# Patient Record
Sex: FEMALE | Race: BLACK OR AFRICAN AMERICAN | NOT HISPANIC OR LATINO | Employment: FULL TIME | URBAN - METROPOLITAN AREA
[De-identification: names, ages, dates, MRNs, and addresses within clinical notes are randomized per-mention and may not be internally consistent; named-entity substitution may affect disease eponyms.]

---

## 2020-04-06 ENCOUNTER — CLINICAL SUPPORT (OUTPATIENT)
Dept: NUTRITION | Facility: CLINIC | Age: 55
End: 2020-04-06
Payer: COMMERCIAL

## 2020-04-06 DIAGNOSIS — Z71.3 DIETARY COUNSELING AND SURVEILLANCE: Primary | ICD-10-CM

## 2020-04-06 PROCEDURE — 97802 MEDICAL NUTRITION INDIV IN: CPT | Mod: 95,,, | Performed by: NUTRITIONIST

## 2020-04-06 PROCEDURE — 97802 PR MED NUTR THER, 1ST, INDIV, EA 15 MIN: ICD-10-PCS | Mod: 95,,, | Performed by: NUTRITIONIST

## 2020-04-06 NOTE — PROGRESS NOTES
"Nutrition Assessment for Initial Medical Nutrition Therapy    Referring professional: self referral for employee wellness    Reason for MNT visit: Pt in for education and nutrition counseling regarding dietary counseling for goal of body fat loss and healthy living  The patient location is: [place], [place]  The chief complaint leading to consultation is: goal of losing weight  Visit type: Virtual visit with synchronous audio and video  Total time spent with patient: 1 hour  Each patient to whom he or she provides medical services by telemedicine is:  (1) informed of the relationship between the physician and patient and the respective role of any other health care provider with respect to management of the patient; and (2) notified that he or she may decline to receive medical services by telemedicine and may withdraw from such care at any time.    Notes: see below under clinical signs and symptoms    ASSESSMENT    Age: 54 y.o.  Wt: 225 lb  Wt Readings from Last 1 Encounters:   No data found for Wt     Ht: 5'7"  Ht Readings from Last 1 Encounters:   No data found for Ht     BMI: 35.2  BMI Readings from Last 1 Encounters:   No data found for BMI       Clinical signs/symptoms: Patient reported slightly low Energy    Medical History: High blood pressure      Medications: Benicat/HTCZ    Supplements: none    Food/medication interactions:   ANTIHYPERTENSIVE: Insure adequate fluid intake/hydration. Decrease sodium, decrease calories may be recommended. Avoid salt subs. Caution with K suppl. Caution with Mg suppl. Caution with IV iron-severe systemic reactions. Avoid natural licorice.    Food allergies  intolerances: NKFA    Labs:  No labs provided to review and none on file   No results found for: HGBA1C  No results found for: CHOL, TRIG, HDL, LDLCALC    Typical day recall: reviewed and noted during telemedicine consult    Beverages: water: 1 gallon per day; coffee: 1 cup most days of the week with a couple " Tablespoons creamer    Dining out: Regular, 2-3 times per week    Alcohol: nonsmoker; patient stated she drinks a glass of wine every now and then     Lifestyle Influences  Support System: Self    Meal preparation/shopping: self    Current Activity Level: currently none. She was going to the gym a few days a week before COVID 19. We have a goal of walking at least 15-20 minutes per day and to gradually increase time of walking    Patient motivation, anticipated barriers, expected compliance: Patient is motivated and has verbalized understanding and intent to comply    DIAGNOSIS   Problem: Obese  Etiology: related to eating too many calories  Signs/Symptoms: AEB BMI of 35.2 and 24 hour recall    INTERVENTION  Nutrition prescription: estimated energy requirements:   1900 calories   grams protein  120 grams carbohydrates  Focus on heart healthy fats  Fluid: 110 oz + sweat loss      Recommendations & Goals:  Patient goals and recommendations are tailored to the specific patient's needs, readiness to change, lifestyle, culture, skills, resources, & abilities. Strategies to help achieve these nutrition-related goals were discussed which can include but are not limited to SMART goal setting & mindful eating.     Aim for a minimum of 7 hours sleep   Exercise 60 minutes most days  Eat breakfast within 1-2 hours of waking up  Try not to skip any meals or snacks, not going more than 3-4 hours without eating.   At each meal and snack, try to include a source of fiber + lean protein + healthy fat.       Written Materials Provided  These resources are intended to assist the patient in making it easier to choose recommended options when eating out & to identify better-for-you brands at the grocery store:     Meal Planning Guide with recommendations discussed along with portion sizes and a customized meal plan    Eat Fit cleve card as a reminder to download the cleve to ones smart phone which provides: current Eat Fit  partners, approved menu options, food labels for carb counting, & recipes    Fast Food Lite Guide   Eat Fit Grocery Product list    RD contact information- for patient to contact regarding any questions, needs, and/or concerns that may arise     MONITORING & EVALUATION    Communicated with healthcare provider    Documented plan for referral to appropriate agency/healthcare provider as needed    Comprehension: fair     Motivation to change: moderate    Follow-up: 1 year or prn    Counseling time: 1 Hour

## 2020-04-16 ENCOUNTER — OFFICE VISIT (OUTPATIENT)
Dept: FAMILY MEDICINE | Facility: CLINIC | Age: 55
End: 2020-04-16
Payer: COMMERCIAL

## 2020-04-16 VITALS — DIASTOLIC BLOOD PRESSURE: 78 MMHG | SYSTOLIC BLOOD PRESSURE: 126 MMHG

## 2020-04-16 DIAGNOSIS — Z11.59 SCREENING FOR VIRAL DISEASE: ICD-10-CM

## 2020-04-16 DIAGNOSIS — E55.9 VITAMIN D DEFICIENCY: ICD-10-CM

## 2020-04-16 DIAGNOSIS — Z11.4 SCREENING FOR HIV (HUMAN IMMUNODEFICIENCY VIRUS): ICD-10-CM

## 2020-04-16 DIAGNOSIS — I10 ESSENTIAL HYPERTENSION: Primary | ICD-10-CM

## 2020-04-16 DIAGNOSIS — N93.9 VAGINAL BLEEDING, ABNORMAL: ICD-10-CM

## 2020-04-16 DIAGNOSIS — Z12.31 SCREENING MAMMOGRAM, ENCOUNTER FOR: ICD-10-CM

## 2020-04-16 PROCEDURE — 3074F PR MOST RECENT SYSTOLIC BLOOD PRESSURE < 130 MM HG: ICD-10-PCS | Mod: CPTII,,, | Performed by: INTERNAL MEDICINE

## 2020-04-16 PROCEDURE — 3074F SYST BP LT 130 MM HG: CPT | Mod: CPTII,,, | Performed by: INTERNAL MEDICINE

## 2020-04-16 PROCEDURE — 99204 PR OFFICE/OUTPT VISIT, NEW, LEVL IV, 45-59 MIN: ICD-10-PCS | Mod: 95,,, | Performed by: INTERNAL MEDICINE

## 2020-04-16 PROCEDURE — 99204 OFFICE O/P NEW MOD 45 MIN: CPT | Mod: 95,,, | Performed by: INTERNAL MEDICINE

## 2020-04-16 PROCEDURE — 3078F DIAST BP <80 MM HG: CPT | Mod: CPTII,,, | Performed by: INTERNAL MEDICINE

## 2020-04-16 PROCEDURE — 3078F PR MOST RECENT DIASTOLIC BLOOD PRESSURE < 80 MM HG: ICD-10-PCS | Mod: CPTII,,, | Performed by: INTERNAL MEDICINE

## 2020-04-16 RX ORDER — OLMESARTAN MEDOXOMIL AND HYDROCHLOROTHIAZIDE 40/25 40; 25 MG/1; MG/1
1 TABLET ORAL DAILY
Qty: 90 TABLET | Refills: 3 | Status: SHIPPED | OUTPATIENT
Start: 2020-04-16 | End: 2021-07-13 | Stop reason: SDUPTHER

## 2020-04-16 RX ORDER — OLMESARTAN MEDOXOMIL AND HYDROCHLOROTHIAZIDE 40/25 40; 25 MG/1; MG/1
1 TABLET ORAL DAILY
COMMUNITY
Start: 2020-02-10 | End: 2020-04-16 | Stop reason: SDUPTHER

## 2020-04-16 NOTE — ASSESSMENT & PLAN NOTE
Has had 2 episodes of VB after sex.  Last GYN visit was 2 years ago.  Had right salpingoophorectomy

## 2020-04-16 NOTE — PROGRESS NOTES
The patient location is:home  The chief complaint leading to consultation is: establish care  Visit type: audiovisual  Total time spent with patient: 15 minutes  Each patient to whom he or she provides medical services by telemedicine is:  (1) informed of the relationship between the physician and patient and the respective role of any other health care provider with respect to management of the patient; and (2) notified that he or she may decline to receive medical services by telemedicine and may withdraw from such care at any time.    [facility] Note    Chief Complaint      Chief Complaint   Patient presents with    Hypertension     History of Present Illness      Alejandrina Garrison is a 54 y.o. female who presents today to establish care for HTN.  Patient comes to appointment via virtual visit.     Problem List Items Addressed This Visit     Essential hypertension - Primary    Current Assessment & Plan     BP controlled on Olmesartan/HCTZ 40/25 mg daily, no SOB/CP.         Relevant Orders    CBC auto differential    Lipid panel    Comprehensive metabolic panel    Vaginal bleeding, abnormal    Current Assessment & Plan     Has had 2 episodes of VB after sex.  Last GYN visit was 2 years ago.  Had right salpingoophorectomy         Relevant Orders    Ambulatory referral/consult to Obstetrics / Gynecology      Other Visit Diagnoses     Vitamin D deficiency        Relevant Orders    Vitamin D    Screening for HIV (human immunodeficiency virus)        Relevant Orders    HIV 1/2 Ag/Ab (4th Gen)    Screening mammogram, encounter for        Relevant Orders    Mammo Digital Screening Bilat w/ Omar    Screening for viral disease        Relevant Orders    Hepatitis C Antibody          Health Maintenance   Topic Date Due    Hepatitis C Screening  1965    Lipid Panel  1965    TETANUS VACCINE  12/19/1983    Pap Smear with HPV Cotest  12/19/1986    Mammogram  12/19/2005     Colonoscopy  2015       Past Medical History:   Diagnosis Date    Hypertension        Past Surgical History:   Procedure Laterality Date     SECTION  1988    x3, 1998, 1991, 2008    DILATION AND CURETTAGE OF UTERUS      2018    SALPINGOOPHORECTOMY  2008    right       family history includes Cancer in her father; Hypertension in her mother and sister; Leukemia in her father; Osteoporosis in her mother.    Social History     Tobacco Use    Smoking status: Never Smoker   Substance Use Topics    Alcohol use: Yes     Alcohol/week: 1.0 standard drinks     Types: 1 Glasses of wine per week    Drug use: Never       Review of Systems   Constitutional: Negative for chills and fever.   HENT: Negative for congestion and sore throat.    Eyes: Negative for blurred vision and discharge.   Respiratory: Negative for cough and shortness of breath.    Cardiovascular: Negative for chest pain and palpitations.   Gastrointestinal: Negative for constipation, diarrhea, nausea and vomiting.   Genitourinary: Negative for dysuria and hematuria.   Musculoskeletal: Negative for falls and myalgias.   Skin: Negative for itching and rash.   Neurological: Negative for dizziness and headaches.        Outpatient Encounter Medications as of 2020   Medication Sig Dispense Refill    olmesartan-hydrochlorothiazide (BENICAR HCT) 40-25 mg per tablet Take 1 tablet by mouth once daily. 90 tablet 3    [DISCONTINUED] olmesartan-hydrochlorothiazide (BENICAR HCT) 40-25 mg per tablet Take 1 tablet by mouth once daily.       No facility-administered encounter medications on file as of 2020.         Review of patient's allergies indicates:  No Known Allergies    Physical Exam      Vital Signs  BP: 126/78] No vital signs taken by me as this is a virtual visit.  Any reported are patient's home measurements.      Physical Exam   Constitutional: She is oriented to person, place, and time. She appears well-developed and well-nourished.  No distress.   HENT:   Head: Normocephalic and atraumatic.   Neck: Normal range of motion.   Pulmonary/Chest: Effort normal. No respiratory distress.   Neurological: She is alert and oriented to person, place, and time. Coordination normal.   Skin: No rash noted.   Psychiatric: She has a normal mood and affect. Her behavior is normal. Judgment and thought content normal.        Laboratory:  CBC:  No results for input(s): WBC, RBC, HGB, HCT, PLT, MCV, MCH, MCHC in the last 2160 hours.  CMP:  No results for input(s): GLU, CALCIUM, ALBUMIN, PROT, NA, K, CO2, CL, BUN, ALKPHOS, ALT, AST, BILITOT in the last 2160 hours.    Invalid input(s): CREATININ  URINALYSIS:  No results for input(s): COLORU, CLARITYU, SPECGRAV, PHUR, PROTEINUA, GLUCOSEU, BILIRUBINCON, BLOODU, WBCU, RBCU, BACTERIA, MUCUS, NITRITE, LEUKOCYTESUR, UROBILINOGEN, HYALINECASTS in the last 2160 hours.   LIPIDS:  No results for input(s): TSH, HDL, CHOL, TRIG, LDLCALC, CHOLHDL, NONHDLCHOL, TOTALCHOLEST in the last 2160 hours.  TSH:  No results for input(s): TSH in the last 2160 hours.  A1C:  No results for input(s): HGBA1C in the last 2160 hours.    Radiology:  No imaging on file    Assessment/Plan     Alejandrina Garrison is a 54 y.o.female with:    1. Essential hypertension  - CBC auto differential; Future  - Lipid panel; Future  - Comprehensive metabolic panel; Future    2. Vaginal bleeding, abnormal  - Ambulatory referral/consult to Obstetrics / Gynecology; Future    3. Vitamin D deficiency  - Vitamin D; Future    4. Screening for HIV (human immunodeficiency virus)  - HIV 1/2 Ag/Ab (4th Gen); Future    5. Screening mammogram, encounter for  - Mammo Digital Screening Bilat w/ Omar; Future    6. Screening for viral disease  - Hepatitis C Antibody; Future    -Labs sent downstairs, will order MMG  -Patient will bring me Dr. Garcia's records ([address] [place] [address], [address], [facility] [address], [phone])  -Refer for screening colonoscopy once COVID has  resolved (Marv)  -Continue current medications and maintain follow up with specialists.  Return to clinic in 6 months.      [facility] [place]

## 2020-04-20 RX ORDER — ERGOCALCIFEROL 1.25 MG/1
50000 CAPSULE ORAL
Qty: 12 CAPSULE | Refills: 0 | Status: SHIPPED | OUTPATIENT
Start: 2020-04-20 | End: 2021-07-22

## 2020-04-21 DIAGNOSIS — Z01.84 ANTIBODY RESPONSE EXAMINATION: ICD-10-CM

## 2020-04-30 ENCOUNTER — PATIENT MESSAGE (OUTPATIENT)
Dept: NUTRITION | Facility: CLINIC | Age: 55
End: 2020-04-30

## 2020-05-12 ENCOUNTER — TELEPHONE (OUTPATIENT)
Dept: OBSTETRICS AND GYNECOLOGY | Facility: CLINIC | Age: 55
End: 2020-05-12

## 2020-05-12 NOTE — TELEPHONE ENCOUNTER
Received IM that pt went to the [facility] location for appt today at 1pm. Pt states she prefers [facility] and thought that's where the appt was. Pt rescheduled for 5/14 at 8:30am. Patient verbalized understanding.

## 2020-05-13 ENCOUNTER — TELEPHONE (OUTPATIENT)
Dept: OBSTETRICS AND GYNECOLOGY | Facility: CLINIC | Age: 55
End: 2020-05-13

## 2020-05-13 DIAGNOSIS — N95.0 POSTMENOPAUSAL BLEEDING: Primary | ICD-10-CM

## 2020-05-13 NOTE — TELEPHONE ENCOUNTER
----- Message from Bebe Saldana sent at 5/13/2020  9:52 AM CDT -----  Contact: [phone]/ self   Patient requesting to speak with you regarding an ultrasound. She says she thought she was scheduled for one on today but isn't. Please advise.

## 2020-05-13 NOTE — TELEPHONE ENCOUNTER
----- Message from Nina Santiago sent at 5/13/2020  8:48 AM CDT -----  Contact: Alejandrina Amaya said she is supposed to have an ultrasound done today, but she doesn't have any orders in yet.

## 2020-05-14 ENCOUNTER — OFFICE VISIT (OUTPATIENT)
Dept: OBSTETRICS AND GYNECOLOGY | Facility: CLINIC | Age: 55
End: 2020-05-14
Payer: COMMERCIAL

## 2020-05-14 VITALS
SYSTOLIC BLOOD PRESSURE: 116 MMHG | DIASTOLIC BLOOD PRESSURE: 82 MMHG | BODY MASS INDEX: 37.89 KG/M2 | WEIGHT: 241.38 LBS | HEIGHT: 67 IN

## 2020-05-14 DIAGNOSIS — N92.6 IRREGULAR BLEEDING: Primary | ICD-10-CM

## 2020-05-14 PROCEDURE — 88305 TISSUE EXAM BY PATHOLOGIST: CPT | Performed by: PATHOLOGY

## 2020-05-14 PROCEDURE — 99499 UNLISTED E&M SERVICE: CPT | Mod: S$GLB,,, | Performed by: OBSTETRICS & GYNECOLOGY

## 2020-05-14 PROCEDURE — 88305 TISSUE EXAM BY PATHOLOGIST: CPT | Mod: 26,,, | Performed by: PATHOLOGY

## 2020-05-14 PROCEDURE — 58100 PR BIOPSY OF UTERUS LINING: ICD-10-PCS | Mod: 59,S$GLB,, | Performed by: OBSTETRICS & GYNECOLOGY

## 2020-05-14 PROCEDURE — 99499 NO LOS: ICD-10-PCS | Mod: S$GLB,,, | Performed by: OBSTETRICS & GYNECOLOGY

## 2020-05-14 PROCEDURE — 99999 PR PBB SHADOW E&M-EST. PATIENT-LVL III: CPT | Mod: PBBFAC,,, | Performed by: OBSTETRICS & GYNECOLOGY

## 2020-05-14 PROCEDURE — 58555 PR HYSTEROSCOPY,DX,SEP PROC: ICD-10-PCS | Mod: S$GLB,,, | Performed by: OBSTETRICS & GYNECOLOGY

## 2020-05-14 PROCEDURE — 88305 TISSUE EXAM BY PATHOLOGIST: ICD-10-PCS | Mod: 26,,, | Performed by: PATHOLOGY

## 2020-05-14 PROCEDURE — 58555 HYSTEROSCOPY DX SEP PROC: CPT | Mod: S$GLB,,, | Performed by: OBSTETRICS & GYNECOLOGY

## 2020-05-14 PROCEDURE — 99999 PR PBB SHADOW E&M-EST. PATIENT-LVL III: ICD-10-PCS | Mod: PBBFAC,,, | Performed by: OBSTETRICS & GYNECOLOGY

## 2020-05-14 PROCEDURE — 58100 BIOPSY OF UTERUS LINING: CPT | Mod: 59,S$GLB,, | Performed by: OBSTETRICS & GYNECOLOGY

## 2020-05-14 NOTE — PROGRESS NOTES
GYNECOLOGY OFFICE NOTE    Reason for visit: irregular bleeding    HPI: Pt is a 54 y.o.  female  who presents for evaluation of irregular cycles. Cycle: menarche- 13, Interval- Q month up until . States for last 5 years has had 1 cycle a year in Dec. Duration can vary from 3-5 days to 10-12 days, Flow- heavy (changing every 2-3 hrs) this time used pad and tampon, denies dysmenorrhea. She is sexually active. She does not desire STI screening. She denies vaginal discharge.  Last pap: 2018, denies hx of abnormal. Can get records from previous gyn. Had work up that included EMBX and D&C hysteroscopy in 2018. We discussed EMBX, in office hysteroscopy + embx, or D&C hysteroscopy. After discussing risk, benefits of each decision made to proceed with hysteroscopy with embx today. Consents signed.     U/S: 20:  The uterus measures 8.0 cm in length.   The endometrial stripe in this premenopausal patient measures 4.3 mm which is within normal limits.  The right ovary measures 1.9 x 1.2 x 1.6 cm.  9 mm follicle noted within the right ovary.  The left ovary is apparently surgically absent.    Arterial flow demonstrated to the right ovary.  No free fluid identified.    Past Medical History:   Diagnosis Date    Hypertension        Past Surgical History:   Procedure Laterality Date     SECTION  1988    x3, , ,     DILATION AND CURETTAGE OF UTERUS      2018    right tubal       SALPINGOOPHORECTOMY  2008    left       Family History   Problem Relation Age of Onset    Hypertension Mother     Osteoporosis Mother     Cancer Father     Leukemia Father     Hypertension Sister     Colon cancer Maternal Grandmother     Breast cancer Neg Hx     Ovarian cancer Neg Hx        Social History     Tobacco Use    Smoking status: Never Smoker    Smokeless tobacco: Never Used   Substance Use Topics    Alcohol use: Yes     Alcohol/week: 1.0 standard drinks     Types: 1 Glasses of wine per week     "Drug use: Never       OB History    Para Term  AB Living   4 4       4   SAB TAB Ectopic Multiple Live Births           4      # Outcome Date GA Lbr Kwasi/2nd Weight Sex Delivery Anes PTL Lv   4 Para      CS-Unspec   FLASH   3 Para         FLASH   2 Para      CS-Unspec   FLASH   1 Para      CS-Unspec   FLASH       Current Outpatient Medications   Medication Sig    ergocalciferol (ERGOCALCIFEROL) 50,000 unit Cap Take 1 capsule (50,000 Units total) by mouth every 7 days.    olmesartan-hydrochlorothiazide (BENICAR HCT) 40-25 mg per tablet Take 1 tablet by mouth once daily.     No current facility-administered medications for this visit.        Allergies: Morphine and Compazine [prochlorperazine edisylate]     /82   Ht 5' 7" (1.702 m)   Wt 109.5 kg (241 lb 6.5 oz)   LMP  (LMP Unknown)   BMI 37.81 kg/m²     ROS:  GENERAL: Denies fever or chills.   SKIN: Denies rash or lesions.   HEAD: Denies head injury or headache.   CHEST: Denies chest pain or shortness of breath.   CARDIOVASCULAR: Denies palpitations or chest pain.   ABDOMEN: No constipation, diarrhea, nausea, vomiting or rectal bleeding.   URINARY: No dysuria, hematuria, or burning on urination.  REPRODUCTIVE: See HPI.   BREASTS: see HPI  NEUROLOGIC: Denies syncope or weakness.     Physical Exam:  GENERAL: alert, appears stated age and cooperative  NEUROLOGIC: orientated to person, place and time, normal mood and affect   CHEST: Normal respiratory effort  NECK: normal appearance  SKIN: no acne, hirsutism  BREAST EXAM: not examined  ABDOMEN: abdomen is soft without significant tenderness, masses  EXTERNAL GENITALIA:  normal general appearance  URETHRA: normal urethra, normal urethral meatus  VAGINA:  normal without tenderness, induration or masses  CERVIX:  Normal    PROCEDURE:  OPERATIVE REPORT FOR OFFICE HYSTEROSCOPY WITH ENDOMETRIAL BIOPSY                                                                                                             "                  Date of Procedure: 5/14/20                                                                               SURGEON:  DILIA Joseph M.D.                                                                                                                    ASSISTANT:  None                                                                                                                      PREOPERATIVE DIAGNOSIS:  1. Abnormal uterine bleeding                                                                                        POSTOPERATIVE DIAGNOSIS:    1. Abnormal uterine bleeding                                                                                 PROCEDURE:  Diagnostic Hysteroscopy with Kristina Operascope, Endometrial Biopsy                                                                               ANESTHESIA:  None     BLOOD LOSS:  None                                                                                                                                     URINE OUTPUT:  prior to procedure.                                                                                       IV FLUIDS:  None     HYSTEROSCOPIC FLUID IN: 100mL     HYSTEROSCOPIC FLUID OUT: 100mL     HYSTEROSCOPIC FLUID DEFICIT:  0mL     COMPLICATIONS:  None     SPECIMENS:  1) Endometrial biopsy      FINDINGS:  1) Normal tubal ostia bilaterally  2. normal uterine cavity  3. normal cervical canal     STATEMENT OF MEDICAL NECESSITY:  Pt is a 54 year old female, with recurrent postmenopausal bleeding.  A transvaginal ultrasound was performed prior which showed normal uterine cavity.   Risks/benefits/alternatives/indications were discussed with the patient and she wished to proceed with the above stated procedure. Written consents obtained.                                                                                                                                 PROCEDURE IN DETAIL:  After appropriate consents had been  obtained and time out performed, the patient was taken to the procedure room. Patient was then placed in the lithotomy position, prepped in the appropriate sterile fashion for a vaginal procedure.  A speculum was put in place.  Betadine used to cleanse the cervix. A single tooth tenaculum was placed on the anterior lip of the cervix.  The uterine cavity sounded to approximately 9 cm.  No need for cervical dilation. The Kristina operoscope was inserted into the endometrial cavity under direct visualization. without difficulty. Both tubal ostia were clearly identified and the findings were noted above. The kristina device was removed and a endometrial biopsy was performed and tissue sent to Pathology. The tenaculum was removed and hemostasis noted.The speculum was then removed. All counts were correct x 2 at the end of the procedure.  The patient was out of lithotomy position. Antibiotics were not indicated for this case.       Diagnosis:  1. Irregular bleeding        Plan:   1. Normal ultrasound and now s/p in office hysteroscopy with embx in office. F/u pending results. SRAVANTHI to review records from previous work-up       Orders Placed This Encounter    Specimen to Pathology, Ob/Gyn       Patient was counseled today on the new ACS guidelines for cervical cytology screening as well as the current recommendations for breast cancer screening. She was counseled to follow up with her PCP for other routine health maintenance.     Follow up for pending results.      Cinthia Joseph MD  OB/GYN

## 2020-05-14 NOTE — PATIENT INSTRUCTIONS
Endometrial Biopsy    Endometrial biopsy is a procedure used to study the endometrium (lining of the uterus). It is usually done in your health care providers office. During the biopsy, small tissue samples are taken from inside the uterus. These are then sent to a lab for study. If any problems are found, you and your health care provider will discuss treatment options. The biopsy usually takes only a few minutes, and you can often go back to your normal routine as soon as the procedure is over.  Reasons for the procedure  Endometrial biopsy may help pinpoint the cause of certain problems. These include:  · Bleeding after menopause  · Heavy or irregular menstrual periods  · Bleeding associated with hormone therapy  · Prolonged bleeding  · Abnormal Pap test results  · Having certain types of cancer  · Trouble getting pregnant (fertility problems)  What are the risks?  Problems with endometrial biopsy are rare, but can include:  · Bleeding  · Infection  · Damage to the uterine wall (very rare)  Getting ready for the procedure  Your health care provider will ask about your health and any medicines you take, like blood thinners. Before your biopsy, you may have tests to make sure youre not pregnant or have an infection. You may also be asked to sign a consent form. A day or 2 before the procedure:   · Avoid using creams or other vaginal medicines.  · Avoid douching.  · Ask your health care provider if you should take pain medicines shortly before the test.  During the biopsy  During the biopsy, you will likely experience the following:  · You will be asked to lie on an exam table with your knees bent, just as you do for a Pap test.  · You may have a brief pelvic exam. An instrument called a speculum is then inserted into the vagina to hold it open.  · An antiseptic solution may be applied to the cervix. The cervix may also be numbed with an anesthetic or dilated to widen the opening.  · A small tube is passed  through the cervix into the uterus.  · It is normal to feel some cramping when the tube is inserted. But tell your health care provider if you have severe cramping or are very uncomfortable.  · Using mild suction, samples are taken from the uterine lining. You may feel pinching or additional cramping when this is done.  · The tube and speculum are then removed and the samples are sent to a lab for study.  After the procedure  After the procedure, you may experience the following:  · If you feel lightheaded or dizzy, you can rest on the table until youre ready to get dressed.  · For a few hours, you may feel some mild cramping. This can usually be relieved with over-the-counter pain medicines.  · You may have some bleeding for a few days. Use pads instead of tampons.  · Dont douche or use any vaginal medicines unless your health care provider says its OK.  · Ask your health care provider when its OK to have sex again.  Follow-up care  It will take about a week for the biopsy results to come back from the lab. Then you and your health care provider can discuss the results. These may show that no treatment is required. Or, you may be scheduled for a follow-up appointment and more tests. If your biopsy was done for fertility problems, be sure to record the day when your next period begins.     Call your health care provider   Contact your health care provider if you have any of the following:  · Heavy bleeding (more than a pad an hour for 2 hours).  · Severe cramping or increasing pain.  · Fever over 100.4°F (38.0°C)  · Foul-smelling or unusual vaginal discharge.   Date Last Reviewed: 5/10/2015  © 8307-0636 [employer]. [address] [place] [address], [place] [address]. All rights reserved. This information is not intended as a substitute for professional medical care. Always follow your healthcare professional's instructions.

## 2020-05-14 NOTE — LETTER
May 14, 2020      Chapis Martinez MD  [id number] [place] [address]  [address]  [place] [address]           [place] [facility]  [address] [place] [address], [facility] [address]  [facility] [address]  Phone: [phone]          Patient: Alejandrina Garrison   MR Number: [id number]   YOB: 1965   Date of Visit: 5/14/2020       Dear Dr. Chapis Martinze:    Thank you for referring Alejandrina Garrison to me for evaluation. Attached you will find relevant portions of my assessment and plan of care.    If you have questions, please do not hesitate to call me. I look forward to following Alejandrina Garrison along with you.    Sincerely,    Cinthia Joseph MD    Enclosure  CC:  No Recipients    If you would like to receive this communication electronically, please contact [email or web address] or [phone] to request more information on [employer] access.    For providers and/or their staff who would like to refer a patient to [facility], please contact us through our one-stop-shop provider referral line, [facility], at [phone].    If you feel you have received this communication in error or would no longer like to receive these types of communications, please e-mail [email or web address]

## 2020-05-15 ENCOUNTER — PATIENT MESSAGE (OUTPATIENT)
Dept: FAMILY MEDICINE | Facility: CLINIC | Age: 55
End: 2020-05-15

## 2020-05-15 ENCOUNTER — PATIENT MESSAGE (OUTPATIENT)
Dept: ADMINISTRATIVE | Facility: OTHER | Age: 55
End: 2020-05-15

## 2020-05-18 LAB
FINAL PATHOLOGIC DIAGNOSIS: NORMAL
GROSS: NORMAL

## 2020-05-21 DIAGNOSIS — Z01.84 ANTIBODY RESPONSE EXAMINATION: ICD-10-CM

## 2020-06-17 ENCOUNTER — PATIENT MESSAGE (OUTPATIENT)
Dept: FAMILY MEDICINE | Facility: CLINIC | Age: 55
End: 2020-06-17

## 2020-06-20 DIAGNOSIS — Z01.84 ANTIBODY RESPONSE EXAMINATION: ICD-10-CM

## 2020-07-20 DIAGNOSIS — Z01.84 ANTIBODY RESPONSE EXAMINATION: ICD-10-CM

## 2020-08-19 DIAGNOSIS — Z01.84 ANTIBODY RESPONSE EXAMINATION: ICD-10-CM

## 2020-09-18 DIAGNOSIS — Z01.84 ANTIBODY RESPONSE EXAMINATION: ICD-10-CM

## 2020-10-05 ENCOUNTER — PATIENT MESSAGE (OUTPATIENT)
Dept: INTERNAL MEDICINE | Facility: CLINIC | Age: 55
End: 2020-10-05

## 2020-10-18 DIAGNOSIS — Z01.84 ANTIBODY RESPONSE EXAMINATION: ICD-10-CM

## 2020-11-03 ENCOUNTER — PATIENT OUTREACH (OUTPATIENT)
Dept: OTHER | Facility: OTHER | Age: 55
End: 2020-11-03

## 2020-11-03 NOTE — LETTER
November 6, 2020     Alejandrina Garrison  [address] [facility] [address]       Dear Alejandrina,    Welcome to [facility]! Our goal is to make care effective, proactive and convenient by using data you send us from home to better treat your chronic conditions.                My name is Rita Mike Rosales, and I am your dedicated Digital Medicine clinician. As an expert in medication management, I will help ensure that the medications you are taking continue to provide the intended benefits and help you reach your goals. You can reach me directly at [phone] or by sending me a message directly through your [facility] account.      I am Stefany Olson and I will be your health . My job is to help you identify lifestyle changes to improve your disease control. We will talk about nutrition, exercise, and other ways you may be able to adjust your current habits to better your health. Additionally, we will help ensure you are completing the tests and screenings that are necessary to help manage your conditions. You can reach me directly at [phone] or by sending me a message directly through your [facility] account.    Most importantly, YOU are at the center of this team. Together, we will work to improve your overall health and encourage you to meet your goals for a healthier lifestyle.     What we expect from YOU:  · Please take frequent home blood pressure measurements. We ask that you take at least 1 blood pressure reading per week, but more information will better help us get you know you. Be sure you rest for a few minutes before taking the reading in a quiet, comfortable place.     Be available to receive phone calls or [employer] messages, when appropriate, from your care team. Please let us know if there are any specific days or times that work best for us to reach you via phone.     Complete routine tests and screenings. Dont worry, we will help keep you on track!           What you  should expect from your Digital Medicine Care Team:   We will work with you to create a personalized plan of care and provide you with encouragement and education, including regarding lifestyle changes, that could help you manage your disease states.     We will adjust your current medications, if needed, and continue to monitor your long-term progress.     We will provide you and your physician with monthly progress reports after you have been in the program for more than 30 days.     We will send you reminders through [employer] and text messages to help ensure you do not miss any testing deadlines to help manage your disease states.    You will be able to reach us by phone or through your [employer] account by clicking our names under Care Team on the right side of the home screen.    We look forward to working with you to help manage your health,    Sincerely,    Your Digital Medicine Team    Please visit our websites to learn more:   · Hypertension: [email or web address]      Remember, we are not available for emergencies. If you have an emergency, please contact your doctors office directly or call [facility] ([phone] or [phone]) or 911.

## 2020-11-06 NOTE — PROGRESS NOTES
Digital Medicine: Health  Introduction    Introduced Alejandrina Garrison to Digital Medicine. Discussed health  role and recommended lifestyle modifications.    The history is provided by the patient.               HYPERTENSION  Explained hypertension digital medicine goals including BP goal less than or equal to 130/80mmHg, improved convenience of BP management and reduced risk of heart attack, kidney failure, stroke, eye disease, dementia, and death.      Explained non-pharmacologic therapies like low salt diet and physical activity can reduce blood pressure. .      Explained that we expect patient to submit several blood pressure readings per week at random times of the day, but at least 30 minutes after taking blood pressure medications. Instructed patient not to allow anyone else to use their blood pressure monitor and phone as data submitted is directly entered into medical record. Reviewed and confirmed appropriate blood pressure monitoring technique.         Patient's BP goal is less than or equal to 130/80.Patient's BP average is 142/84 mmHg, which is above goal, per 2017 ACC/AHA Hypertension Guidelines.    Explained to the patient that the Digital Medicine team is not available for emergencies. Advised patient call [facility] On [facility] ([phone] or [phone]) or 911 if needed.                 Diet-Assessed      Additional diet details: Encouraged to decrease sodium intake to <1500 mg per day. Recommend increasing more whole foods and less processed foods.       Physical Activity-Assessed      Additional physical activity details: Encouraged to increase exercise to at least 150 minutes per week.        Medication Adherence-Medication Adherence not addressed.      Substance, Sleep, Stress-Not assessed      Provided patient education.  Reviewed Device Techniques.     Addressed patient questions and patient has my contact information if needed prior to next outreach. Patient verbalizes  understanding.      Explained the importance of self-monitoring and medication adherence. Encouraged the patient to communicate with their health  for lifestyle modifications to help improve or maintain a healthy lifestyle.        Explained to the patient that the Digital Medicine team is not available for emergencies. Advised patient call [facility] ([phone] or [phone]) or 911 if needed.            There are no preventive care reminders to display for this patient.      Last 5 Patient Entered Readings                                      Current 30 Day Average: 142/84     Recent Readings 10/26/2020 10/25/2020 10/24/2020 10/23/2020 10/23/2020    SBP (mmHg) 138 138 146 155 157    DBP (mmHg) 80 80 87 96 87    Pulse 72 78 83 68 66

## 2020-11-17 DIAGNOSIS — Z01.84 ANTIBODY RESPONSE EXAMINATION: ICD-10-CM

## 2020-11-20 ENCOUNTER — PATIENT OUTREACH (OUTPATIENT)
Dept: OTHER | Facility: OTHER | Age: 55
End: 2020-11-20

## 2020-11-23 ENCOUNTER — PATIENT MESSAGE (OUTPATIENT)
Dept: ADMINISTRATIVE | Facility: OTHER | Age: 55
End: 2020-11-23

## 2020-12-04 ENCOUNTER — PATIENT OUTREACH (OUTPATIENT)
Dept: OTHER | Facility: OTHER | Age: 55
End: 2020-12-04

## 2020-12-15 ENCOUNTER — PATIENT MESSAGE (OUTPATIENT)
Dept: FAMILY MEDICINE | Facility: CLINIC | Age: 55
End: 2020-12-15

## 2020-12-16 ENCOUNTER — IMMUNIZATION (OUTPATIENT)
Dept: FAMILY MEDICINE | Facility: CLINIC | Age: 55
End: 2020-12-16
Payer: COMMERCIAL

## 2020-12-16 DIAGNOSIS — Z23 NEED FOR VACCINATION: ICD-10-CM

## 2020-12-16 PROCEDURE — 0001A COVID-19, MRNA, LNP-S, PF, 30 MCG/0.3 ML DOSE VACCINE: CPT | Mod: CV19,,, | Performed by: FAMILY MEDICINE

## 2020-12-16 PROCEDURE — 91300 COVID-19, MRNA, LNP-S, PF, 30 MCG/0.3 ML DOSE VACCINE: ICD-10-PCS | Mod: ,,, | Performed by: FAMILY MEDICINE

## 2020-12-16 PROCEDURE — 0001A COVID-19, MRNA, LNP-S, PF, 30 MCG/0.3 ML DOSE VACCINE: ICD-10-PCS | Mod: CV19,,, | Performed by: FAMILY MEDICINE

## 2020-12-16 PROCEDURE — 91300 COVID-19, MRNA, LNP-S, PF, 30 MCG/0.3 ML DOSE VACCINE: CPT | Mod: ,,, | Performed by: FAMILY MEDICINE

## 2021-01-06 ENCOUNTER — IMMUNIZATION (OUTPATIENT)
Dept: FAMILY MEDICINE | Facility: CLINIC | Age: 56
End: 2021-01-06
Payer: COMMERCIAL

## 2021-01-06 ENCOUNTER — CLINICAL SUPPORT (OUTPATIENT)
Dept: FAMILY MEDICINE | Facility: CLINIC | Age: 56
End: 2021-01-06
Payer: COMMERCIAL

## 2021-01-06 DIAGNOSIS — R07.9 CHEST PAIN, UNSPECIFIED TYPE: Primary | ICD-10-CM

## 2021-01-06 DIAGNOSIS — Z23 NEED FOR VACCINATION: ICD-10-CM

## 2021-01-06 PROCEDURE — 0002A COVID-19, MRNA, LNP-S, PF, 30 MCG/0.3 ML DOSE VACCINE: CPT | Mod: PBBFAC | Performed by: FAMILY MEDICINE

## 2021-01-06 PROCEDURE — 93010 EKG 12-LEAD: ICD-10-PCS | Mod: S$GLB,,, | Performed by: INTERNAL MEDICINE

## 2021-01-06 PROCEDURE — 93005 ELECTROCARDIOGRAM TRACING: CPT | Mod: S$GLB,,, | Performed by: INTERNAL MEDICINE

## 2021-01-06 PROCEDURE — 91300 COVID-19, MRNA, LNP-S, PF, 30 MCG/0.3 ML DOSE VACCINE: CPT | Mod: PBBFAC | Performed by: FAMILY MEDICINE

## 2021-01-06 PROCEDURE — 93005 EKG 12-LEAD: ICD-10-PCS | Mod: S$GLB,,, | Performed by: INTERNAL MEDICINE

## 2021-01-06 PROCEDURE — 93010 ELECTROCARDIOGRAM REPORT: CPT | Mod: S$GLB,,, | Performed by: INTERNAL MEDICINE

## 2021-01-06 RX ORDER — HYDROXYZINE HYDROCHLORIDE 25 MG/1
25 TABLET, FILM COATED ORAL 3 TIMES DAILY PRN
Qty: 60 TABLET | Refills: 0 | OUTPATIENT
Start: 2021-01-06 | End: 2022-01-11

## 2021-01-21 ENCOUNTER — PATIENT MESSAGE (OUTPATIENT)
Dept: FAMILY MEDICINE | Facility: CLINIC | Age: 56
End: 2021-01-21

## 2021-01-21 DIAGNOSIS — Z12.11 SCREENING FOR MALIGNANT NEOPLASM OF COLON: Primary | ICD-10-CM

## 2021-01-29 ENCOUNTER — PATIENT MESSAGE (OUTPATIENT)
Dept: ADMINISTRATIVE | Facility: HOSPITAL | Age: 56
End: 2021-01-29

## 2021-04-05 ENCOUNTER — PATIENT MESSAGE (OUTPATIENT)
Dept: ADMINISTRATIVE | Facility: HOSPITAL | Age: 56
End: 2021-04-05

## 2021-05-19 ENCOUNTER — OFFICE VISIT (OUTPATIENT)
Dept: OPTOMETRY | Facility: CLINIC | Age: 56
End: 2021-05-19
Payer: COMMERCIAL

## 2021-05-19 DIAGNOSIS — H52.03 HYPEROPIA WITH ASTIGMATISM AND PRESBYOPIA, BILATERAL: Primary | ICD-10-CM

## 2021-05-19 DIAGNOSIS — H52.4 HYPEROPIA WITH ASTIGMATISM AND PRESBYOPIA, BILATERAL: Primary | ICD-10-CM

## 2021-05-19 DIAGNOSIS — H25.13 NUCLEAR SCLEROSIS OF BOTH EYES: ICD-10-CM

## 2021-05-19 DIAGNOSIS — H52.203 HYPEROPIA WITH ASTIGMATISM AND PRESBYOPIA, BILATERAL: Primary | ICD-10-CM

## 2021-05-19 PROCEDURE — 99999 PR PBB SHADOW E&M-EST. PATIENT-LVL II: CPT | Mod: PBBFAC,,, | Performed by: OPTOMETRIST

## 2021-05-19 PROCEDURE — 92004 PR EYE EXAM, NEW PATIENT,COMPREHESV: ICD-10-PCS | Mod: S$GLB,,, | Performed by: OPTOMETRIST

## 2021-05-19 PROCEDURE — 99999 PR PBB SHADOW E&M-EST. PATIENT-LVL II: ICD-10-PCS | Mod: PBBFAC,,, | Performed by: OPTOMETRIST

## 2021-05-19 PROCEDURE — 92015 PR REFRACTION: ICD-10-PCS | Mod: S$GLB,,, | Performed by: OPTOMETRIST

## 2021-05-19 PROCEDURE — 92004 COMPRE OPH EXAM NEW PT 1/>: CPT | Mod: S$GLB,,, | Performed by: OPTOMETRIST

## 2021-05-19 PROCEDURE — 92015 DETERMINE REFRACTIVE STATE: CPT | Mod: S$GLB,,, | Performed by: OPTOMETRIST

## 2021-05-26 ENCOUNTER — OFFICE VISIT (OUTPATIENT)
Dept: OPTOMETRY | Facility: CLINIC | Age: 56
End: 2021-05-26
Payer: COMMERCIAL

## 2021-05-26 DIAGNOSIS — H52.203 HYPEROPIA WITH ASTIGMATISM AND PRESBYOPIA, BILATERAL: Primary | ICD-10-CM

## 2021-05-26 DIAGNOSIS — H52.03 HYPEROPIA WITH ASTIGMATISM AND PRESBYOPIA, BILATERAL: Primary | ICD-10-CM

## 2021-05-26 DIAGNOSIS — H52.4 HYPEROPIA WITH ASTIGMATISM AND PRESBYOPIA, BILATERAL: Primary | ICD-10-CM

## 2021-05-26 PROCEDURE — 92310 CONTACT LENS FITTING OU: CPT | Mod: S$GLB,,, | Performed by: OPTOMETRIST

## 2021-05-26 PROCEDURE — 99499 UNLISTED E&M SERVICE: CPT | Mod: S$GLB,,, | Performed by: OPTOMETRIST

## 2021-05-26 PROCEDURE — 92310 PR CONTACT LENS FITTING (NO CHANGE): ICD-10-PCS | Mod: S$GLB,,, | Performed by: OPTOMETRIST

## 2021-05-26 PROCEDURE — 99499 NO LOS: ICD-10-PCS | Mod: S$GLB,,, | Performed by: OPTOMETRIST

## 2021-06-23 DIAGNOSIS — Z12.31 OTHER SCREENING MAMMOGRAM: ICD-10-CM

## 2021-06-25 ENCOUNTER — TELEPHONE (OUTPATIENT)
Dept: GASTROENTEROLOGY | Facility: CLINIC | Age: 56
End: 2021-06-25

## 2021-07-07 ENCOUNTER — PATIENT MESSAGE (OUTPATIENT)
Dept: ADMINISTRATIVE | Facility: HOSPITAL | Age: 56
End: 2021-07-07

## 2021-07-14 RX ORDER — OLMESARTAN MEDOXOMIL AND HYDROCHLOROTHIAZIDE 40/25 40; 25 MG/1; MG/1
1 TABLET ORAL DAILY
Qty: 90 TABLET | Refills: 3 | Status: SHIPPED | OUTPATIENT
Start: 2021-07-14 | End: 2022-09-12 | Stop reason: SDUPTHER

## 2021-07-15 ENCOUNTER — PATIENT OUTREACH (OUTPATIENT)
Dept: ADMINISTRATIVE | Facility: HOSPITAL | Age: 56
End: 2021-07-15

## 2021-07-20 ENCOUNTER — PATIENT OUTREACH (OUTPATIENT)
Dept: ADMINISTRATIVE | Facility: OTHER | Age: 56
End: 2021-07-20

## 2021-07-22 ENCOUNTER — OFFICE VISIT (OUTPATIENT)
Dept: OBSTETRICS AND GYNECOLOGY | Facility: CLINIC | Age: 56
End: 2021-07-22
Payer: COMMERCIAL

## 2021-07-22 VITALS
DIASTOLIC BLOOD PRESSURE: 72 MMHG | WEIGHT: 242.31 LBS | BODY MASS INDEX: 37.95 KG/M2 | SYSTOLIC BLOOD PRESSURE: 138 MMHG

## 2021-07-22 DIAGNOSIS — Z01.419 WELL WOMAN EXAM WITH ROUTINE GYNECOLOGICAL EXAM: Primary | ICD-10-CM

## 2021-07-22 DIAGNOSIS — Z12.4 SCREENING FOR CERVICAL CANCER: ICD-10-CM

## 2021-07-22 DIAGNOSIS — Z12.31 ENCOUNTER FOR SCREENING MAMMOGRAM FOR BREAST CANCER: ICD-10-CM

## 2021-07-22 PROCEDURE — 1126F PR PAIN SEVERITY QUANTIFIED, NO PAIN PRESENT: ICD-10-PCS | Mod: CPTII,S$GLB,, | Performed by: OBSTETRICS & GYNECOLOGY

## 2021-07-22 PROCEDURE — 99999 PR PBB SHADOW E&M-EST. PATIENT-LVL III: CPT | Mod: PBBFAC,,, | Performed by: OBSTETRICS & GYNECOLOGY

## 2021-07-22 PROCEDURE — 3075F PR MOST RECENT SYSTOLIC BLOOD PRESS GE 130-139MM HG: ICD-10-PCS | Mod: CPTII,S$GLB,, | Performed by: OBSTETRICS & GYNECOLOGY

## 2021-07-22 PROCEDURE — 3078F PR MOST RECENT DIASTOLIC BLOOD PRESSURE < 80 MM HG: ICD-10-PCS | Mod: CPTII,S$GLB,, | Performed by: OBSTETRICS & GYNECOLOGY

## 2021-07-22 PROCEDURE — 3075F SYST BP GE 130 - 139MM HG: CPT | Mod: CPTII,S$GLB,, | Performed by: OBSTETRICS & GYNECOLOGY

## 2021-07-22 PROCEDURE — 99396 PR PREVENTIVE VISIT,EST,40-64: ICD-10-PCS | Mod: S$GLB,,, | Performed by: OBSTETRICS & GYNECOLOGY

## 2021-07-22 PROCEDURE — 1126F AMNT PAIN NOTED NONE PRSNT: CPT | Mod: CPTII,S$GLB,, | Performed by: OBSTETRICS & GYNECOLOGY

## 2021-07-22 PROCEDURE — 99396 PREV VISIT EST AGE 40-64: CPT | Mod: S$GLB,,, | Performed by: OBSTETRICS & GYNECOLOGY

## 2021-07-22 PROCEDURE — 3078F DIAST BP <80 MM HG: CPT | Mod: CPTII,S$GLB,, | Performed by: OBSTETRICS & GYNECOLOGY

## 2021-07-22 PROCEDURE — 3008F PR BODY MASS INDEX (BMI) DOCUMENTED: ICD-10-PCS | Mod: CPTII,S$GLB,, | Performed by: OBSTETRICS & GYNECOLOGY

## 2021-07-22 PROCEDURE — 99999 PR PBB SHADOW E&M-EST. PATIENT-LVL III: ICD-10-PCS | Mod: PBBFAC,,, | Performed by: OBSTETRICS & GYNECOLOGY

## 2021-07-22 PROCEDURE — 3008F BODY MASS INDEX DOCD: CPT | Mod: CPTII,S$GLB,, | Performed by: OBSTETRICS & GYNECOLOGY

## 2021-07-22 PROCEDURE — 88142 CYTOPATH C/V THIN LAYER: CPT | Performed by: OBSTETRICS & GYNECOLOGY

## 2021-07-29 ENCOUNTER — OFFICE VISIT (OUTPATIENT)
Dept: FAMILY MEDICINE | Facility: CLINIC | Age: 56
End: 2021-07-29
Payer: COMMERCIAL

## 2021-07-29 VITALS
OXYGEN SATURATION: 98 % | HEART RATE: 76 BPM | TEMPERATURE: 97 F | SYSTOLIC BLOOD PRESSURE: 122 MMHG | DIASTOLIC BLOOD PRESSURE: 70 MMHG | BODY MASS INDEX: 37.39 KG/M2 | WEIGHT: 238.75 LBS

## 2021-07-29 DIAGNOSIS — Z00.00 ANNUAL PHYSICAL EXAM: Primary | ICD-10-CM

## 2021-07-29 DIAGNOSIS — Z12.11 SCREENING FOR MALIGNANT NEOPLASM OF COLON: ICD-10-CM

## 2021-07-29 DIAGNOSIS — E66.09 CLASS 2 OBESITY DUE TO EXCESS CALORIES WITHOUT SERIOUS COMORBIDITY WITH BODY MASS INDEX (BMI) OF 37.0 TO 37.9 IN ADULT: ICD-10-CM

## 2021-07-29 DIAGNOSIS — E55.9 VITAMIN D DEFICIENCY: ICD-10-CM

## 2021-07-29 DIAGNOSIS — I10 ESSENTIAL HYPERTENSION: ICD-10-CM

## 2021-07-29 PROBLEM — E66.812 CLASS 2 OBESITY DUE TO EXCESS CALORIES WITHOUT SERIOUS COMORBIDITY WITH BODY MASS INDEX (BMI) OF 37.0 TO 37.9 IN ADULT: Status: ACTIVE | Noted: 2021-07-29

## 2021-07-29 LAB
FINAL PATHOLOGIC DIAGNOSIS: NORMAL
Lab: NORMAL

## 2021-07-29 PROCEDURE — 1126F PR PAIN SEVERITY QUANTIFIED, NO PAIN PRESENT: ICD-10-PCS | Mod: CPTII,S$GLB,, | Performed by: INTERNAL MEDICINE

## 2021-07-29 PROCEDURE — 3074F PR MOST RECENT SYSTOLIC BLOOD PRESSURE < 130 MM HG: ICD-10-PCS | Mod: CPTII,S$GLB,, | Performed by: INTERNAL MEDICINE

## 2021-07-29 PROCEDURE — 3078F PR MOST RECENT DIASTOLIC BLOOD PRESSURE < 80 MM HG: ICD-10-PCS | Mod: CPTII,S$GLB,, | Performed by: INTERNAL MEDICINE

## 2021-07-29 PROCEDURE — 3008F PR BODY MASS INDEX (BMI) DOCUMENTED: ICD-10-PCS | Mod: CPTII,S$GLB,, | Performed by: INTERNAL MEDICINE

## 2021-07-29 PROCEDURE — 99396 PR PREVENTIVE VISIT,EST,40-64: ICD-10-PCS | Mod: S$GLB,,, | Performed by: INTERNAL MEDICINE

## 2021-07-29 PROCEDURE — 3074F SYST BP LT 130 MM HG: CPT | Mod: CPTII,S$GLB,, | Performed by: INTERNAL MEDICINE

## 2021-07-29 PROCEDURE — 99396 PREV VISIT EST AGE 40-64: CPT | Mod: S$GLB,,, | Performed by: INTERNAL MEDICINE

## 2021-07-29 PROCEDURE — 3008F BODY MASS INDEX DOCD: CPT | Mod: CPTII,S$GLB,, | Performed by: INTERNAL MEDICINE

## 2021-07-29 PROCEDURE — 1159F MED LIST DOCD IN RCRD: CPT | Mod: CPTII,S$GLB,, | Performed by: INTERNAL MEDICINE

## 2021-07-29 PROCEDURE — 99999 PR PBB SHADOW E&M-EST. PATIENT-LVL III: ICD-10-PCS | Mod: PBBFAC,,, | Performed by: INTERNAL MEDICINE

## 2021-07-29 PROCEDURE — 99999 PR PBB SHADOW E&M-EST. PATIENT-LVL III: CPT | Mod: PBBFAC,,, | Performed by: INTERNAL MEDICINE

## 2021-07-29 PROCEDURE — 1126F AMNT PAIN NOTED NONE PRSNT: CPT | Mod: CPTII,S$GLB,, | Performed by: INTERNAL MEDICINE

## 2021-07-29 PROCEDURE — 1159F PR MEDICATION LIST DOCUMENTED IN MEDICAL RECORD: ICD-10-PCS | Mod: CPTII,S$GLB,, | Performed by: INTERNAL MEDICINE

## 2021-07-29 PROCEDURE — 3078F DIAST BP <80 MM HG: CPT | Mod: CPTII,S$GLB,, | Performed by: INTERNAL MEDICINE

## 2021-07-29 RX ORDER — PHENTERMINE HYDROCHLORIDE 37.5 MG/1
37.5 TABLET ORAL
Qty: 30 TABLET | Refills: 0 | Status: SHIPPED | OUTPATIENT
Start: 2021-07-29 | End: 2021-08-29

## 2021-08-03 DIAGNOSIS — R73.01 IMPAIRED FASTING GLUCOSE: Primary | ICD-10-CM

## 2021-08-03 DIAGNOSIS — E55.9 VITAMIN D DEFICIENCY: ICD-10-CM

## 2021-08-03 RX ORDER — ERGOCALCIFEROL 1.25 MG/1
50000 CAPSULE ORAL
Qty: 12 CAPSULE | Refills: 3 | OUTPATIENT
Start: 2021-08-03 | End: 2022-01-11

## 2021-09-29 ENCOUNTER — PATIENT MESSAGE (OUTPATIENT)
Dept: GASTROENTEROLOGY | Facility: CLINIC | Age: 56
End: 2021-09-29

## 2021-10-28 ENCOUNTER — OFFICE VISIT (OUTPATIENT)
Dept: PSYCHIATRY | Facility: CLINIC | Age: 56
End: 2021-10-28
Payer: COMMERCIAL

## 2021-10-28 DIAGNOSIS — R69 DIAGNOSIS DEFERRED: Primary | ICD-10-CM

## 2021-10-28 PROCEDURE — 90834 PSYTX W PT 45 MINUTES: CPT | Mod: S$GLB,,, | Performed by: SOCIAL WORKER

## 2021-10-28 PROCEDURE — 99999 PR PBB SHADOW E&M-EST. PATIENT-LVL II: CPT | Mod: PBBFAC,,, | Performed by: SOCIAL WORKER

## 2021-10-28 PROCEDURE — 90834 PR PSYCHOTHERAPY W/PATIENT, 45 MIN: ICD-10-PCS | Mod: S$GLB,,, | Performed by: SOCIAL WORKER

## 2021-10-28 PROCEDURE — 99999 PR PBB SHADOW E&M-EST. PATIENT-LVL II: ICD-10-PCS | Mod: PBBFAC,,, | Performed by: SOCIAL WORKER

## 2021-10-29 ENCOUNTER — OFFICE VISIT (OUTPATIENT)
Dept: FAMILY MEDICINE | Facility: CLINIC | Age: 56
End: 2021-10-29
Payer: COMMERCIAL

## 2021-10-29 VITALS
OXYGEN SATURATION: 98 % | WEIGHT: 240.06 LBS | TEMPERATURE: 98 F | SYSTOLIC BLOOD PRESSURE: 124 MMHG | DIASTOLIC BLOOD PRESSURE: 82 MMHG | HEART RATE: 87 BPM | BODY MASS INDEX: 37.6 KG/M2

## 2021-10-29 DIAGNOSIS — E66.09 CLASS 2 OBESITY DUE TO EXCESS CALORIES WITHOUT SERIOUS COMORBIDITY WITH BODY MASS INDEX (BMI) OF 37.0 TO 37.9 IN ADULT: ICD-10-CM

## 2021-10-29 DIAGNOSIS — I10 ESSENTIAL HYPERTENSION: ICD-10-CM

## 2021-10-29 PROCEDURE — 1159F PR MEDICATION LIST DOCUMENTED IN MEDICAL RECORD: ICD-10-PCS | Mod: CPTII,S$GLB,, | Performed by: INTERNAL MEDICINE

## 2021-10-29 PROCEDURE — 3044F PR MOST RECENT HEMOGLOBIN A1C LEVEL <7.0%: ICD-10-PCS | Mod: CPTII,S$GLB,, | Performed by: INTERNAL MEDICINE

## 2021-10-29 PROCEDURE — 3044F HG A1C LEVEL LT 7.0%: CPT | Mod: CPTII,S$GLB,, | Performed by: INTERNAL MEDICINE

## 2021-10-29 PROCEDURE — 3074F PR MOST RECENT SYSTOLIC BLOOD PRESSURE < 130 MM HG: ICD-10-PCS | Mod: CPTII,S$GLB,, | Performed by: INTERNAL MEDICINE

## 2021-10-29 PROCEDURE — 3074F SYST BP LT 130 MM HG: CPT | Mod: CPTII,S$GLB,, | Performed by: INTERNAL MEDICINE

## 2021-10-29 PROCEDURE — 99214 PR OFFICE/OUTPT VISIT, EST, LEVL IV, 30-39 MIN: ICD-10-PCS | Mod: S$GLB,,, | Performed by: INTERNAL MEDICINE

## 2021-10-29 PROCEDURE — 3079F PR MOST RECENT DIASTOLIC BLOOD PRESSURE 80-89 MM HG: ICD-10-PCS | Mod: CPTII,S$GLB,, | Performed by: INTERNAL MEDICINE

## 2021-10-29 PROCEDURE — 99999 PR PBB SHADOW E&M-EST. PATIENT-LVL III: CPT | Mod: PBBFAC,,, | Performed by: INTERNAL MEDICINE

## 2021-10-29 PROCEDURE — 3079F DIAST BP 80-89 MM HG: CPT | Mod: CPTII,S$GLB,, | Performed by: INTERNAL MEDICINE

## 2021-10-29 PROCEDURE — 99214 OFFICE O/P EST MOD 30 MIN: CPT | Mod: S$GLB,,, | Performed by: INTERNAL MEDICINE

## 2021-10-29 PROCEDURE — 3008F PR BODY MASS INDEX (BMI) DOCUMENTED: ICD-10-PCS | Mod: CPTII,S$GLB,, | Performed by: INTERNAL MEDICINE

## 2021-10-29 PROCEDURE — 3008F BODY MASS INDEX DOCD: CPT | Mod: CPTII,S$GLB,, | Performed by: INTERNAL MEDICINE

## 2021-10-29 PROCEDURE — 99999 PR PBB SHADOW E&M-EST. PATIENT-LVL III: ICD-10-PCS | Mod: PBBFAC,,, | Performed by: INTERNAL MEDICINE

## 2021-10-29 PROCEDURE — 1159F MED LIST DOCD IN RCRD: CPT | Mod: CPTII,S$GLB,, | Performed by: INTERNAL MEDICINE

## 2021-10-29 RX ORDER — PHENTERMINE HYDROCHLORIDE 37.5 MG/1
37.5 TABLET ORAL
Qty: 30 TABLET | Refills: 0 | Status: SHIPPED | OUTPATIENT
Start: 2021-10-29 | End: 2021-12-04

## 2021-12-08 RX ORDER — POLYETHYLENE GLYCOL 3350, SODIUM SULFATE ANHYDROUS, SODIUM BICARBONATE, SODIUM CHLORIDE, POTASSIUM CHLORIDE 236; 22.74; 6.74; 5.86; 2.97 G/4L; G/4L; G/4L; G/4L; G/4L
4 POWDER, FOR SOLUTION ORAL ONCE
Qty: 4000 ML | Refills: 0 | Status: SHIPPED | OUTPATIENT
Start: 2021-12-08 | End: 2021-12-08

## 2022-01-07 ENCOUNTER — PATIENT MESSAGE (OUTPATIENT)
Dept: FAMILY MEDICINE | Facility: CLINIC | Age: 57
End: 2022-01-07
Payer: COMMERCIAL

## 2022-01-10 ENCOUNTER — PATIENT MESSAGE (OUTPATIENT)
Dept: FAMILY MEDICINE | Facility: CLINIC | Age: 57
End: 2022-01-10
Payer: COMMERCIAL

## 2022-01-11 ENCOUNTER — TELEPHONE (OUTPATIENT)
Dept: FAMILY MEDICINE | Facility: CLINIC | Age: 57
End: 2022-01-11
Payer: COMMERCIAL

## 2022-01-11 ENCOUNTER — HOSPITAL ENCOUNTER (EMERGENCY)
Facility: HOSPITAL | Age: 57
Discharge: HOME | End: 2022-01-11
Attending: EMERGENCY MEDICINE
Payer: COMMERCIAL

## 2022-01-11 VITALS
WEIGHT: 235 LBS | RESPIRATION RATE: 16 BRPM | DIASTOLIC BLOOD PRESSURE: 67 MMHG | SYSTOLIC BLOOD PRESSURE: 149 MMHG | TEMPERATURE: 99 F | BODY MASS INDEX: 36.88 KG/M2 | HEIGHT: 67 IN | HEART RATE: 71 BPM | OXYGEN SATURATION: 100 %

## 2022-01-11 DIAGNOSIS — R06.02 SHORTNESS OF BREATH: ICD-10-CM

## 2022-01-11 DIAGNOSIS — R00.2 PALPITATIONS: Primary | ICD-10-CM

## 2022-01-11 LAB
ALBUMIN SERPL BCP-MCNC: 4.1 G/DL (ref 3.5–5.2)
ALP SERPL-CCNC: 77 U/L (ref 55–135)
ALT SERPL W/O P-5'-P-CCNC: 56 U/L (ref 10–44)
ANION GAP SERPL CALC-SCNC: 12 MMOL/L (ref 8–16)
AST SERPL-CCNC: 46 U/L (ref 10–40)
BASOPHILS # BLD AUTO: 0.07 K/UL (ref 0–0.2)
BASOPHILS NFR BLD: 0.9 % (ref 0–1.9)
BILIRUB SERPL-MCNC: 0.2 MG/DL (ref 0.1–1)
BUN SERPL-MCNC: 14 MG/DL (ref 6–20)
CALCIUM SERPL-MCNC: 9.7 MG/DL (ref 8.7–10.5)
CHLORIDE SERPL-SCNC: 106 MMOL/L (ref 95–110)
CO2 SERPL-SCNC: 25 MMOL/L (ref 23–29)
CREAT SERPL-MCNC: 0.8 MG/DL (ref 0.5–1.4)
DIFFERENTIAL METHOD: ABNORMAL
EOSINOPHIL # BLD AUTO: 0.3 K/UL (ref 0–0.5)
EOSINOPHIL NFR BLD: 3.1 % (ref 0–8)
ERYTHROCYTE [DISTWIDTH] IN BLOOD BY AUTOMATED COUNT: 14.6 % (ref 11.5–14.5)
EST. GFR  (AFRICAN AMERICAN): >60 ML/MIN/1.73 M^2
EST. GFR  (NON AFRICAN AMERICAN): >60 ML/MIN/1.73 M^2
GLUCOSE SERPL-MCNC: 112 MG/DL (ref 70–110)
HCT VFR BLD AUTO: 41.3 % (ref 37–48.5)
HGB BLD-MCNC: 12.7 G/DL (ref 12–16)
IMM GRANULOCYTES # BLD AUTO: 0.02 K/UL (ref 0–0.04)
IMM GRANULOCYTES NFR BLD AUTO: 0.2 % (ref 0–0.5)
LYMPHOCYTES # BLD AUTO: 2.3 K/UL (ref 1–4.8)
LYMPHOCYTES NFR BLD: 28.5 % (ref 18–48)
MCH RBC QN AUTO: 25.2 PG (ref 27–31)
MCHC RBC AUTO-ENTMCNC: 30.8 G/DL (ref 32–36)
MCV RBC AUTO: 82 FL (ref 82–98)
MONOCYTES # BLD AUTO: 0.3 K/UL (ref 0.3–1)
MONOCYTES NFR BLD: 4.1 % (ref 4–15)
NEUTROPHILS # BLD AUTO: 5.1 K/UL (ref 1.8–7.7)
NEUTROPHILS NFR BLD: 63.2 % (ref 38–73)
NRBC BLD-RTO: 0 /100 WBC
PLATELET # BLD AUTO: 339 K/UL (ref 150–450)
PMV BLD AUTO: 9.7 FL (ref 9.2–12.9)
POTASSIUM SERPL-SCNC: 3.9 MMOL/L (ref 3.5–5.1)
PROT SERPL-MCNC: 7.7 G/DL (ref 6–8.4)
RBC # BLD AUTO: 5.03 M/UL (ref 4–5.4)
SODIUM SERPL-SCNC: 143 MMOL/L (ref 136–145)
TROPONIN I SERPL DL<=0.01 NG/ML-MCNC: 0.01 NG/ML (ref 0–0.03)
TSH SERPL DL<=0.005 MIU/L-ACNC: 1.78 UIU/ML (ref 0.4–4)
WBC # BLD AUTO: 8.04 K/UL (ref 3.9–12.7)

## 2022-01-11 PROCEDURE — 85025 COMPLETE CBC W/AUTO DIFF WBC: CPT | Performed by: NURSE PRACTITIONER

## 2022-01-11 PROCEDURE — 36415 COLL VENOUS BLD VENIPUNCTURE: CPT | Performed by: NURSE PRACTITIONER

## 2022-01-11 PROCEDURE — U0003 INFECTIOUS AGENT DETECTION BY NUCLEIC ACID (DNA OR RNA); SEVERE ACUTE RESPIRATORY SYNDROME CORONAVIRUS 2 (SARS-COV-2) (CORONAVIRUS DISEASE [COVID-19]), AMPLIFIED PROBE TECHNIQUE, MAKING USE OF HIGH THROUGHPUT TECHNOLOGIES AS DESCRIBED BY CMS-2020-01-R: HCPCS | Performed by: NURSE PRACTITIONER

## 2022-01-11 PROCEDURE — 93005 ELECTROCARDIOGRAM TRACING: CPT

## 2022-01-11 PROCEDURE — 93010 EKG 12-LEAD: ICD-10-PCS | Mod: ,,, | Performed by: INTERNAL MEDICINE

## 2022-01-11 PROCEDURE — 84443 ASSAY THYROID STIM HORMONE: CPT | Performed by: NURSE PRACTITIONER

## 2022-01-11 PROCEDURE — 84484 ASSAY OF TROPONIN QUANT: CPT | Performed by: NURSE PRACTITIONER

## 2022-01-11 PROCEDURE — 80053 COMPREHEN METABOLIC PANEL: CPT | Performed by: NURSE PRACTITIONER

## 2022-01-11 PROCEDURE — 93010 ELECTROCARDIOGRAM REPORT: CPT | Mod: ,,, | Performed by: INTERNAL MEDICINE

## 2022-01-11 PROCEDURE — U0005 INFEC AGEN DETEC AMPLI PROBE: HCPCS | Performed by: NURSE PRACTITIONER

## 2022-01-11 PROCEDURE — 99285 EMERGENCY DEPT VISIT HI MDM: CPT | Mod: 25

## 2022-01-11 NOTE — TELEPHONE ENCOUNTER
Pt came in office today complaining of chest palpitations. Pt informed me that she is having chest pains as well. Pt states that she was messaging PCP. Consulted with NP in office regarding pt, NP recommended that pt go to ER since palpitations and chest pain increased. Informed pt that I would let Dr. Martinez know. Pt verbalized understanding.

## 2022-01-12 LAB
SARS-COV-2 RNA RESP QL NAA+PROBE: NOT DETECTED
SARS-COV-2- CYCLE NUMBER: NORMAL

## 2022-01-12 NOTE — ED NOTES
Upon discharge, patient is AAOx4, no cardiac or respiratory complications. Follow up care reviewed with patient and has been instructed to return to the ER if needed. Patient verbalized understanding and ambulated to the lobby without difficulty. EMY MEHTA

## 2022-01-12 NOTE — ED PROVIDER NOTES
Encounter Date: 2022    SCRIBE #1 NOTE: I, Isabel Thakkar, am scribing for, and in the presence of, Jona Gifford III, MD.       History     Chief Complaint   Patient presents with    Palpitations    Cough     Time seen by provider: 9:39 PM on 2022    Alejandrina Garrison is a 56 y.o. female who presents to the ED with an onset of palpitations. Patient has been experiencing palpitations over the last 4-5 days but believed chest discomfort to be GI-related and has been taking Tums for symptoms which have provided little to no relief. She contacted PCP yesterday regarding symptoms noting recent changes in diet and increase in exercise activity. She was advised to drink more water and discontinue caffeine consumption. Still believing palpitations to be GI-related, she reports taking laxatives today which have provided no improvement. She was eating lunch today when she began experiencing worsening palpitations with more of achiness in her chest, as well as SOB. Pt was seen by NP at PCP's office and was referred to ED for further evaluation. The patient currently c/o soreness in her chest which she attributes to exercise but denies CP, chest tightness or any other symptoms at this time. PMHx of HTN. No pertinent PSHx.    The history is provided by the patient.     Review of patient's allergies indicates:   Allergen Reactions    Morphine Anaphylaxis, Hives, Itching and Shortness Of Breath    Compazine [prochlorperazine]      Past Medical History:   Diagnosis Date    Hypertension      Past Surgical History:   Procedure Laterality Date     SECTION  1988    x3, 1998, ,     DILATION AND CURETTAGE OF UTERUS          right tubal       SALPINGOOPHORECTOMY      left    TUBAL LIGATION  2008     Family History   Problem Relation Age of Onset    Hypertension Mother     Osteoporosis Mother     Cancer Father     Leukemia Father     Hypertension Sister     Colon cancer Maternal  Grandmother     Arthritis Paternal Grandmother     Breast cancer Neg Hx     Ovarian cancer Neg Hx      Social History     Tobacco Use    Smoking status: Never Smoker    Smokeless tobacco: Never Used   Substance Use Topics    Alcohol use: Yes     Alcohol/week: 1.0 standard drink     Types: 1 Glasses of wine per week     Comment: Social drink, glass of wine if eating out    Drug use: Never     Review of Systems   Constitutional: Negative for activity change, appetite change, chills, fatigue and fever.   Eyes: Negative for visual disturbance.   Respiratory: Positive for shortness of breath. Negative for apnea and chest tightness.    Cardiovascular: Positive for palpitations. Negative for chest pain.        Positive for chest ache.   Gastrointestinal: Negative for abdominal distention and abdominal pain.   Genitourinary: Negative for difficulty urinating.   Musculoskeletal: Negative for neck pain.   Skin: Negative for pallor and rash.   Neurological: Negative for headaches.   Hematological: Does not bruise/bleed easily.   Psychiatric/Behavioral: Negative for agitation.       Physical Exam     Initial Vitals [01/11/22 2001]   BP Pulse Resp Temp SpO2   (!) 152/85 84 18 98.1 °F (36.7 °C) 97 %      MAP       --         Physical Exam    Nursing note and vitals reviewed.  Constitutional: She appears well-developed and well-nourished.   HENT:   Head: Normocephalic and atraumatic.   Eyes: Conjunctivae are normal.   Neck: Neck supple.   Normal range of motion.  Cardiovascular: Normal rate, regular rhythm and normal heart sounds. Exam reveals no gallop and no friction rub.    No murmur heard.  Pulmonary/Chest: Effort normal and breath sounds normal. No respiratory distress. She has no wheezes. She has no rhonchi. She has no rales.   Abdominal: Abdomen is soft. She exhibits no distension. There is no abdominal tenderness.   Genitourinary:    No vaginal discharge.     Musculoskeletal:         General: Normal range of motion.       Cervical back: Normal range of motion and neck supple.     Neurological: She is alert and oriented to person, place, and time.   Skin: Skin is warm and dry. No erythema.   Psychiatric: She has a normal mood and affect.         ED Course   Procedures  Labs Reviewed   CBC W/ AUTO DIFFERENTIAL - Abnormal; Notable for the following components:       Result Value    MCH 25.2 (*)     MCHC 30.8 (*)     RDW 14.6 (*)     All other components within normal limits   COMPREHENSIVE METABOLIC PANEL - Abnormal; Notable for the following components:    Glucose 112 (*)     AST 46 (*)     ALT 56 (*)     All other components within normal limits   TROPONIN I   TSH   SARS-COV-2 (COVID-19) QUALITATIVE PCR     EKG Readings: (Independently Interpreted)   Initial Reading: No STEMI. Rhythm: Normal Sinus Rhythm. Heart Rate: 83. Ectopy: No Ectopy. Conduction: Normal. ST Segments: Normal ST Segments. T Waves: Normal. Clinical Impression: Normal Sinus Rhythm       Imaging Results          X-Ray Chest 1 View (Final result)  Result time 01/11/22 20:22:14    Final result by Mikey Wall MD (01/11/22 20:22:14)                 Impression:      No convincing evidence of acute cardiopulmonary disease.      Electronically signed by: Mikey Wall  Date:    01/11/2022  Time:    20:22             Narrative:    EXAMINATION:  XR CHEST 1 VIEW    CLINICAL HISTORY:  shortness of breath;    TECHNIQUE:  Single frontal view of the chest was performed.    COMPARISON:  None    FINDINGS:  Cardiomediastinal silhouette appears to be within normal limits.  Lungs appear essentially clear.  No definite pneumothorax or pleural effusion.  No acute findings are suggested visualized abdomen.  Osseous and soft tissue structures appear without definite acute finding.                                 Medications - No data to display  Medical Decision Making:   History:   Old Medical Records: I decided to obtain old medical records.  Independently Interpreted  Test(s):   I have ordered and independently interpreted EKG Reading(s) - see prior notes  Clinical Tests:   Lab Tests: Reviewed and Ordered  Radiological Study: Reviewed and Ordered  Medical Tests: Reviewed and Ordered  ED Management:  56-year-old female presents with intermittent palpitations.  She has no ongoing chest discomfort.  EKG fails to demonstrate any evidence of ischemia/MI.  This may represent mitral valve prolapse.  She is encouraged to refrain from any caffeine use and is referred to cardiology.         APC / Resident Notes:   I, Dr. Jona Gifford III, personally performed the services described in this documentation. All medical record entries made by the scribe were at my direction and in my presence.  I have reviewed the chart and agree that the record reflects my personal performance and is accurate and complete     Scribe Attestation:   Scribe #1: I performed the above scribed service and the documentation accurately describes the services I performed. I attest to the accuracy of the note.                 Clinical Impression:   Final diagnoses:  [R06.02] Shortness of breath  [R00.2] Palpitations (Primary)          ED Disposition Condition    Discharge Stable        ED Prescriptions     None        Follow-up Information     Follow up With Specialties Details Why Contact Info    Angel Cole MD INTERVENTIONAL CARDIOLOGY, Cardiology In 3 days  [address] [place] [address]  [facility]  [facility] [address]  [phone]             Jona Gifford III, MD  01/11/22 7017

## 2022-01-12 NOTE — FIRST PROVIDER EVALUATION
Emergency Department TeleTriage Encounter Note      CHIEF COMPLAINT    Chief Complaint   Patient presents with    Palpitations    Cough       VITAL SIGNS   Initial Vitals [01/11/22 2001]   BP Pulse Resp Temp SpO2   (!) 152/85 84 18 98.1 °F (36.7 °C) 97 %      MAP       --            ALLERGIES    Review of patient's allergies indicates:   Allergen Reactions    Morphine Anaphylaxis, Hives, Itching and Shortness Of Breath    Compazine [prochlorperazine]        PROVIDER TRIAGE NOTE  This is a teletriage evaluation of a 56 y.o. female presenting to the ED with c/o palpitations and shortness of breath. Ongoing x 4 days. Initial orders will be placed and care will be transferred to an alternate provider when patient is roomed for a full evaluation. Any additional orders and the final disposition will be determined by that provider.         ORDERS  Labs Reviewed - No data to display    ED Orders (720h ago, onward)    Start Ordered     Status Ordering Provider    01/11/22 2011 01/11/22 2010  Pulse Oximetry Continuous  Continuous         Ordered MACEY MORE    01/11/22 2011 01/11/22 2010  Cardiac Monitoring - Adult  Continuous         Ordered MACEY MORE    Unscheduled 01/11/22 2010  CBC Auto Differential  STAT         Ordered MACEY MORE    Unscheduled 01/11/22 2010  Comprehensive Metabolic Panel  STAT         Ordered MACEY MORE    Unscheduled 01/11/22 2010  EKG 12-lead  Once         Ordered MACEY MORE    Unscheduled 01/11/22 2010  X-Ray Chest 1 View  1 time imaging         Ordered MACEY MORE    Unscheduled 01/11/22 2010  COVID-19 Routine Screening  STAT         Ordered MACEY MORE    Unscheduled 01/11/22 2010  Troponin I  STAT         Ordered MACEY MORE    Unscheduled 01/11/22 2010  TSH  STAT         Ordered MACEY MORE            Virtual Visit Note: The provider triage portion of this emergency department evaluation and documentation was performed via to bect, a  HIPAA-compliant telemedicine application, in concert with a tele-presenter in the room. A face to face patient evaluation with one of my colleagues will occur once the patient is placed in an emergency department room.      DISCLAIMER: This note was prepared with [employer] voice recognition transcription software. Garbled syntax, mangled pronouns, and other bizarre constructions may be attributed to that software system.

## 2022-01-12 NOTE — ED NOTES
Alejandrina Garrison presents to the ED with c/o palpitations that started on Saturday. Patient reports that she has been working out and eating healthy. Patient states a cough with her palpitations when she tries to stop them. AAOx4. Mucous membranes are pink and moist. Skin is warm, dry and intact.

## 2022-01-13 ENCOUNTER — PATIENT MESSAGE (OUTPATIENT)
Dept: FAMILY MEDICINE | Facility: CLINIC | Age: 57
End: 2022-01-13
Payer: COMMERCIAL

## 2022-01-13 ENCOUNTER — TELEPHONE (OUTPATIENT)
Dept: CARDIOLOGY | Facility: CLINIC | Age: 57
End: 2022-01-13
Payer: COMMERCIAL

## 2022-01-13 NOTE — TELEPHONE ENCOUNTER
----- Message from Segundo Mendez sent at 1/13/2022  2:52 PM CST -----  Contact: pt  Type: Needs Medical Advice    Who Called:pt  Best Call Back Number:[phone]    Additional Information: Requesting callback regarding needing to see VB as a follow up from ER visit pt has Palpitations needs an appt within a week, can be another cardiologist     Please Advise-Thank you

## 2022-01-18 ENCOUNTER — TELEPHONE (OUTPATIENT)
Dept: CARDIOLOGY | Facility: CLINIC | Age: 57
End: 2022-01-18
Payer: COMMERCIAL

## 2022-01-18 NOTE — TELEPHONE ENCOUNTER
----- Message from Homer Win sent at 1/18/2022  1:20 PM CST -----  Please call patient to schedule an appointment,  PH# [phone].    THANKS.

## 2022-01-20 ENCOUNTER — PATIENT MESSAGE (OUTPATIENT)
Dept: FAMILY MEDICINE | Facility: CLINIC | Age: 57
End: 2022-01-20
Payer: COMMERCIAL

## 2022-01-21 ENCOUNTER — PATIENT MESSAGE (OUTPATIENT)
Dept: FAMILY MEDICINE | Facility: CLINIC | Age: 57
End: 2022-01-21
Payer: COMMERCIAL

## 2022-01-24 ENCOUNTER — PATIENT MESSAGE (OUTPATIENT)
Dept: ADMINISTRATIVE | Facility: OTHER | Age: 57
End: 2022-01-24
Payer: COMMERCIAL

## 2022-01-30 ENCOUNTER — PATIENT OUTREACH (OUTPATIENT)
Dept: ADMINISTRATIVE | Facility: OTHER | Age: 57
End: 2022-01-30
Payer: COMMERCIAL

## 2022-01-31 ENCOUNTER — TELEPHONE (OUTPATIENT)
Dept: CARDIOLOGY | Facility: CLINIC | Age: 57
End: 2022-01-31

## 2022-01-31 ENCOUNTER — PATIENT MESSAGE (OUTPATIENT)
Dept: CARDIOLOGY | Facility: CLINIC | Age: 57
End: 2022-01-31

## 2022-01-31 ENCOUNTER — OFFICE VISIT (OUTPATIENT)
Dept: CARDIOLOGY | Facility: CLINIC | Age: 57
End: 2022-01-31
Payer: COMMERCIAL

## 2022-01-31 VITALS
OXYGEN SATURATION: 100 % | DIASTOLIC BLOOD PRESSURE: 72 MMHG | HEIGHT: 67 IN | HEART RATE: 79 BPM | SYSTOLIC BLOOD PRESSURE: 128 MMHG | BODY MASS INDEX: 37.51 KG/M2 | WEIGHT: 239 LBS

## 2022-01-31 DIAGNOSIS — R00.2 PALPITATIONS: ICD-10-CM

## 2022-01-31 DIAGNOSIS — R06.02 SOBOE (SHORTNESS OF BREATH ON EXERTION): ICD-10-CM

## 2022-01-31 DIAGNOSIS — I10 ESSENTIAL HYPERTENSION: ICD-10-CM

## 2022-01-31 DIAGNOSIS — E66.09 CLASS 2 OBESITY DUE TO EXCESS CALORIES WITHOUT SERIOUS COMORBIDITY WITH BODY MASS INDEX (BMI) OF 37.0 TO 37.9 IN ADULT: ICD-10-CM

## 2022-01-31 PROCEDURE — 1159F MED LIST DOCD IN RCRD: CPT | Mod: CPTII,S$GLB,, | Performed by: INTERNAL MEDICINE

## 2022-01-31 PROCEDURE — 3008F PR BODY MASS INDEX (BMI) DOCUMENTED: ICD-10-PCS | Mod: CPTII,S$GLB,, | Performed by: INTERNAL MEDICINE

## 2022-01-31 PROCEDURE — 3078F DIAST BP <80 MM HG: CPT | Mod: CPTII,S$GLB,, | Performed by: INTERNAL MEDICINE

## 2022-01-31 PROCEDURE — 3008F BODY MASS INDEX DOCD: CPT | Mod: CPTII,S$GLB,, | Performed by: INTERNAL MEDICINE

## 2022-01-31 PROCEDURE — 99999 PR PBB SHADOW E&M-EST. PATIENT-LVL III: CPT | Mod: PBBFAC,,, | Performed by: INTERNAL MEDICINE

## 2022-01-31 PROCEDURE — 99999 PR PBB SHADOW E&M-EST. PATIENT-LVL III: ICD-10-PCS | Mod: PBBFAC,,, | Performed by: INTERNAL MEDICINE

## 2022-01-31 PROCEDURE — 3078F PR MOST RECENT DIASTOLIC BLOOD PRESSURE < 80 MM HG: ICD-10-PCS | Mod: CPTII,S$GLB,, | Performed by: INTERNAL MEDICINE

## 2022-01-31 PROCEDURE — 3074F SYST BP LT 130 MM HG: CPT | Mod: CPTII,S$GLB,, | Performed by: INTERNAL MEDICINE

## 2022-01-31 PROCEDURE — 1159F PR MEDICATION LIST DOCUMENTED IN MEDICAL RECORD: ICD-10-PCS | Mod: CPTII,S$GLB,, | Performed by: INTERNAL MEDICINE

## 2022-01-31 PROCEDURE — 99204 OFFICE O/P NEW MOD 45 MIN: CPT | Mod: S$GLB,,, | Performed by: INTERNAL MEDICINE

## 2022-01-31 PROCEDURE — 1160F PR REVIEW ALL MEDS BY PRESCRIBER/CLIN PHARMACIST DOCUMENTED: ICD-10-PCS | Mod: CPTII,S$GLB,, | Performed by: INTERNAL MEDICINE

## 2022-01-31 PROCEDURE — 1160F RVW MEDS BY RX/DR IN RCRD: CPT | Mod: CPTII,S$GLB,, | Performed by: INTERNAL MEDICINE

## 2022-01-31 PROCEDURE — 3074F PR MOST RECENT SYSTOLIC BLOOD PRESSURE < 130 MM HG: ICD-10-PCS | Mod: CPTII,S$GLB,, | Performed by: INTERNAL MEDICINE

## 2022-01-31 PROCEDURE — 99204 PR OFFICE/OUTPT VISIT, NEW, LEVL IV, 45-59 MIN: ICD-10-PCS | Mod: S$GLB,,, | Performed by: INTERNAL MEDICINE

## 2022-01-31 NOTE — ASSESSMENT & PLAN NOTE
Pt notes roy which may be related to habitus given weight gain however pt w/ risk factors for CAD.    - check ETT to eval for ischemia   - check echo to eval cardiac function

## 2022-01-31 NOTE — TELEPHONE ENCOUNTER
Spoke with department for Holter monitor scheduling and was told it was okay to put patient on for 2/10 at 12:45 pm

## 2022-01-31 NOTE — PROGRESS NOTES
Health Maintenance Due   Topic Date Due    TETANUS VACCINE  Never done    Colorectal Cancer Screening  Never done    Shingles Vaccine (1 of 2) Never done    COVID-19 Vaccine (3 - Booster for Pfizer series) 07/06/2021     Updates were requested from care everywhere.  Chart was reviewed for overdue Proactive [facility] Encounters ([facility]) topics (CRS, Breast Cancer Screening, Eye exam)  Health Maintenance has been updated.  LINKS immunization registry triggered.  Immunizations were reconciled.  Pt scheduled for colonoscopy 02/24/2022.

## 2022-01-31 NOTE — PATIENT INSTRUCTIONS
"Patient Education       Diet and Health   The Basics   Written by the doctors and editors at [facility]   Why is it important to eat a healthy diet? -- It's important to eat a healthy diet because eating the right foods can keep you healthy now and later on in life.  Which foods are especially healthy? -- Foods that are especially healthy include:  · Fruits and vegetables - Eating a diet with lots of fruits and vegetables can help prevent heart disease and strokes. It might also help prevent certain types of cancers. Try to eat fruits and vegetables at each meal and also for snacks. If you don't have fresh fruits and vegetables available, you can eat frozen or canned ones instead. Doctors recommend eating at least 2 1/2 servings of vegetables and 2 servings of fruits each day.  · Foods with fiber - Eating foods with a lot of fiber can help prevent heart disease and strokes. If you have type 2 diabetes, it can also help control your blood sugar. Foods that have a lot of fiber include vegetables, fruits, beans, nuts, oatmeal, and whole grain breads and cereals. You can tell how much fiber is in a food by reading the nutrition label (figure 1). Doctors recommend eating 25 to 36 grams of fiber each day.  · Foods with folate (also called folic acid) - Folate is a vitamin that is important for pregnant people, since it helps prevent certain birth defects. Anyone who could get pregnant should get at least 400 micrograms of folic acid daily, whether or not they are actively trying to get pregnant. Folate is found in many breakfast cereals, oranges, orange juice, and green leafy vegetables.  · Foods with calcium and vitamin D - Babies, children, and adults need calcium and vitamin D to help keep their bones strong. Adults also need calcium and vitamin D to help prevent osteoporosis. Osteoporosis is a condition that causes bones to get "thin" and break more easily than usual. Different foods and drinks have calcium and vitamin " "D in them (figure 2). People who don't get enough calcium and vitamin D in their diet might need to take a supplement.  · Foods with protein - Protein helps your muscles stay strong. Healthy foods with a lot of protein include chicken, fish, eggs, beans, nuts, and soy products. Red meat also has a lot of protein, but it also contains fats, which can be unhealthy.  Some experts recommend a "Mediterranean diet." This involves eating a lot of fruits, vegetables, nuts, whole grains, and olive oil. It also includes some fish, poultry, and dairy products, but not a lot of red meat. Eating this way can help your overall health, and might even lower your risk of having a stroke.  What foods should I avoid or limit? -- To eat a healthy diet, there are some things you should avoid or limit. They include:   · Fats - There are different types of fats. Some types of fats are better for your body than others.  Trans fats are especially unhealthy. They are found in margarines, many fast foods, and some store-bought baked goods. Trans fats can raise your cholesterol level and your increase your chance of getting heart disease. You should avoid eating foods with these types of fats.  The type of "polyunsaturated" fats found in fish seems to be healthy and can reduce your chance of getting heart disease. Other polyunsaturated fats might also be good for your health. When you cook, it's best to use oils with healthier fats, such as olive oil and canola oil.  · Sugar - To have a healthy diet, it's important to limit or avoid sugar, sweets, and refined grains. Refined grains are found in white bread, white rice, most forms of pasta, and most packaged "snack" foods. Whole grains, such as whole-wheat bread and brown rice, have more fiber and are better for your health.  Avoiding sugar-sweetened beverages, like soda and sports drinks, can also help improve your health.  · Red meat - Studies have shown that eating a lot of red meat can " "increase your risk of certain health problems, including heart disease and cancer. You should limit the amount of red meat that you eat.  Can I drink alcohol as part of a healthy diet? -- People who drink a small amount of alcohol each day might have a lower chance of getting heart disease. But drinking alcohol can lead to problems. For example, it can raise a person's chances of getting liver disease and certain types of cancers. In women, even 1 drink a day can increase the risk of getting breast cancer.  Most doctors recommend that adult women not have more than 1 drink a day and that adult men not have more than 2 drinks a day. The limits are different because most women's bodies take longer to break down alcohol.  How many calories do I need each day? -- The number of calories you need each day depends on your weight, height, age, sex, and how active you are.  Your doctor or nurse can tell you how many calories you should eat each day. If you are trying to lose weight, you should eat fewer calories each day.  What if I have questions? -- If you have questions about which foods you should or should not eat, ask your doctor or nurse. The right diet for you will depend, in part, on your health and any medical conditions you have.  All topics are updated as new evidence becomes available and our peer review process is complete.  This topic retrieved from [employer] on: Sep 21, 2021.  Topic 71658 Version 20.0  Release: 29.4.2 - C29.263  © 2021 UpToDate, Inc. and/or its affiliates. All rights reserved.  figure 1: Nutrition label - fiber     This is an example of a nutrition label. To figure out how much fiber is in a food, look for the line that says "Dietary Fiber." It's also important to look at the serving size. This food has 7 grams of fiber in each serving, and each serving is 1 cup.  Graphic 05663 Version 7.0    figure 2: Foods and drinks with calcium and vitamin D     Foods rich in calcium include ice cream, soy " "milk, breads, kale, broccoli, milk, cheese, cottage cheese, almonds, yogurt, ready-to-eat cereals, beans, and tofu. Foods rich in vitamin D include milk, fortified plant-based "milks" (soy, almond), canned tuna fish, cod liver oil, yogurt, ready-to-eat-cereals, cooked salmon, canned sardines, mackerel, and eggs. Some of these foods are rich in both.  Graphic 24526 Version 4.0    Consumer Information Use and Disclaimer   This information is not specific medical advice and does not replace information you receive from your health care provider. This is only a brief summary of general information. It does NOT include all information about conditions, illnesses, injuries, tests, procedures, treatments, therapies, discharge instructions or life-style choices that may apply to you. You must talk with your health care provider for complete information about your health and treatment options. This information should not be used to decide whether or not to accept your health care provider's advice, instructions or recommendations. Only your health care provider has the knowledge and training to provide advice that is right for you. The use of this information is governed by the [employer] End User License Agreement, available at https://www.Bloglovin.[employer]/en/solutions/[employer]about/kentrell.The use of [employer] content is governed by the [employer] Terms of Use. ©2021 UpToDate, Inc. All rights reserved.  Copyright   © 2021 UpToDate, Inc. and/or its affiliates. All rights reserved.  Patient Education       Heart Healthy Diet   General   With a heart healthy food plan, you will learn to make better food choices. This diet may help you lower your blood cholesterol level, manage your blood pressure, and lower your risk for heart problems. Smaller portions may also be helpful.  Sodium is a type of mineral found in many foods. It helps keep the balance of fluids in your body. Too much sodium can raise your blood pressure. It can also " make you take on extra water. This is called edema. Pay careful attention to how much salt or sodium is in your food. You may need to avoid salt or eat foods with less sodium.  Cholesterol is a fat-like, waxy substance in your blood. It is normal to have some cholesterol in your blood because your body makes it. You also get extra cholesterol from all animal products. These are foods like meats, eggs, and dairy products. Too much cholesterol in your blood can block or damage your blood vessels. This can lead to a heart attack or stroke.  Fats in your food have calories which give energy. Not all fats are bad. Some fats are healthy, like the fat found in fish, nuts, and olive oil. These are called unsaturated fats. They help manage body functions and lower cholesterol levels. Learn about the best fats to use in your diet and where to use them. Eating too much fat may make you more likely to weigh more than is healthy. This raises your risk of many heart problems.  Fiber is found in plants. Meat and dairy products do not have fiber in them. Fiber can help you lower your unhealthy cholesterol level. You may need more water as you eat more fiber so you do not get hard stools.  What lifestyle changes are needed?   Eat a healthy diet and workout often. Try to use as many calories as you take in each day.  What changes to diet are needed?   · Eat oily fish at least 2 times a week. These are fish like tuna, salmon, and mackerel.  · Limit sodium to no more than 2,300 mg of sodium per day. This is about 1 teaspoon (5 grams) of table salt. Use little or no salt when making food. Try other spices or seasoning instead.  · Limit how much cholesterol you eat to less than 300 mg per day. You can do this by having lean meats. Also eat lots of fruits, vegetables, and fat-free and low-fat dairy products.  · Limit how much trans fats you eat. Trans fats are found in many processed foods like stick margarine, shortening, and some fried  foods. Also, lower how much hydrogenated fats you eat. They are used to make pastries, biscuits, cookies, crackers, chips, and many snack foods.  · Have no more than 1 drink per day of beer, wine, and mixed drinks (alcohol).  Who should use this diet?   A heart healthy diet is good for everyone.  What foods are good to eat?   · Grains: Try to eat 6 to 8 servings of whole grain, high fiber foods each day. These are whole grain bread, cereals, brown rice, or pasta.  · Fruits and vegetables: Eat 4 to 5 servings each day. Try to pick many kinds and colors. Try to eat more that are fresh or frozen. Look for low sodium or salt-free if you choose canned. Rinse canned items before cooking or eating. Dried peas, beans, and lentils are also good.  · Dairy: Choose low fat (1%) or fat-free milk. Eat nonfat or low-fat products.  · Protein: Try to eat more low fat or lean meats like chicken and turkey. Eat less red meat and eat more fish, eggs, egg whites, and beans instead.  · Fats: Use good fats found in fish, nuts, and avocados. Try using olive oil, canola oil, and low-sodium and low-fat salad dressing and mayonnaise. Use corn, safflower, sunflower, and soybean oils.  · Condiments: Use low-sodium or salt-free broths, soups, soy sauce, and condiments. Pepper, herbs, spices, vinegar, lemon or lime juices are great for seasoning. Sugar, cocoa powder, honey, syrup, and jams may be eaten in small amounts.  · Sweets: Low-fat, trans fat-free cookies, cakes, and pies; jose crackers; animal crackers; low-fat fig bars; and alcides snaps.     What foods should be limited or avoided?   · Grains: Salted breads, rolls, crackers, quick breads, self-rising flours, biscuit mixes, regular bread crumbs, instant hot cereals, commercially-prepared rice, pasta, stuffing mixes  · Fruits and vegetables: Commercially-prepared potatoes and vegetable mixes, regular canned vegetables and juices, vegetables frozen with sauce or pickled vegetables,  processed fruits with added sugar or salt  · Dairy: Whole milk, malted milk, chocolate milk, buttermilk  · Protein: Smoked, cured, salted, or canned meat, fish, or poultry such as jovel and sausages  · Fats: Cut back on solid fats like butter, lard, and margarine.  · Condiments and snacks: Salted and canned peas, beans, and olives; salted snack foods; fried foods; soda, juices, or other sweetened drinks; commercially-softened water. Miso, salsa, ketchup, barbecue sauce, Worcestershire sauce, soy sauce, and teriyaki sauce are also high in salt.  · Sweets: High-fat baked goods such as muffins, donuts, pastries, commercial baked goods  Helpful tips   · When you go to a grocery store, have a list or a meal plan. Do not shop when you are hungry to avoid cravings for foods.  · You need to know about the sodium and fat content of the food you eat. Read food labels with care. They will show you how much of each is in a serving. This amount is given as a percentage of the total amount you need each day. Reading the labels will help you make healthy food choices.     · Avoid fast foods.  · Watch your portions when eating out. Split an order or bring home half for another meal.     · Talk to a dietitian for help.  Where can I learn more?   American Academy of Family Physicians  https://familydoctor.org/diet-and-exercise-for-a-healthy-heart/   American Heart Association  https://www.heart.org/en/healthy-living/healthy-eating/eat-smart/nutrition-basics/aha-diet-and-lifestyle-recommendations   NHS  https://www.nhs.uk/live-well/eat-well/   Last Reviewed Date   2020-03-27  Consumer Information Use and Disclaimer   This information is not specific medical advice and does not replace information you receive from your health care provider. This is only a brief summary of general information. It does NOT include all information about conditions, illnesses, injuries, tests, procedures, treatments, therapies, discharge instructions or  "life-style choices that may apply to you. You must talk with your health care provider for complete information about your health and treatment options. This information should not be used to decide whether or not to accept your health care providers advice, instructions or recommendations. Only your health care provider has the knowledge and training to provide advice that is right for you.  Copyright   Copyright © 2021 UpToDate, Inc. and its affiliates and/or licensors. All rights reserved.  Patient Education       Low Salt Diet   About this topic   Sodium is a type of mineral found in many foods. It may also be called "salt." Sodium helps balance fluids in your body. Too much sodium may be bad for your health. You may have to limit the amount of sodium in your food.  Salt is known as sodium chloride. It is measured in grams (g) or milligrams (mg). Salt or sodium in our diet comes from 3 main sources:  · Some sodium is naturally found in food.  · We may add salt to our food when we eat or cook.  · Processed foods give us most of the sodium in our diet.         What will the results be?   This diet may help lower your blood pressure. It may also help reduce extra water in your body. This may help kidney, heart, or liver problems.  What changes to diet are needed?   You need to know how much sodium is in the food you eat. Read food labels with care. Choose foods that have 5% or less sodium in one serving. Remember, if you eat more than one serving, you will be getting more sodium. It may take a while for your sense of taste to get used to food with less sodium. Be patient with yourself. You may be surprised at how well you will do.  Try to aim for a diet that has 2,300 mg (2.3 g) or less sodium in it each day. The Food & Drug Administration (FDA) has set up guidelines for food labels. These will help you make healthy choices. Look for these terms on food packages:  · Sodium-free: Less than 5 mg in each serving. " These are safe to eat.  · Very low sodium: 35 mg of sodium or less in each serving. These are safe to eat.  · Low sodium: 140 mg of sodium or less in each serving. You need to eat these with care.  · Reduced sodium: At least 25% less sodium than is most often found in each serving. These foods can still be high in sodium.  · Light in sodium: 50% less sodium in each serving.  · Unsalted, no added salt, and without added salt: No salt is added during processing, but the food may still have sodium. Read the food label and check the sodium content before eating.  What foods are good to eat?   You can control the amount of sodium in foods you make at home. Fresh foods that you cook are most often lower in sodium.  · Regular bread, unsalted crackers, dry cereal, cooked rice, pasta, quinoa, and corn tortillas.  · Fresh, frozen, low sodium, or salt-free canned vegetables. Limit vegetable juice or tomato juice to 1/2 cup (120 mL) each day.  · Fresh, frozen, canned, or dried fruit without salt added  · Nonfat or low-fat milk and yogurt, low-sodium cottage cheese, and other cheeses low in sodium.  · Fresh or frozen beef, veal, lamb, pork, poultry, fish, shellfish  · Low sodium canned meats, frozen dinners with less than 600 mg sodium  · Corn, safflower, sunflower, and soybean oils and unsalted nuts and seeds  · Egg and egg substitutes without added sodium  · Dried peas, beans, and low-sodium peanut butter  · All plain oils and low-sodium salad dressing  · Low-sodium broths, soups, soy sauce, condiments, and snack foods  · Pepper, herbs, spices, vinegar, lemon or lime juice  · Low-sodium carbonated drinks  What foods should be limited or avoided?   Foods that are prepackaged or canned are most often high in sodium. Foods to limit or avoid include:  · Salted breads, rolls, crackers, biscuits, cornbread  · Quick breads, self-rising flours, biscuit mixes, regular bread crumbs, instant hot cereals  · Commercially prepared rice,  pasta, or stuffing mixes; potatoes and vegetable mixes  · Regular canned vegetables and juices, vegetables with sauce, and pickled vegetables  · Frozen vegetables with seasonings and sauces  · Processed fruits with salt or sodium  · Malted and chocolate milk and buttermilk  · Regular and processed cheese and spreads  · Smoked, cured, salted, or canned meat, fish, or poultry such as jovel, sausages, sardines, chipped beef, hot dogs, cold cuts, and frozen breaded meats  · Salted and canned peas, beans, and olives  · Salted snack foods and nuts  · Oils mixed with high-sodium parts such as salad dressing  · Meat tenderizers, seasoning salt, and most flavored vinegars  · Ketchup, bouillon cubes, salt, sea salt, kosher salt, onion salt, garlic salt, and pink Himalayan salt  What can be done to prevent this health problem?   Check with your doctor before using salt substitutes. Also, check the labels when taking over-the-counter (OTC) drugs such as laxatives and antacids. These can have a high sodium content.  When do I need to call the doctor?   Call your doctor if you have questions about this diet. Talk to a dietitian. They can help you find hidden sources of sodium in the food you eat.  Helpful tips   · Use the nutrition facts labels as a guide to look for foods lower in sodium.  · Do not use salt when eating or cooking.  · Select fresh fruits and vegetables for snacks.  · If you are eating out, ask the  to cook your food without salt, or choose foods without sauces.  · Season your food with herbs and spices.  · Drain and rinse canned beans and vegetables that contain sodium.  · Eat foods with potassium. Potassium helps counter the effects of sodium. This may help lower your blood pressure. Foods high in potassium include: Potatoes, tomatoes, bananas, oranges, cantaloupe, beans, and greens.  Where can I learn more?   American Heart  Association  https://www.heart.org/en/healthy-living/healthy-eating/eat-smart/sodium/how-to-reduce-sodium   American Heart Association  https://www.heart.org/en/healthy-living/healthy-eating/eat-smart/nutrition-basics/food-packaging-claims   [facility]  https://www.nhs.uk/live-well/eat-well/tips-for-a-lower-salt-diet/   UpToDate  http://www.[employer]/contents/low-sodium-diet-beyond-the-basics   Last Reviewed Date   2021-10-11  Consumer Information Use and Disclaimer   This information is not specific medical advice and does not replace information you receive from your health care provider. This is only a brief summary of general information. It does NOT include all information about conditions, illnesses, injuries, tests, procedures, treatments, therapies, discharge instructions or life-style choices that may apply to you. You must talk with your health care provider for complete information about your health and treatment options. This information should not be used to decide whether or not to accept your health care providers advice, instructions or recommendations. Only your health care provider has the knowledge and training to provide advice that is right for you.  Copyright   Copyright © 2021 UpToDate, Inc. and its affiliates and/or licensors. All rights reserved.  Patient Education       Diet and Health   The Basics   Written by the doctors and editors at [employer]   Why is it important to eat a healthy diet? -- It's important to eat a healthy diet because eating the right foods can keep you healthy now and later on in life.  Which foods are especially healthy? -- Foods that are especially healthy include:  · Fruits and vegetables - Eating a diet with lots of fruits and vegetables can help prevent heart disease and strokes. It might also help prevent certain types of cancers. Try to eat fruits and vegetables at each meal and also for snacks. If you don't have fresh fruits and vegetables available, you can eat  "frozen or canned ones instead. Doctors recommend eating at least 2 1/2 servings of vegetables and 2 servings of fruits each day.  · Foods with fiber - Eating foods with a lot of fiber can help prevent heart disease and strokes. If you have type 2 diabetes, it can also help control your blood sugar. Foods that have a lot of fiber include vegetables, fruits, beans, nuts, oatmeal, and whole grain breads and cereals. You can tell how much fiber is in a food by reading the nutrition label (figure 1). Doctors recommend eating 25 to 36 grams of fiber each day.  · Foods with folate (also called folic acid) - Folate is a vitamin that is important for pregnant people, since it helps prevent certain birth defects. Anyone who could get pregnant should get at least 400 micrograms of folic acid daily, whether or not they are actively trying to get pregnant. Folate is found in many breakfast cereals, oranges, orange juice, and green leafy vegetables.  · Foods with calcium and vitamin D - Babies, children, and adults need calcium and vitamin D to help keep their bones strong. Adults also need calcium and vitamin D to help prevent osteoporosis. Osteoporosis is a condition that causes bones to get "thin" and break more easily than usual. Different foods and drinks have calcium and vitamin D in them (figure 2). People who don't get enough calcium and vitamin D in their diet might need to take a supplement.  · Foods with protein - Protein helps your muscles stay strong. Healthy foods with a lot of protein include chicken, fish, eggs, beans, nuts, and soy products. Red meat also has a lot of protein, but it also contains fats, which can be unhealthy.  Some experts recommend a "Mediterranean diet." This involves eating a lot of fruits, vegetables, nuts, whole grains, and olive oil. It also includes some fish, poultry, and dairy products, but not a lot of red meat. Eating this way can help your overall health, and might even lower your " "risk of having a stroke.  What foods should I avoid or limit? -- To eat a healthy diet, there are some things you should avoid or limit. They include:   · Fats - There are different types of fats. Some types of fats are better for your body than others.  Trans fats are especially unhealthy. They are found in margarines, many fast foods, and some store-bought baked goods. Trans fats can raise your cholesterol level and your increase your chance of getting heart disease. You should avoid eating foods with these types of fats.  The type of "polyunsaturated" fats found in fish seems to be healthy and can reduce your chance of getting heart disease. Other polyunsaturated fats might also be good for your health. When you cook, it's best to use oils with healthier fats, such as olive oil and canola oil.  · Sugar - To have a healthy diet, it's important to limit or avoid sugar, sweets, and refined grains. Refined grains are found in white bread, white rice, most forms of pasta, and most packaged "snack" foods. Whole grains, such as whole-wheat bread and brown rice, have more fiber and are better for your health.  Avoiding sugar-sweetened beverages, like soda and sports drinks, can also help improve your health.  · Red meat - Studies have shown that eating a lot of red meat can increase your risk of certain health problems, including heart disease and cancer. You should limit the amount of red meat that you eat.  Can I drink alcohol as part of a healthy diet? -- People who drink a small amount of alcohol each day might have a lower chance of getting heart disease. But drinking alcohol can lead to problems. For example, it can raise a person's chances of getting liver disease and certain types of cancers. In women, even 1 drink a day can increase the risk of getting breast cancer.  Most doctors recommend that adult women not have more than 1 drink a day and that adult men not have more than 2 drinks a day. The limits are " "different because most women's bodies take longer to break down alcohol.  How many calories do I need each day? -- The number of calories you need each day depends on your weight, height, age, sex, and how active you are.  Your doctor or nurse can tell you how many calories you should eat each day. If you are trying to lose weight, you should eat fewer calories each day.  What if I have questions? -- If you have questions about which foods you should or should not eat, ask your doctor or nurse. The right diet for you will depend, in part, on your health and any medical conditions you have.  All topics are updated as new evidence becomes available and our peer review process is complete.  This topic retrieved from [employer] on: Sep 21, 2021.  Topic 47236 Version 20.0  Release: 29.4.2 - C29.263  © 2021 UpToDate, Inc. and/or its affiliates. All rights reserved.  figure 1: Nutrition label - fiber     This is an example of a nutrition label. To figure out how much fiber is in a food, look for the line that says "Dietary Fiber." It's also important to look at the serving size. This food has 7 grams of fiber in each serving, and each serving is 1 cup.  Graphic 54566 Version 7.0    figure 2: Foods and drinks with calcium and vitamin D     Foods rich in calcium include ice cream, soy milk, breads, kale, broccoli, milk, cheese, cottage cheese, almonds, yogurt, ready-to-eat cereals, beans, and tofu. Foods rich in vitamin D include milk, fortified plant-based "milks" (soy, almond), canned tuna fish, cod liver oil, yogurt, ready-to-eat-cereals, cooked salmon, canned sardines, mackerel, and eggs. Some of these foods are rich in both.  Graphic 61456 Version 4.0    Consumer Information Use and Disclaimer   This information is not specific medical advice and does not replace information you receive from your health care provider. This is only a brief summary of general information. It does NOT include all information about " conditions, illnesses, injuries, tests, procedures, treatments, therapies, discharge instructions or life-style choices that may apply to you. You must talk with your health care provider for complete information about your health and treatment options. This information should not be used to decide whether or not to accept your health care provider's advice, instructions or recommendations. Only your health care provider has the knowledge and training to provide advice that is right for you. The use of this information is governed by the [employer] End User License Agreement, available at https://www.[employer]/en/solutions/5minutes/about/kentrell.The use of "2,10E+07" content is governed by the "2,10E+07" Terms of Use. ©2021 UpToDate, Inc. All rights reserved.  Copyright   © 2021 UpToDate, Inc. and/or its affiliates. All rights reserved.  Patient Education       Heart Healthy Diet   General   With a heart healthy food plan, you will learn to make better food choices. This diet may help you lower your blood cholesterol level, manage your blood pressure, and lower your risk for heart problems. Smaller portions may also be helpful.  Sodium is a type of mineral found in many foods. It helps keep the balance of fluids in your body. Too much sodium can raise your blood pressure. It can also make you take on extra water. This is called edema. Pay careful attention to how much salt or sodium is in your food. You may need to avoid salt or eat foods with less sodium.  Cholesterol is a fat-like, waxy substance in your blood. It is normal to have some cholesterol in your blood because your body makes it. You also get extra cholesterol from all animal products. These are foods like meats, eggs, and dairy products. Too much cholesterol in your blood can block or damage your blood vessels. This can lead to a heart attack or stroke.  Fats in your food have calories which give energy. Not all fats are bad. Some fats are healthy, like  the fat found in fish, nuts, and olive oil. These are called unsaturated fats. They help manage body functions and lower cholesterol levels. Learn about the best fats to use in your diet and where to use them. Eating too much fat may make you more likely to weigh more than is healthy. This raises your risk of many heart problems.  Fiber is found in plants. Meat and dairy products do not have fiber in them. Fiber can help you lower your unhealthy cholesterol level. You may need more water as you eat more fiber so you do not get hard stools.  What lifestyle changes are needed?   Eat a healthy diet and workout often. Try to use as many calories as you take in each day.  What changes to diet are needed?   · Eat oily fish at least 2 times a week. These are fish like tuna, salmon, and mackerel.  · Limit sodium to no more than 2,300 mg of sodium per day. This is about 1 teaspoon (5 grams) of table salt. Use little or no salt when making food. Try other spices or seasoning instead.  · Limit how much cholesterol you eat to less than 300 mg per day. You can do this by having lean meats. Also eat lots of fruits, vegetables, and fat-free and low-fat dairy products.  · Limit how much trans fats you eat. Trans fats are found in many processed foods like stick margarine, shortening, and some fried foods. Also, lower how much hydrogenated fats you eat. They are used to make pastries, biscuits, cookies, crackers, chips, and many snack foods.  · Have no more than 1 drink per day of beer, wine, and mixed drinks (alcohol).  Who should use this diet?   A heart healthy diet is good for everyone.  What foods are good to eat?   · Grains: Try to eat 6 to 8 servings of whole grain, high fiber foods each day. These are whole grain bread, cereals, brown rice, or pasta.  · Fruits and vegetables: Eat 4 to 5 servings each day. Try to pick many kinds and colors. Try to eat more that are fresh or frozen. Look for low sodium or salt-free if you  choose canned. Rinse canned items before cooking or eating. Dried peas, beans, and lentils are also good.  · Dairy: Choose low fat (1%) or fat-free milk. Eat nonfat or low-fat products.  · Protein: Try to eat more low fat or lean meats like chicken and turkey. Eat less red meat and eat more fish, eggs, egg whites, and beans instead.  · Fats: Use good fats found in fish, nuts, and avocados. Try using olive oil, canola oil, and low-sodium and low-fat salad dressing and mayonnaise. Use corn, safflower, sunflower, and soybean oils.  · Condiments: Use low-sodium or salt-free broths, soups, soy sauce, and condiments. Pepper, herbs, spices, vinegar, lemon or lime juices are great for seasoning. Sugar, cocoa powder, honey, syrup, and jams may be eaten in small amounts.  · Sweets: Low-fat, trans fat-free cookies, cakes, and pies; jose crackers; animal crackers; low-fat fig bars; and alcides snaps.     What foods should be limited or avoided?   · Grains: Salted breads, rolls, crackers, quick breads, self-rising flours, biscuit mixes, regular bread crumbs, instant hot cereals, commercially-prepared rice, pasta, stuffing mixes  · Fruits and vegetables: Commercially-prepared potatoes and vegetable mixes, regular canned vegetables and juices, vegetables frozen with sauce or pickled vegetables, processed fruits with added sugar or salt  · Dairy: Whole milk, malted milk, chocolate milk, buttermilk  · Protein: Smoked, cured, salted, or canned meat, fish, or poultry such as jovel and sausages  · Fats: Cut back on solid fats like butter, lard, and margarine.  · Condiments and snacks: Salted and canned peas, beans, and olives; salted snack foods; fried foods; soda, juices, or other sweetened drinks; commercially-softened water. Miso, salsa, ketchup, barbecue sauce, Worcestershire sauce, soy sauce, and teriyaki sauce are also high in salt.  · Sweets: High-fat baked goods such as muffins, donuts, pastries, commercial baked  goods  Helpful tips   · When you go to a grocery store, have a list or a meal plan. Do not shop when you are hungry to avoid cravings for foods.  · You need to know about the sodium and fat content of the food you eat. Read food labels with care. They will show you how much of each is in a serving. This amount is given as a percentage of the total amount you need each day. Reading the labels will help you make healthy food choices.     · Avoid fast foods.  · Watch your portions when eating out. Split an order or bring home half for another meal.     · Talk to a dietitian for help.  Where can I learn more?   American Academy of Family Physicians  https://familydoctor.org/diet-and-exercise-for-a-healthy-heart/   American Heart Association  https://www.heart.org/en/healthy-living/healthy-eating/eat-smart/nutrition-basics/aha-diet-and-lifestyle-recommendations   [facility]  https://www.nhs.uk/live-well/eat-well/   Last Reviewed Date   2020-03-27  Consumer Information Use and Disclaimer   This information is not specific medical advice and does not replace information you receive from your health care provider. This is only a brief summary of general information. It does NOT include all information about conditions, illnesses, injuries, tests, procedures, treatments, therapies, discharge instructions or life-style choices that may apply to you. You must talk with your health care provider for complete information about your health and treatment options. This information should not be used to decide whether or not to accept your health care providers advice, instructions or recommendations. Only your health care provider has the knowledge and training to provide advice that is right for you.  Copyright   Copyright © 2021 UpToDate, Inc. and its affiliates and/or licensors. All rights reserved.  Patient Education       Low Salt Diet   About this topic   Sodium is a type of mineral found in many foods. It may also be called  ""salt." Sodium helps balance fluids in your body. Too much sodium may be bad for your health. You may have to limit the amount of sodium in your food.  Salt is known as sodium chloride. It is measured in grams (g) or milligrams (mg). Salt or sodium in our diet comes from 3 main sources:  · Some sodium is naturally found in food.  · We may add salt to our food when we eat or cook.  · Processed foods give us most of the sodium in our diet.         What will the results be?   This diet may help lower your blood pressure. It may also help reduce extra water in your body. This may help kidney, heart, or liver problems.  What changes to diet are needed?   You need to know how much sodium is in the food you eat. Read food labels with care. Choose foods that have 5% or less sodium in one serving. Remember, if you eat more than one serving, you will be getting more sodium. It may take a while for your sense of taste to get used to food with less sodium. Be patient with yourself. You may be surprised at how well you will do.  Try to aim for a diet that has 2,300 mg (2.3 g) or less sodium in it each day. The Food & Drug Administration (FDA) has set up guidelines for food labels. These will help you make healthy choices. Look for these terms on food packages:  · Sodium-free: Less than 5 mg in each serving. These are safe to eat.  · Very low sodium: 35 mg of sodium or less in each serving. These are safe to eat.  · Low sodium: 140 mg of sodium or less in each serving. You need to eat these with care.  · Reduced sodium: At least 25% less sodium than is most often found in each serving. These foods can still be high in sodium.  · Light in sodium: 50% less sodium in each serving.  · Unsalted, no added salt, and without added salt: No salt is added during processing, but the food may still have sodium. Read the food label and check the sodium content before eating.  What foods are good to eat?   You can control the amount of sodium " in foods you make at home. Fresh foods that you cook are most often lower in sodium.  · Regular bread, unsalted crackers, dry cereal, cooked rice, pasta, quinoa, and corn tortillas.  · Fresh, frozen, low sodium, or salt-free canned vegetables. Limit vegetable juice or tomato juice to 1/2 cup (120 mL) each day.  · Fresh, frozen, canned, or dried fruit without salt added  · Nonfat or low-fat milk and yogurt, low-sodium cottage cheese, and other cheeses low in sodium.  · Fresh or frozen beef, veal, lamb, pork, poultry, fish, shellfish  · Low sodium canned meats, frozen dinners with less than 600 mg sodium  · Corn, safflower, sunflower, and soybean oils and unsalted nuts and seeds  · Egg and egg substitutes without added sodium  · Dried peas, beans, and low-sodium peanut butter  · All plain oils and low-sodium salad dressing  · Low-sodium broths, soups, soy sauce, condiments, and snack foods  · Pepper, herbs, spices, vinegar, lemon or lime juice  · Low-sodium carbonated drinks  What foods should be limited or avoided?   Foods that are prepackaged or canned are most often high in sodium. Foods to limit or avoid include:  · Salted breads, rolls, crackers, biscuits, cornbread  · Quick breads, self-rising flours, biscuit mixes, regular bread crumbs, instant hot cereals  · Commercially prepared rice, pasta, or stuffing mixes; potatoes and vegetable mixes  · Regular canned vegetables and juices, vegetables with sauce, and pickled vegetables  · Frozen vegetables with seasonings and sauces  · Processed fruits with salt or sodium  · Malted and chocolate milk and buttermilk  · Regular and processed cheese and spreads  · Smoked, cured, salted, or canned meat, fish, or poultry such as jovel, sausages, sardines, chipped beef, hot dogs, cold cuts, and frozen breaded meats  · Salted and canned peas, beans, and olives  · Salted snack foods and nuts  · Oils mixed with high-sodium parts such as salad dressing  · Meat tenderizers,  seasoning salt, and most flavored vinegars  · Ketchup, bouillon cubes, salt, sea salt, kosher salt, onion salt, garlic salt, and pink Himalayan salt  What can be done to prevent this health problem?   Check with your doctor before using salt substitutes. Also, check the labels when taking over-the-counter (OTC) drugs such as laxatives and antacids. These can have a high sodium content.  When do I need to call the doctor?   Call your doctor if you have questions about this diet. Talk to a dietitian. They can help you find hidden sources of sodium in the food you eat.  Helpful tips   · Use the nutrition facts labels as a guide to look for foods lower in sodium.  · Do not use salt when eating or cooking.  · Select fresh fruits and vegetables for snacks.  · If you are eating out, ask the  to cook your food without salt, or choose foods without sauces.  · Season your food with herbs and spices.  · Drain and rinse canned beans and vegetables that contain sodium.  · Eat foods with potassium. Potassium helps counter the effects of sodium. This may help lower your blood pressure. Foods high in potassium include: Potatoes, tomatoes, bananas, oranges, cantaloupe, beans, and greens.  Where can I learn more?   American Heart Association  https://www.heart.org/en/healthy-living/healthy-eating/eat-smart/sodium/how-to-reduce-sodium   American Heart Association  https://www.heart.org/en/healthy-living/healthy-eating/eat-smart/nutrition-basics/food-packaging-claims   NHS  https://www.nhs.uk/live-well/eat-well/tips-for-a-lower-salt-diet/   [employer]  http://www.ICEXdaFeebbo.com/contents/low-sodium-diet-beyond-the-basics   Last Reviewed Date   2021-10-11  Consumer Information Use and Disclaimer   This information is not specific medical advice and does not replace information you receive from your health care provider. This is only a brief summary of general information. It does NOT include all information about conditions, illnesses,  injuries, tests, procedures, treatments, therapies, discharge instructions or life-style choices that may apply to you. You must talk with your health care provider for complete information about your health and treatment options. This information should not be used to decide whether or not to accept your health care providers advice, instructions or recommendations. Only your health care provider has the knowledge and training to provide advice that is right for you.  Copyright   Copyright © 2021 UpToDate, Inc. and its affiliates and/or licensors. All rights reserved.

## 2022-01-31 NOTE — ASSESSMENT & PLAN NOTE
Pt notes episodes of palpitations a/w presyncope and sob.  She has not have episodes for the past week and denies changes in stress.    - check echo to eval cardiac function   - check holter to eval for arrhythmia

## 2022-01-31 NOTE — PROGRESS NOTES
Subjective:    Patient ID:  Alejandrina Garrison is a 56 y.o. female who presents for evaluation of No chief complaint on file.      PCP/Referring: Chapis Martinez MD     HPI  Pt is a 55 yo F w/ PMH of HTN and Class 2 Obesity w/ BMI 37 who presents for evaluation of palpitations x1 wks.  She notes frequent episodes of palpitations a/w presyncope at rest x1 week and then resolved.  She also notes roy x6 months and is unsure of the cause and also notes weight gain since the pandemic.  She denies cp, edema, orthopnea, PND, LOC, and claudication.  She monitors her BP at home w/ the digital medicine prgm and running 130s/70s.  She has been exercising doing cardio at the gym however notes some roy.         Past Medical History:   Diagnosis Date    Hypertension      Past Surgical History:   Procedure Laterality Date     SECTION  1988    x3, 1998, ,     DILATION AND CURETTAGE OF UTERUS          right tubal       SALPINGOOPHORECTOMY      left    TUBAL LIGATION  2008     Social History     Socioeconomic History    Marital status:    Tobacco Use    Smoking status: Never Smoker    Smokeless tobacco: Never Used   Substance and Sexual Activity    Alcohol use: Yes     Alcohol/week: 1.0 standard drink     Types: 1 Glasses of wine per week     Comment: Social drink, glass of wine if eating out    Drug use: Never    Sexual activity: Yes     Partners: Male     Birth control/protection: None     Family History   Problem Relation Age of Onset    Hypertension Mother     Osteoporosis Mother     Cancer Father     Leukemia Father     Hypertension Sister     Colon cancer Maternal Grandmother     Arthritis Paternal Grandmother     Breast cancer Neg Hx     Ovarian cancer Neg Hx        Review of patient's allergies indicates:   Allergen Reactions    Morphine Anaphylaxis, Hives, Itching and Shortness Of Breath    Compazine [prochlorperazine]        Medication List with Changes/Refills  "  Current Medications    OLMESARTAN-HYDROCHLOROTHIAZIDE (BENICAR HCT) 40-25 MG PER TABLET    Take 1 tablet by mouth once daily.       Review of Systems   Constitutional: Negative for diaphoresis and fever.   HENT: Negative for congestion and hearing loss.    Eyes: Negative for blurred vision and pain.   Cardiovascular: Positive for dyspnea on exertion, near-syncope and palpitations. Negative for chest pain, claudication, leg swelling and syncope.   Respiratory: Negative for shortness of breath and sleep disturbances due to breathing.    Hematologic/Lymphatic: Negative for bleeding problem. Does not bruise/bleed easily.   Skin: Negative for color change and poor wound healing.   Gastrointestinal: Negative for abdominal pain and nausea.   Genitourinary: Negative for bladder incontinence and flank pain.   Neurological: Negative for focal weakness and light-headedness.        Objective:   /72   Pulse 79   Ht 5' 7" (1.702 m)   Wt 108.4 kg (239 lb)   LMP  (LMP Unknown)   SpO2 100%   BMI 37.43 kg/m²    Physical Exam  Constitutional:       Appearance: She is well-developed and well-nourished. She is obese. She is not diaphoretic.   HENT:      Head: Normocephalic and atraumatic.      Mouth/Throat:      Mouth: Oropharynx is clear and moist.   Eyes:      General: No scleral icterus.     Extraocular Movements: EOM normal.      Pupils: Pupils are equal, round, and reactive to light.   Neck:      Vascular: No JVD.   Cardiovascular:      Rate and Rhythm: Normal rate and regular rhythm.      Pulses: Intact distal pulses.      Heart sounds: S1 normal and S2 normal. No murmur heard.  No friction rub. No gallop.    Pulmonary:      Effort: Pulmonary effort is normal. No respiratory distress.      Breath sounds: Normal breath sounds. No wheezing or rales.   Chest:      Chest wall: No tenderness.   Abdominal:      General: Bowel sounds are normal. There is no distension.      Palpations: Abdomen is soft. There is no mass.     "  Tenderness: There is no abdominal tenderness. There is no rebound.   Musculoskeletal:         General: No tenderness or edema. Normal range of motion.      Cervical back: Normal range of motion and neck supple.   Skin:     General: Skin is warm and dry.      Coloration: Skin is not pale.   Neurological:      Mental Status: She is alert and oriented to person, place, and time.      Coordination: Coordination normal.      Deep Tendon Reflexes: Reflexes normal.   Psychiatric:         Mood and Affect: Mood and affect normal.         Behavior: Behavior normal.         Judgment: Judgment normal.           EC2022- reviewed.  NSR, NL ECG.      Assessment:       1. Essential hypertension    2. Class 2 obesity due to excess calories without serious comorbidity with body mass index (BMI) of 37.0 to 37.9 in adult    3. Palpitations    4. SOBOE (shortness of breath on exertion)         Plan:         Essential hypertension  Goal BP < 130/80.  Compliant w/ meds.   - continue current therapy for now  - risk factor and lifestyle modifications   - continue digital medicine prgm    Class 2 obesity due to excess calories without serious comorbidity with body mass index (BMI) of 37.0 to 37.9 in adult  Encouraged lifestyle modifications (diet, exercise, and weight loss).      Palpitations  Pt notes episodes of palpitations a/w presyncope and sob.  She has not have episodes for the past week and denies changes in stress.    - check echo to eval cardiac function   - check holter to eval for arrhythmia     SOBOE (shortness of breath on exertion)  Pt notes roy which may be related to habitus given weight gain however pt w/ risk factors for CAD.    - check ETT to eval for ischemia   - check echo to eval cardiac function       Total duration of face to face visit time 30 minutes.  Total time spent counseling greater than fifty percent of total visit time.  Counseling included discussion regarding imaging findings, diagnosis,  possibilities, treatment options, risks and benefits.  The patient had many questions regarding the options and long-term effects      Homer Reyes M.D.  Interventional Cardiology

## 2022-01-31 NOTE — ASSESSMENT & PLAN NOTE
Goal BP < 130/80.  Compliant w/ meds.   - continue current therapy for now  - risk factor and lifestyle modifications   - continue digital medicine prgm

## 2022-02-01 ENCOUNTER — TELEPHONE (OUTPATIENT)
Dept: GASTROENTEROLOGY | Facility: CLINIC | Age: 57
End: 2022-02-01
Payer: COMMERCIAL

## 2022-02-01 NOTE — TELEPHONE ENCOUNTER
Spoke to patient she wanted to know if we had available on 02/11/ or 02/18/ to change her procedure we didn't available so she stated she will stay with the day she is already schedule to have her procedure verbal understanding.

## 2022-02-01 NOTE — TELEPHONE ENCOUNTER
----- Message from Onur Stevens sent at 2/1/2022 11:54 AM CST -----  Type:  Patient Returning Call    Who Called:Pt  Would the patient rather a call back or a response via [facility]? Call   Best Call Back Number:[phone]  Additional Information:     Pt would like to reschedule procedure to 02/11 or 02/18  Pt would like a call back

## 2022-02-02 DIAGNOSIS — Z01.818 PRE-OP TESTING: ICD-10-CM

## 2022-02-21 ENCOUNTER — TELEPHONE (OUTPATIENT)
Dept: GASTROENTEROLOGY | Facility: CLINIC | Age: 57
End: 2022-02-21
Payer: COMMERCIAL

## 2022-02-21 ENCOUNTER — PATIENT MESSAGE (OUTPATIENT)
Dept: INTERNAL MEDICINE | Facility: CLINIC | Age: 57
End: 2022-02-21
Payer: COMMERCIAL

## 2022-02-21 DIAGNOSIS — Z12.11 SCREENING FOR MALIGNANT NEOPLASM OF COLON: Primary | ICD-10-CM

## 2022-02-21 NOTE — TELEPHONE ENCOUNTER
Patient will only have a  home in [place]. Patient will reschedule to GI in [place]. Order canceled at [place] location. Patient stated that he PCP has placed a referral to GI in [place] to schedule a colonoscopy.

## 2022-02-21 NOTE — TELEPHONE ENCOUNTER
----- Message from Mary Ann Lemus sent at 2/21/2022  9:08 AM CST -----  Contact: patient/  [phone]  Patient calling to speak with you regarding canceling procedure  Please advise

## 2022-03-11 PROBLEM — I10 PRIMARY HYPERTENSION: Status: ACTIVE | Noted: 2022-03-11

## 2022-03-18 ENCOUNTER — TELEPHONE (OUTPATIENT)
Dept: CARDIOLOGY | Facility: CLINIC | Age: 57
End: 2022-03-18
Payer: COMMERCIAL

## 2022-03-18 NOTE — TELEPHONE ENCOUNTER
Called pt to follow up in regards to virtual visit appt on 04-28-22 at 3:40 pm    Pt did not answer but left detailed voice message as well as call back number    ND

## 2022-03-31 ENCOUNTER — PATIENT MESSAGE (OUTPATIENT)
Dept: GASTROENTEROLOGY | Facility: CLINIC | Age: 57
End: 2022-03-31
Payer: COMMERCIAL

## 2022-07-11 ENCOUNTER — PATIENT MESSAGE (OUTPATIENT)
Dept: DERMATOLOGY | Facility: CLINIC | Age: 57
End: 2022-07-11

## 2022-07-11 ENCOUNTER — OFFICE VISIT (OUTPATIENT)
Dept: DERMATOLOGY | Facility: CLINIC | Age: 57
End: 2022-07-11
Payer: COMMERCIAL

## 2022-07-11 ENCOUNTER — LAB VISIT (OUTPATIENT)
Dept: LAB | Facility: HOSPITAL | Age: 57
End: 2022-07-11
Attending: DERMATOLOGY
Payer: COMMERCIAL

## 2022-07-11 DIAGNOSIS — Z76.89 ENCOUNTER FOR SKIN CARE: ICD-10-CM

## 2022-07-11 DIAGNOSIS — R79.89 LOW VITAMIN D LEVEL: ICD-10-CM

## 2022-07-11 DIAGNOSIS — L65.9 HAIR LOSS: ICD-10-CM

## 2022-07-11 DIAGNOSIS — L30.9 HAND ECZEMA: Primary | ICD-10-CM

## 2022-07-11 PROCEDURE — 1159F PR MEDICATION LIST DOCUMENTED IN MEDICAL RECORD: ICD-10-PCS | Mod: CPTII,S$GLB,, | Performed by: DERMATOLOGY

## 2022-07-11 PROCEDURE — 84466 ASSAY OF TRANSFERRIN: CPT | Performed by: DERMATOLOGY

## 2022-07-11 PROCEDURE — 99204 PR OFFICE/OUTPT VISIT, NEW, LEVL IV, 45-59 MIN: ICD-10-PCS | Mod: S$GLB,,, | Performed by: DERMATOLOGY

## 2022-07-11 PROCEDURE — 36415 COLL VENOUS BLD VENIPUNCTURE: CPT | Mod: PN | Performed by: DERMATOLOGY

## 2022-07-11 PROCEDURE — 82728 ASSAY OF FERRITIN: CPT | Performed by: DERMATOLOGY

## 2022-07-11 PROCEDURE — 1159F MED LIST DOCD IN RCRD: CPT | Mod: CPTII,S$GLB,, | Performed by: DERMATOLOGY

## 2022-07-11 PROCEDURE — 1160F PR REVIEW ALL MEDS BY PRESCRIBER/CLIN PHARMACIST DOCUMENTED: ICD-10-PCS | Mod: CPTII,S$GLB,, | Performed by: DERMATOLOGY

## 2022-07-11 PROCEDURE — 82607 VITAMIN B-12: CPT | Performed by: DERMATOLOGY

## 2022-07-11 PROCEDURE — 86038 ANTINUCLEAR ANTIBODIES: CPT | Performed by: DERMATOLOGY

## 2022-07-11 PROCEDURE — 1160F RVW MEDS BY RX/DR IN RCRD: CPT | Mod: CPTII,S$GLB,, | Performed by: DERMATOLOGY

## 2022-07-11 PROCEDURE — 99999 PR PBB SHADOW E&M-EST. PATIENT-LVL III: ICD-10-PCS | Mod: PBBFAC,,, | Performed by: DERMATOLOGY

## 2022-07-11 PROCEDURE — 84630 ASSAY OF ZINC: CPT | Performed by: DERMATOLOGY

## 2022-07-11 PROCEDURE — 99999 PR PBB SHADOW E&M-EST. PATIENT-LVL III: CPT | Mod: PBBFAC,,, | Performed by: DERMATOLOGY

## 2022-07-11 PROCEDURE — 99204 OFFICE O/P NEW MOD 45 MIN: CPT | Mod: S$GLB,,, | Performed by: DERMATOLOGY

## 2022-07-11 RX ORDER — FLUOCINONIDE 0.5 MG/G
CREAM TOPICAL
Qty: 60 G | Refills: 0 | Status: ON HOLD | OUTPATIENT
Start: 2022-07-11 | End: 2023-09-14 | Stop reason: HOSPADM

## 2022-07-11 NOTE — PROGRESS NOTES
Subjective:       Patient ID:  Alejandrina Garrison is a 56 y.o. female who presents for   Chief Complaint   Patient presents with    Hair Loss     Scalp      Hair Loss - Initial  Affected locations: scalp  Severity: mild to moderate  Timing: constant        Review of Systems   Constitutional: Negative.    HENT: Negative.    Respiratory: Negative.    Musculoskeletal: Negative.    Skin: Positive for dry skin.        Objective:    Physical Exam   Constitutional: She appears well-developed and well-nourished.   Eyes: No conjunctival no injection.   Neurological: She is alert and oriented to person, place, and time. She is not disoriented.   Psychiatric: She has a normal mood and affect.   Skin:                Diagram Legend     Erythematous scaling macule/papule c/w actinic keratosis       Vascular papule c/w angioma      Pigmented verrucoid papule/plaque c/w seborrheic keratosis      Yellow umbilicated papule c/w sebaceous hyperplasia      Irregularly shaped tan macule c/w lentigo     1-2 mm smooth white papules consistent with Milia      Movable subcutaneous cyst with punctum c/w epidermal inclusion cyst      Subcutaneous movable cyst c/w pilar cyst      Firm pink to brown papule c/w dermatofibroma      Pedunculated fleshy papule(s) c/w skin tag(s)      Evenly pigmented macule c/w junctional nevus     Mildly variegated pigmented, slightly irregular-bordered macule c/w mildly atypical nevus      Flesh colored to evenly pigmented papule c/w intradermal nevus       Pink pearly papule/plaque c/w basal cell carcinoma      Erythematous hyperkeratotic cursted plaque c/w SCC      Surgical scar with no sign of skin cancer recurrence      Open and closed comedones      Inflammatory papules and pustules      Verrucoid papule consistent consistent with wart     Erythematous eczematous patches and plaques     Dystrophic onycholytic nail with subungual debris c/w onychomycosis     Umbilicated papule    Erythematous-base  heme-crusted tan verrucoid plaque consistent with inflamed seborrheic keratosis     Erythematous Silvery Scaling Plaque c/w Psoriasis     See annotation                  Assessment / Plan:        Hand eczema  -     fluocinonide 0.05% (LIDEX) 0.05 % cream; AAA bid finger rash.Stop using steroid topical when skin is smooth and non itchy.  Do not treat dark or red coloring.  Dispense: 60 g; Refill: 0  Discussed with patient the etiology and pathogenesis of the disease or skin lesion(s) and possible treatments and aggravators.    Reviewed with patient different treatment options and associated risks.  Proper application of medications and or care for affected area(s) and condition(s) reviewed.  Discussed with patient the risks of topical and injectable steroids, including, but not limited, to atrophy, rosacea, acne, glaucoma, cataracts, adrenal suppression, striae.  Do not touch eyes with medication.  Stop using steroid topical when skin is smooth and non itchy.  Do not treat dark or red coloring.    Low vitamin D level  Previous [facility] labs and or records and notes reviewed and considered for their impact on our clinical decision making today.  Take vitamin D 5000 iu per day.  Take vitamin D 5000 iu per day.  Patient to consider better supplement brands like Jarrows, Nature's Way, Solaray, LNIDSEY, Pure Encapsulations, and Now.  Top of the line companies are [employer] and [employer].  Can also go to [employer] and look at different brands there.  For iron and zinc, consider bases of glycinate, acetate, citrate, picolinate.    Hair loss  -     Zinc; Future; Expected date: 07/11/2022  -     Vitamin B12; Future; Expected date: 07/11/2022  -     Ferritin; Future; Expected date: 07/11/2022  -     Iron and TIBC; Future; Expected date: 07/11/2022  -     LAILA; Future; Expected date: 07/11/2022  Reviewed with patient to eat protein daily, get labs done, wash with recommended shampoo or soap for the scalp, avoid hair coloring and hot  treatments, and potentially consider topical 5% minoxidil.  Discussed with patient the need for laboratory work up for further evaluation.  Discussed that such investigation may not be helpful.  Can try saw palmetto 500 mg to 1000 mg per day.  Prefer organic version.  Take vitamin D 5000 iu per day.    Encounter for skin care  Discussed with patient to use organic coconut oil or pure shea butter at least daily for moisturization for the body and organic jojoba oil at least daily for the face.  Sandals or slippers at home as not to slide around and risk fall on non carpeted floors if applied to the soles.  Recommended more hand  than water washing for routine germ prevention.  No hot water bathing reviewed.             Follow up in about 3 months (around 10/11/2022).

## 2022-07-11 NOTE — PATIENT INSTRUCTIONS
Avoid hot water   More hand    Moisturize with coconut oil     SCALP    Avoid hot water     Instructed patient to use plain Head and Shoulders shampoo regularly for soaks lasting at least 3 minutes or more to the scalp and or face as directed.  Regular shampoo and conditioner afterward is fine.  For suspected allergy cases, rinse away from the face and body discussed.    40 grams of protein daily   5000 units of vitamin D daily     Patient to consider better supplement brands like Jarrows, Nature's Way, Solaray, LINDSEY, Pure Encapsulations, and Now.  Top of the line companies are [employer] and [employer].  Can also go to [employer] and look at different brands there.  For iron and zinc, consider bases of glycinate, acetate, citrate, picolinate.    Also recommend saw palmetto, preferably organic, 1000 mg per day.

## 2022-07-12 LAB
ANA SER QL IF: NORMAL
FERRITIN SERPL-MCNC: 43 NG/ML (ref 20–300)
IRON SERPL-MCNC: 49 UG/DL (ref 30–160)
SATURATED IRON: 12 % (ref 20–50)
TOTAL IRON BINDING CAPACITY: 413 UG/DL (ref 250–450)
TRANSFERRIN SERPL-MCNC: 279 MG/DL (ref 200–375)
VIT B12 SERPL-MCNC: 338 PG/ML (ref 210–950)

## 2022-07-14 LAB — ZINC SERPL-MCNC: 71 UG/DL (ref 60–130)

## 2022-07-15 ENCOUNTER — PATIENT MESSAGE (OUTPATIENT)
Dept: DERMATOLOGY | Facility: CLINIC | Age: 57
End: 2022-07-15
Payer: COMMERCIAL

## 2022-08-09 ENCOUNTER — OFFICE VISIT (OUTPATIENT)
Dept: FAMILY MEDICINE | Facility: CLINIC | Age: 57
End: 2022-08-09
Payer: COMMERCIAL

## 2022-08-09 ENCOUNTER — PATIENT MESSAGE (OUTPATIENT)
Dept: INTERNAL MEDICINE | Facility: CLINIC | Age: 57
End: 2022-08-09
Payer: COMMERCIAL

## 2022-08-09 VITALS
BODY MASS INDEX: 37.72 KG/M2 | HEART RATE: 80 BPM | SYSTOLIC BLOOD PRESSURE: 132 MMHG | HEIGHT: 67 IN | DIASTOLIC BLOOD PRESSURE: 78 MMHG | WEIGHT: 240.31 LBS | TEMPERATURE: 98 F | RESPIRATION RATE: 16 BRPM | OXYGEN SATURATION: 97 %

## 2022-08-09 DIAGNOSIS — R09.81 NASAL CONGESTION: ICD-10-CM

## 2022-08-09 DIAGNOSIS — U07.1 COVID-19: Primary | ICD-10-CM

## 2022-08-09 LAB
CTP QC/QA: YES
SARS-COV-2 RDRP RESP QL NAA+PROBE: POSITIVE

## 2022-08-09 PROCEDURE — 99212 OFFICE O/P EST SF 10 MIN: CPT | Mod: S$GLB,,, | Performed by: NURSE PRACTITIONER

## 2022-08-09 PROCEDURE — 3078F DIAST BP <80 MM HG: CPT | Mod: CPTII,S$GLB,, | Performed by: NURSE PRACTITIONER

## 2022-08-09 PROCEDURE — 3078F PR MOST RECENT DIASTOLIC BLOOD PRESSURE < 80 MM HG: ICD-10-PCS | Mod: CPTII,S$GLB,, | Performed by: NURSE PRACTITIONER

## 2022-08-09 PROCEDURE — 1159F MED LIST DOCD IN RCRD: CPT | Mod: CPTII,S$GLB,, | Performed by: NURSE PRACTITIONER

## 2022-08-09 PROCEDURE — 1160F RVW MEDS BY RX/DR IN RCRD: CPT | Mod: CPTII,S$GLB,, | Performed by: NURSE PRACTITIONER

## 2022-08-09 PROCEDURE — 99212 PR OFFICE/OUTPT VISIT, EST, LEVL II, 10-19 MIN: ICD-10-PCS | Mod: S$GLB,,, | Performed by: NURSE PRACTITIONER

## 2022-08-09 PROCEDURE — 1160F PR REVIEW ALL MEDS BY PRESCRIBER/CLIN PHARMACIST DOCUMENTED: ICD-10-PCS | Mod: CPTII,S$GLB,, | Performed by: NURSE PRACTITIONER

## 2022-08-09 PROCEDURE — 3075F PR MOST RECENT SYSTOLIC BLOOD PRESS GE 130-139MM HG: ICD-10-PCS | Mod: CPTII,S$GLB,, | Performed by: NURSE PRACTITIONER

## 2022-08-09 PROCEDURE — U0002: ICD-10-PCS | Mod: QW,S$GLB,, | Performed by: NURSE PRACTITIONER

## 2022-08-09 PROCEDURE — 3008F BODY MASS INDEX DOCD: CPT | Mod: CPTII,S$GLB,, | Performed by: NURSE PRACTITIONER

## 2022-08-09 PROCEDURE — 99999 PR PBB SHADOW E&M-EST. PATIENT-LVL IV: ICD-10-PCS | Mod: PBBFAC,,, | Performed by: NURSE PRACTITIONER

## 2022-08-09 PROCEDURE — 1159F PR MEDICATION LIST DOCUMENTED IN MEDICAL RECORD: ICD-10-PCS | Mod: CPTII,S$GLB,, | Performed by: NURSE PRACTITIONER

## 2022-08-09 PROCEDURE — 99999 PR PBB SHADOW E&M-EST. PATIENT-LVL IV: CPT | Mod: PBBFAC,,, | Performed by: NURSE PRACTITIONER

## 2022-08-09 PROCEDURE — 3075F SYST BP GE 130 - 139MM HG: CPT | Mod: CPTII,S$GLB,, | Performed by: NURSE PRACTITIONER

## 2022-08-09 PROCEDURE — 3008F PR BODY MASS INDEX (BMI) DOCUMENTED: ICD-10-PCS | Mod: CPTII,S$GLB,, | Performed by: NURSE PRACTITIONER

## 2022-08-09 PROCEDURE — U0002 COVID-19 LAB TEST NON-CDC: HCPCS | Mod: QW,S$GLB,, | Performed by: NURSE PRACTITIONER

## 2022-08-09 NOTE — PROGRESS NOTES
Subjective:      Patient ID: Alejandrina Garrison is a 56 y.o. female.    Chief Complaint: Sinusitis    Reports nasal congestion that developed 5 days ago. Treated with Mucinex and Dayquil. Over past 1-2 days, noticing increasing facial pressure, postnasal drainage, and ear pressure.     Review of Systems   Constitutional: Negative for fever.   HENT: Positive for congestion, ear pain, postnasal drip, sinus pressure and sinus pain. Negative for rhinorrhea and sore throat.    Respiratory: Negative for cough and shortness of breath.    Gastrointestinal: Negative for abdominal pain and nausea.        Objective:     Vitals:    08/09/22 1126   BP: 132/78   Pulse: 80   Resp: 16   Temp: 98 °F (36.7 °C)      Physical Exam  Vitals reviewed.   Constitutional:       Appearance: She is obese.   HENT:      Right Ear: A middle ear effusion is present.      Left Ear: A middle ear effusion is present.      Nose:      Right Sinus: Maxillary sinus tenderness and frontal sinus tenderness present.      Left Sinus: Maxillary sinus tenderness and frontal sinus tenderness present.   Cardiovascular:      Rate and Rhythm: Normal rate and regular rhythm.   Pulmonary:      Effort: Pulmonary effort is normal. No respiratory distress.      Breath sounds: Normal breath sounds.   Neurological:      Mental Status: She is alert.        Assessment:         1. COVID-19    2. Nasal congestion          Plan:   1. COVID-19  Continue Mucinex. Start Flonase. CDC isolation guidance discussed.     2. Nasal congestion  - POCT COVID-19 Rapid Screening             Edith Rizvi   [facility]   8/9/22

## 2022-08-11 ENCOUNTER — PATIENT MESSAGE (OUTPATIENT)
Dept: GASTROENTEROLOGY | Facility: CLINIC | Age: 57
End: 2022-08-11
Payer: COMMERCIAL

## 2022-08-23 ENCOUNTER — PATIENT MESSAGE (OUTPATIENT)
Dept: GASTROENTEROLOGY | Facility: CLINIC | Age: 57
End: 2022-08-23
Payer: COMMERCIAL

## 2022-09-12 RX ORDER — OLMESARTAN MEDOXOMIL AND HYDROCHLOROTHIAZIDE 40/25 40; 25 MG/1; MG/1
1 TABLET ORAL DAILY
Qty: 90 TABLET | Refills: 3 | Status: SHIPPED | OUTPATIENT
Start: 2022-09-12 | End: 2023-11-03 | Stop reason: SDUPTHER

## 2022-09-12 NOTE — TELEPHONE ENCOUNTER
Care Due:                  Date            Visit Type   Department     Provider  --------------------------------------------------------------------------------                                ESTABLISHED                              PATIENT      DESC FAMILY  Last Visit: 10-      [facility]  Chapis Martinez  Next Visit: None Scheduled  None         None Found                                                            Last  Test          Frequency    Reason                     Performed    Due Date  --------------------------------------------------------------------------------    Office Visit  12 months..  olmesartan-hydrochlorothi  10-   10-                             azide....................    [employer] Embedded Care Gaps. Reference number: [id number]. 9/12/2022   7:53:30 AM CDT

## 2022-09-21 ENCOUNTER — PATIENT MESSAGE (OUTPATIENT)
Dept: INTERNAL MEDICINE | Facility: CLINIC | Age: 57
End: 2022-09-21
Payer: COMMERCIAL

## 2022-09-29 ENCOUNTER — TELEPHONE (OUTPATIENT)
Dept: GASTROENTEROLOGY | Facility: CLINIC | Age: 57
End: 2022-09-29
Payer: COMMERCIAL

## 2022-09-29 NOTE — TELEPHONE ENCOUNTER
Call placed to Lee Tinoco in regards to message received. I advised her that unfortunately she does have to take the laxative tablets with the miraLAX. She stated that was fine she will push through. No further issues noted.

## 2022-09-30 ENCOUNTER — ANESTHESIA (OUTPATIENT)
Dept: ENDOSCOPY | Facility: HOSPITAL | Age: 57
End: 2022-09-30
Payer: COMMERCIAL

## 2022-09-30 ENCOUNTER — HOSPITAL ENCOUNTER (OUTPATIENT)
Facility: HOSPITAL | Age: 57
Discharge: HOME | End: 2022-09-30
Attending: INTERNAL MEDICINE | Admitting: INTERNAL MEDICINE
Payer: COMMERCIAL

## 2022-09-30 ENCOUNTER — ANESTHESIA EVENT (OUTPATIENT)
Dept: ENDOSCOPY | Facility: HOSPITAL | Age: 57
End: 2022-09-30
Payer: COMMERCIAL

## 2022-09-30 DIAGNOSIS — Z12.11 COLON CANCER SCREENING: ICD-10-CM

## 2022-09-30 PROCEDURE — 45380 COLONOSCOPY AND BIOPSY: CPT | Mod: PT | Performed by: INTERNAL MEDICINE

## 2022-09-30 PROCEDURE — 88305 TISSUE EXAM BY PATHOLOGIST: CPT | Mod: 26,,, | Performed by: PATHOLOGY

## 2022-09-30 PROCEDURE — 37000008 HC ANESTHESIA 1ST 15 MINUTES: Performed by: INTERNAL MEDICINE

## 2022-09-30 PROCEDURE — 45380 COLONOSCOPY AND BIOPSY: CPT | Mod: 33,,, | Performed by: INTERNAL MEDICINE

## 2022-09-30 PROCEDURE — 63600175 PHARM REV CODE 636 W HCPCS: Performed by: NURSE ANESTHETIST, CERTIFIED REGISTERED

## 2022-09-30 PROCEDURE — D9220A PRA ANESTHESIA: ICD-10-PCS | Mod: 33,CRNA,, | Performed by: NURSE ANESTHETIST, CERTIFIED REGISTERED

## 2022-09-30 PROCEDURE — 88305 TISSUE EXAM BY PATHOLOGIST: ICD-10-PCS | Mod: 26,,, | Performed by: PATHOLOGY

## 2022-09-30 PROCEDURE — 45380 PR COLONOSCOPY,BIOPSY: ICD-10-PCS | Mod: 33,,, | Performed by: INTERNAL MEDICINE

## 2022-09-30 PROCEDURE — 27201012 HC FORCEPS, HOT/COLD, DISP: Performed by: INTERNAL MEDICINE

## 2022-09-30 PROCEDURE — 25000003 PHARM REV CODE 250: Performed by: INTERNAL MEDICINE

## 2022-09-30 PROCEDURE — D9220A PRA ANESTHESIA: ICD-10-PCS | Mod: 33,ANES,, | Performed by: ANESTHESIOLOGY

## 2022-09-30 PROCEDURE — D9220A PRA ANESTHESIA: Mod: 33,CRNA,, | Performed by: NURSE ANESTHETIST, CERTIFIED REGISTERED

## 2022-09-30 PROCEDURE — D9220A PRA ANESTHESIA: Mod: 33,ANES,, | Performed by: ANESTHESIOLOGY

## 2022-09-30 PROCEDURE — 88305 TISSUE EXAM BY PATHOLOGIST: CPT | Performed by: PATHOLOGY

## 2022-09-30 PROCEDURE — 25000003 PHARM REV CODE 250: Performed by: NURSE ANESTHETIST, CERTIFIED REGISTERED

## 2022-09-30 PROCEDURE — 37000009 HC ANESTHESIA EA ADD 15 MINS: Performed by: INTERNAL MEDICINE

## 2022-09-30 RX ORDER — PROPOFOL 10 MG/ML
VIAL (ML) INTRAVENOUS
Status: DISCONTINUED | OUTPATIENT
Start: 2022-09-30 | End: 2022-09-30

## 2022-09-30 RX ORDER — SODIUM CHLORIDE 9 MG/ML
INJECTION, SOLUTION INTRAVENOUS CONTINUOUS
Status: DISCONTINUED | OUTPATIENT
Start: 2022-09-30 | End: 2022-09-30 | Stop reason: HOSPADM

## 2022-09-30 RX ORDER — LIDOCAINE HCL/PF 100 MG/5ML
SYRINGE (ML) INTRAVENOUS
Status: DISCONTINUED | OUTPATIENT
Start: 2022-09-30 | End: 2022-09-30

## 2022-09-30 RX ADMIN — LIDOCAINE HYDROCHLORIDE 100 MG: 20 INJECTION INTRAVENOUS at 11:09

## 2022-09-30 RX ADMIN — SODIUM CHLORIDE: 0.9 INJECTION, SOLUTION INTRAVENOUS at 11:09

## 2022-09-30 RX ADMIN — PROPOFOL 100 MG: 10 INJECTION, EMULSION INTRAVENOUS at 11:09

## 2022-09-30 RX ADMIN — PROPOFOL 50 MG: 10 INJECTION, EMULSION INTRAVENOUS at 11:09

## 2022-09-30 NOTE — TRANSFER OF CARE
Anesthesia Transfer of Care Note    Patient: Alejandrina Garrison    Procedure(s) Performed: Procedure(s) (LRB):  COLONOSCOPY (N/A)    Patient location: PACU    Anesthesia Type: general    Transport from OR: Transported from OR on room air with adequate spontaneous ventilation    Post pain: adequate analgesia    Post assessment: no apparent anesthetic complications    Post vital signs: stable    Level of consciousness: awake    Nausea/Vomiting: no nausea/vomiting    Complications: none    Transfer of care protocol was followed      Last vitals:   Visit Vitals  BP (!) 167/81   Pulse 86   Temp 36.2 °C (97.2 °F) (Skin)   Resp 16   Wt 102.1 kg (225 lb)   LMP  (LMP Unknown)   SpO2 98%   Breastfeeding No   BMI 35.24 kg/m²

## 2022-09-30 NOTE — ANESTHESIA POSTPROCEDURE EVALUATION
Anesthesia Post Evaluation    Patient: Alejandrina Garrison    Procedure(s) Performed: Procedure(s) (LRB):  COLONOSCOPY (N/A)    Final Anesthesia Type: general      Patient location during evaluation: PACU  Patient participation: Yes- Able to Participate  Level of consciousness: awake and alert  Post-procedure vital signs: reviewed and stable  Pain management: adequate  Airway patency: patent    PONV status at discharge: No PONV  Anesthetic complications: no      Cardiovascular status: hemodynamically stable  Respiratory status: unassisted and room air  Hydration status: euvolemic  Follow-up not needed.          Vitals Value Taken Time   /62 09/30/22 1225     09/30/22 1426   Pulse 71 09/30/22 1225   Resp 18 09/30/22 1225   SpO2 97 % 09/30/22 1225         Event Time   Out of Recovery 12:54:43         Pain/Huseyin Score: Huseyin Score: 10 (9/30/2022 12:25 PM)

## 2022-09-30 NOTE — ANESTHESIA PREPROCEDURE EVALUATION
09/30/2022  Alejandrina Garrison is a 56 y.o., female.      Pre-op Assessment    I have reviewed the Patient Summary Reports.     I have reviewed the Nursing Notes. I have reviewed the NPO Status.   I have reviewed the Medications.     Review of Systems  Anesthesia Hx:  No problems with previous Anesthesia    Social:  Non-Smoker    Hematology/Oncology:  Hematology Normal   Oncology Normal     EENT/Dental:EENT/Dental Normal   Cardiovascular:   Hypertension    Pulmonary:   Shortness of breath    Renal/:  Renal/ Normal     Hepatic/GI:   Bowel Prep.    Musculoskeletal:  Musculoskeletal Normal    Neurological:  Neurology Normal    Endocrine:  Endocrine Normal    Dermatological:  Skin Normal    Psych:  Psychiatric Normal           Physical Exam  General: Well nourished, Cooperative and Alert    Airway:  Mallampati: II   Mouth Opening: Normal  TM Distance: Normal  Tongue: Normal    Dental:  Intact        Anesthesia Plan  Type of Anesthesia, risks & benefits discussed:    Anesthesia Type: Gen Natural Airway  Intra-op Monitoring Plan: Standard ASA Monitors  Induction:  IV  Informed Consent: Informed consent signed with the Patient and all parties understand the risks and agree with anesthesia plan.  All questions answered.   ASA Score: 2  Day of Surgery Review of History & Physical: H&P Update referred to the surgeon/provider.    Ready For Surgery From Anesthesia Perspective.     .

## 2022-09-30 NOTE — [employer] PATIENT INSTRUCTIONS
Discharge Summary/Instructions after an Endoscopic Procedure  Patient Name: Alejandrina Garrison  Patient MRN: [id number]  Patient YOB: 1965 Friday, September 30, 2022  Tena Hall MD  Dear patient,  As a result of recent federal legislation (The Federal Cures Act), you may   receive lab or pathology results from your procedure in your [facility]   account before your physician is able to contact you. Your physician or   their representative will relay the results to you with their   recommendations at their soonest availability.  Thank you,  RESTRICTIONS:  During your procedure today, you received medications for sedation.  These   medications may affect your judgment, balance and coordination.  Therefore,   for 24 hours, you have the following restrictions:   - DO NOT drive a car, operate machinery, make legal/financial decisions,   sign important papers or drink alcohol.    ACTIVITY:  Today: no heavy lifting, straining or running due to procedural   sedation/anesthesia.  The following day: return to full activity including work.  DIET:  Eat and drink normally unless instructed otherwise.     TREATMENT FOR COMMON SIDE EFFECTS:  - Mild abdominal pain, nausea, belching, bloating or excessive gas:  rest,   eat lightly and use a heating pad.  - Sore Throat: treat with throat lozenges and/or gargle with warm salt   water.  - Because air was used during the procedure, expelling large amounts of air   from your rectum or belching is normal.  - If a bowel prep was taken, you may not have a bowel movement for 1-3 days.    This is normal.  SYMPTOMS TO WATCH FOR AND REPORT TO YOUR PHYSICIAN:  1. Abdominal pain or bloating, other than gas cramps.  2. Chest pain.  3. Back pain.  4. Signs of infection such as: chills or fever occurring within 24 hours   after the procedure.  5. Rectal bleeding, which would show as bright red, maroon, or black stools.   (A tablespoon of blood from the rectum is not serious,  especially if   hemorrhoids are present.)  6. Vomiting.  7. Weakness or dizziness.  GO DIRECTLY TO THE NEAREST EMERGENCY ROOM IF YOU HAVE ANY OF THE FOLLOWING:      Difficulty breathing              Chills and/or fever over 101 F   Persistent vomiting and/or vomiting blood   Severe abdominal pain   Severe chest pain   Black, tarry stools   Bleeding- more than one tablespoon   Any other symptom or condition that you feel may need urgent attention  Your doctor recommends these additional instructions:  If any biopsies were taken, your doctors clinic will contact you in 1 to 2   weeks with any results.  - Discharge patient to home (with escort).   - Patient has a contact number available for emergencies.  The signs and   symptoms of potential delayed complications were discussed with the   patient.  Return to normal activities tomorrow.  Written discharge   instructions were provided to the patient.   - Resume previous diet.   - Continue present medications.   - Await pathology results.   - Repeat colonoscopy date to be determined after pending pathology results   are reviewed for surveillance based on pathology results.   - Return to my office PRN.  For questions, problems or results please call your physician - Tena Hall MD at Work:  [phone].  [facility], EMERGENCY ROOM PHONE NUMBER: [phone]  IF A COMPLICATION OR EMERGENCY SITUATION ARISES AND YOU ARE UNABLE TO REACH   YOUR PHYSICIAN - GO DIRECTLY TO THE EMERGENCY ROOM.  Tena Hall MD  9/30/2022 12:11:05 PM  This report has been verified and signed electronically.  Dear patient,  As a result of recent federal legislation (The Federal Cures Act), you may   receive lab or pathology results from your procedure in your [facility]   account before your physician is able to contact you. Your physician or   their representative will relay the results to you with their   recommendations at their soonest  availability.  Thank you,  PR[employer]ATION

## 2022-09-30 NOTE — PLAN OF CARE
Vss, bri po fluids, denies pain, ambulates easily. IV removed, catheter intact. Discharge instructions provided and states understanding. States ready to go home.  Discharged from facility with family per wheelchair.

## 2022-09-30 NOTE — H&P
[facility] Note    CC: Screening colonoscopy    HPI 56 y.o. female presents for routine screening colonoscopy    Past Medical History:   Diagnosis Date    Essential (primary) hypertension     Hypertension        Allergies and Medications reviewed     Review of Systems  General ROS: negative for - chills, fever or weight loss  Cardiovascular ROS: no chest pain or dyspnea on exertion  Gastrointestinal ROS: no blood in stool    Physical Examination  LMP  (LMP Unknown)   General appearance: alert, cooperative, no distress  HENT: Normocephalic, atraumatic, neck symmetrical, no nasal discharge, sclera anicteric   Lungs: clear to auscultation bilaterally, symmetric chest wall expansion bilaterally  Heart: regular rate and rhythm without rub; no displacement of the PMI   Abdomen: soft  Extremities: extremities symmetric; no clubbing, cyanosis, or edema        Labs:  Lab Results   Component Value Date    WBC 8.04 01/11/2022    HGB 12.7 01/11/2022    HCT 41.3 01/11/2022    MCV 82 01/11/2022     01/11/2022         Assessment:   56 y.o. female presents for routine screening colonoscopy    Plan:  -Proceed to colonoscopy     MD [facility]  [address], [address]  [place] [address]  Office: [phone]  Fax: [phone]

## 2022-10-03 VITALS
DIASTOLIC BLOOD PRESSURE: 62 MMHG | SYSTOLIC BLOOD PRESSURE: 134 MMHG | TEMPERATURE: 97 F | WEIGHT: 225 LBS | RESPIRATION RATE: 18 BRPM | BODY MASS INDEX: 35.24 KG/M2 | HEART RATE: 71 BPM | OXYGEN SATURATION: 97 %

## 2022-10-04 LAB
FINAL PATHOLOGIC DIAGNOSIS: NORMAL
GROSS: NORMAL
Lab: NORMAL

## 2023-01-06 ENCOUNTER — PATIENT MESSAGE (OUTPATIENT)
Dept: OTHER | Facility: OTHER | Age: 58
End: 2023-01-06
Payer: COMMERCIAL

## 2023-01-06 ENCOUNTER — PATIENT MESSAGE (OUTPATIENT)
Dept: INTERNAL MEDICINE | Facility: CLINIC | Age: 58
End: 2023-01-06
Payer: COMMERCIAL

## 2023-01-06 DIAGNOSIS — Z00.00 ANNUAL PHYSICAL EXAM: Primary | ICD-10-CM

## 2023-01-06 DIAGNOSIS — I10 ESSENTIAL HYPERTENSION: ICD-10-CM

## 2023-01-09 RX ORDER — AMLODIPINE BESYLATE 5 MG/1
5 TABLET ORAL DAILY
Qty: 30 TABLET | Refills: 11 | Status: SHIPPED | OUTPATIENT
Start: 2023-01-09 | End: 2023-08-01

## 2023-01-18 ENCOUNTER — OFFICE VISIT (OUTPATIENT)
Dept: INTERNAL MEDICINE | Facility: CLINIC | Age: 58
End: 2023-01-18
Payer: COMMERCIAL

## 2023-01-18 VITALS
HEIGHT: 67 IN | BODY MASS INDEX: 37.58 KG/M2 | SYSTOLIC BLOOD PRESSURE: 136 MMHG | WEIGHT: 239.44 LBS | TEMPERATURE: 99 F | HEART RATE: 76 BPM | DIASTOLIC BLOOD PRESSURE: 78 MMHG | OXYGEN SATURATION: 99 %

## 2023-01-18 DIAGNOSIS — Z00.00 ANNUAL PHYSICAL EXAM: Primary | ICD-10-CM

## 2023-01-18 DIAGNOSIS — E66.09 CLASS 2 OBESITY DUE TO EXCESS CALORIES WITHOUT SERIOUS COMORBIDITY WITH BODY MASS INDEX (BMI) OF 37.0 TO 37.9 IN ADULT: ICD-10-CM

## 2023-01-18 DIAGNOSIS — I10 ESSENTIAL HYPERTENSION: ICD-10-CM

## 2023-01-18 DIAGNOSIS — K59.00 CONSTIPATION, UNSPECIFIED CONSTIPATION TYPE: ICD-10-CM

## 2023-01-18 DIAGNOSIS — R00.2 PALPITATIONS: ICD-10-CM

## 2023-01-18 PROBLEM — N93.9 VAGINAL BLEEDING, ABNORMAL: Status: RESOLVED | Noted: 2020-04-16 | Resolved: 2023-01-18

## 2023-01-18 PROBLEM — R06.02 SOBOE (SHORTNESS OF BREATH ON EXERTION): Status: RESOLVED | Noted: 2022-01-31 | Resolved: 2023-01-18

## 2023-01-18 PROCEDURE — 1159F MED LIST DOCD IN RCRD: CPT | Mod: CPTII,S$GLB,, | Performed by: INTERNAL MEDICINE

## 2023-01-18 PROCEDURE — 3008F BODY MASS INDEX DOCD: CPT | Mod: CPTII,S$GLB,, | Performed by: INTERNAL MEDICINE

## 2023-01-18 PROCEDURE — 3078F PR MOST RECENT DIASTOLIC BLOOD PRESSURE < 80 MM HG: ICD-10-PCS | Mod: CPTII,S$GLB,, | Performed by: INTERNAL MEDICINE

## 2023-01-18 PROCEDURE — 99396 PR PREVENTIVE VISIT,EST,40-64: ICD-10-PCS | Mod: S$GLB,,, | Performed by: INTERNAL MEDICINE

## 2023-01-18 PROCEDURE — 3008F PR BODY MASS INDEX (BMI) DOCUMENTED: ICD-10-PCS | Mod: CPTII,S$GLB,, | Performed by: INTERNAL MEDICINE

## 2023-01-18 PROCEDURE — 99999 PR PBB SHADOW E&M-EST. PATIENT-LVL III: ICD-10-PCS | Mod: PBBFAC,,, | Performed by: INTERNAL MEDICINE

## 2023-01-18 PROCEDURE — 3044F PR MOST RECENT HEMOGLOBIN A1C LEVEL <7.0%: ICD-10-PCS | Mod: CPTII,S$GLB,, | Performed by: INTERNAL MEDICINE

## 2023-01-18 PROCEDURE — 99999 PR PBB SHADOW E&M-EST. PATIENT-LVL III: CPT | Mod: PBBFAC,,, | Performed by: INTERNAL MEDICINE

## 2023-01-18 PROCEDURE — 1159F PR MEDICATION LIST DOCUMENTED IN MEDICAL RECORD: ICD-10-PCS | Mod: CPTII,S$GLB,, | Performed by: INTERNAL MEDICINE

## 2023-01-18 PROCEDURE — 3044F HG A1C LEVEL LT 7.0%: CPT | Mod: CPTII,S$GLB,, | Performed by: INTERNAL MEDICINE

## 2023-01-18 PROCEDURE — 3075F PR MOST RECENT SYSTOLIC BLOOD PRESS GE 130-139MM HG: ICD-10-PCS | Mod: CPTII,S$GLB,, | Performed by: INTERNAL MEDICINE

## 2023-01-18 PROCEDURE — 3075F SYST BP GE 130 - 139MM HG: CPT | Mod: CPTII,S$GLB,, | Performed by: INTERNAL MEDICINE

## 2023-01-18 PROCEDURE — 99396 PREV VISIT EST AGE 40-64: CPT | Mod: S$GLB,,, | Performed by: INTERNAL MEDICINE

## 2023-01-18 PROCEDURE — 3078F DIAST BP <80 MM HG: CPT | Mod: CPTII,S$GLB,, | Performed by: INTERNAL MEDICINE

## 2023-01-18 NOTE — PROGRESS NOTES
[facility] Note    Chief Complaint      Chief Complaint   Patient presents with    Hypertension    Annual Exam    upset stomach     History of Present Illness      Alejandrina Garrison is a 57 y.o. female who presents today for Annual preventative visit.  Patient comes to appointment alone.    When she needs to have a BM, gets pressure in her chest, also has some pain in her arms/tingling in hands on occasion.  Symptoms started after colonoscopy.  Used to go to  everyday, now going every other day.  Also had COVID 2022.  Has not been exercising.    Problem List Items Addressed This Visit       Essential hypertension    Current Assessment & Plan     Stable on norvasc 5 mg and Olmesartan/HCTZ 40/25 mg daily. No CP/SOB.          Class 2 obesity due to excess calories without serious comorbidity with body mass index (BMI) of 37.0 to 37.9 in adult    Current Assessment & Plan     Weight stable from last visit.         Palpitations    Current Assessment & Plan     Seen by Dr. Reyes, had TTE and holter which were normal.          Other Visit Diagnoses       Annual physical exam    -  Primary    Constipation, unspecified constipation type                Health Maintenance   Topic Date Due    TETANUS VACCINE  Never done    Mammogram  2023    Lipid Panel  2028    Hepatitis C Screening  Completed       Past Medical History:   Diagnosis Date    Essential (primary) hypertension     Hypertension        Past Surgical History:   Procedure Laterality Date     SECTION  1988    x3, 1998, ,     COLONOSCOPY N/A 2022    Procedure: COLONOSCOPY;  Surgeon: Tena Hall MD;  Location: [facility];  Service: Endoscopy;  Laterality: N/A;    DILATION AND CURETTAGE OF UTERUS          right tubal       SALPINGOOPHORECTOMY      left    TUBAL LIGATION  2008       family history includes Arthritis in her paternal grandmother; Cancer in her father; Colon cancer in her maternal  grandmother; Hypertension in her mother and sister; Leukemia in her father; Osteoporosis in her mother.    Social History     Tobacco Use    Smoking status: Never    Smokeless tobacco: Never   Substance Use Topics    Alcohol use: Yes     Alcohol/week: 1.0 standard drink     Types: 1 Glasses of wine per week     Comment: Social drink, glass of wine if eating out    Drug use: Never       Review of Systems   Constitutional:  Negative for chills and fever.   HENT:  Negative for sore throat.    Respiratory:  Negative for cough and shortness of breath.    Cardiovascular:  Negative for chest pain and palpitations.   Gastrointestinal:  Negative for constipation, diarrhea, nausea and vomiting.   Genitourinary:  Negative for dysuria and hematuria.   Musculoskeletal:  Negative for falls.   Neurological:  Negative for headaches.      Outpatient Encounter Medications as of 1/18/2023   Medication Sig Dispense Refill    olmesartan-hydrochlorothiazide (BENICAR HCT) 40-25 mg per tablet Take 1 tablet by mouth once daily. 90 tablet 3    amLODIPine (NORVASC) 5 MG tablet Take 1 tablet (5 mg total) by mouth once daily. (Patient not taking: Reported on 1/18/2023) 30 tablet 11    fluocinonide 0.05% (LIDEX) 0.05 % cream Apply to affected finger rash twice daily. Stop using steroid topical when skin is smooth and non itchy. Do not treat dark or red coloring. (Patient not taking: Reported on 1/18/2023) 60 g 0    [DISCONTINUED] olmesartan (BENICAR) 40 MG tablet Take 1 tablet by mouth once daily 30 tablet 0     No facility-administered encounter medications on file as of 1/18/2023.        Review of patient's allergies indicates:   Allergen Reactions    Morphine Anaphylaxis, Hives, Itching and Shortness Of Breath    Opioids - morphine analogues Hives and Swelling     Throat swelling    Compazine [prochlorperazine]     Compazine [prochlorperazine] Nausea And Vomiting       Physical Exam      Vital Signs  Temp: 98.5 °F (36.9 °C)  Pulse: 76  SpO2:  "99 %  BP: 136/78  BP Location: Left arm  Patient Position: Sitting  Height and Weight  Height: 5' 7" (170.2 cm)  Weight: 108.6 kg (239 lb 6.7 oz)  BSA (Calculated - sq m): 2.27 sq meters  BMI (Calculated): 37.5  Weight in (lb) to have BMI = 25: 159.3]    Physical Exam  Constitutional:       Appearance: She is well-developed.   HENT:      Head: Normocephalic and atraumatic.      Right Ear: External ear normal.      Left Ear: External ear normal.   Eyes:      General:         Right eye: No discharge.         Left eye: No discharge.   Cardiovascular:      Rate and Rhythm: Normal rate and regular rhythm.      Heart sounds: Normal heart sounds. No murmur heard.  Pulmonary:      Effort: Pulmonary effort is normal. No respiratory distress.      Breath sounds: Normal breath sounds.   Abdominal:      General: There is no distension.      Palpations: Abdomen is soft.      Tenderness: There is no abdominal tenderness. There is no guarding.   Musculoskeletal:         General: Normal range of motion.      Cervical back: Normal range of motion.   Skin:     General: Skin is warm and dry.   Neurological:      Mental Status: She is alert and oriented to person, place, and time.   Psychiatric:         Behavior: Behavior normal.        Laboratory:  CBC:  Recent Labs   Lab Result Units 01/12/23  1057   WBC K/uL 6.17   RBC M/uL 5.09   Hemoglobin g/dL 13.0   Hematocrit % 41.4   Platelets K/uL 328   MCV fL 81*   MCH pg 25.5*   MCHC g/dL 31.4*     CMP:  Recent Labs   Lab Result Units 01/12/23  1057   Glucose mg/dL 102   Calcium mg/dL 9.3   Albumin g/dL 4.8   Total Protein g/dL 7.8   Sodium mmol/L 143   Potassium mmol/L 3.7   CO2 mmol/L 33*   Chloride mmol/L 101   BUN mg/dL 15   Alkaline Phosphatase U/L 89   ALT U/L 21   AST U/L 22   Total Bilirubin mg/dL 0.6     URINALYSIS:  No results for input(s): COLORU, CLARITYU, SPECGRAV, PHUR, PROTEINUA, GLUCOSEU, BILIRUBINCON, BLOODU, WBCU, RBCU, BACTERIA, MUCUS, NITRITE, LEUKOCYTESUR, UROBILINOGEN, " HYALINECASTS in the last 2160 hours.   LIPIDS:  Recent Labs   Lab Result Units 01/12/23  1057   TSH uIU/mL 1.840   HDL mg/dL 79*   Cholesterol mg/dL 127   Triglycerides mg/dL 49   LDL Cholesterol mg/dL 38.2*   HDL/Cholesterol Ratio % 62.2*   Non-HDL Cholesterol mg/dL 48   Total Cholesterol/HDL Ratio  1.6*     TSH:  Recent Labs   Lab Result Units 01/12/23  1057   TSH uIU/mL 1.840     A1C:  Recent Labs   Lab Result Units 01/12/23  1057   Hemoglobin A1C % 5.3       Radiology:  No results found in the last 30 days.     Assessment/Plan     Alejandrina Garrison is a 57 y.o.female with:    1. Annual physical exam    2. Essential hypertension    3. Palpitations    4. Class 2 obesity due to excess calories without serious comorbidity with body mass index (BMI) of 37.0 to 37.9 in adult    5. Constipation, unspecified constipation type    -start fiber, increase water and exercise to see if this helps with bowels  -Continue current medications and maintain follow up with specialists.    -Follow up in about 1 year (around 1/18/2024) for Annual Visit.       Chapis Martinez MD  [facility]

## 2023-02-10 ENCOUNTER — OFFICE VISIT (OUTPATIENT)
Dept: OPTOMETRY | Facility: CLINIC | Age: 58
End: 2023-02-10
Payer: COMMERCIAL

## 2023-02-10 DIAGNOSIS — H52.4 HYPEROPIA OF RIGHT EYE WITH ASTIGMATISM AND PRESBYOPIA: ICD-10-CM

## 2023-02-10 DIAGNOSIS — H52.02 HYPEROPIA WITH PRESBYOPIA OF LEFT EYE: ICD-10-CM

## 2023-02-10 DIAGNOSIS — H52.4 HYPEROPIA WITH PRESBYOPIA OF LEFT EYE: ICD-10-CM

## 2023-02-10 DIAGNOSIS — H52.201 HYPEROPIA OF RIGHT EYE WITH ASTIGMATISM AND PRESBYOPIA: ICD-10-CM

## 2023-02-10 DIAGNOSIS — H25.13 NUCLEAR SCLEROSIS OF BOTH EYES: ICD-10-CM

## 2023-02-10 DIAGNOSIS — Z01.00 ROUTINE EYE EXAM: Primary | ICD-10-CM

## 2023-02-10 DIAGNOSIS — H52.01 HYPEROPIA OF RIGHT EYE WITH ASTIGMATISM AND PRESBYOPIA: ICD-10-CM

## 2023-02-10 PROCEDURE — 92015 PR REFRACTION: ICD-10-PCS | Mod: S$GLB,,, | Performed by: OPTOMETRIST

## 2023-02-10 PROCEDURE — 92015 DETERMINE REFRACTIVE STATE: CPT | Mod: S$GLB,,, | Performed by: OPTOMETRIST

## 2023-02-10 PROCEDURE — 92014 PR EYE EXAM, EST PATIENT,COMPREHESV: ICD-10-PCS | Mod: S$GLB,,, | Performed by: OPTOMETRIST

## 2023-02-10 PROCEDURE — 99999 PR PBB SHADOW E&M-EST. PATIENT-LVL II: CPT | Mod: PBBFAC,,, | Performed by: OPTOMETRIST

## 2023-02-10 PROCEDURE — 99999 PR PBB SHADOW E&M-EST. PATIENT-LVL II: ICD-10-PCS | Mod: PBBFAC,,, | Performed by: OPTOMETRIST

## 2023-02-10 PROCEDURE — 92014 COMPRE OPH EXAM EST PT 1/>: CPT | Mod: S$GLB,,, | Performed by: OPTOMETRIST

## 2023-02-10 NOTE — PROGRESS NOTES
TERRI Garrison is here today for concerns about ocular health. Patient has   no [facility] complaints with [facility].     DLS: (5/26/21- Dr. Barrientos)    (-)Flashes (-)Floaters (-)Diplopia (-)Headaches   (-)Itching (-)Tearing (-) Burning (-)Dryness (-) Photophobia  (-)Glare    Past Eye Sx: No past ocular Sx  Eye Meds: No gtts     Last edited by Donna Díaz on 2/10/2023 11:23 AM.            Assessment /Plan     For exam results, see Encounter Report.    Routine eye exam    Hyperopia of right eye with astigmatism and presbyopia    Hyperopia with presbyopia of left eye    Nuclear sclerosis of both eyes      Eyemed Exam.     2-3. Updated SRx. Mild change from habitual. Monitor yearly.    Pt may be interested in CLs in the future. She will portal message to book. She would like to try monovision daily lenses.     4. Educated pt on findings. Trc NS OU. Not visually significant. No need for removal at this time. Monitor yearly.        RTC in 1 year for annual eye exam or sooner if needed.

## 2023-04-04 ENCOUNTER — OFFICE VISIT (OUTPATIENT)
Dept: PODIATRY | Facility: CLINIC | Age: 58
End: 2023-04-04
Payer: COMMERCIAL

## 2023-04-04 VITALS
RESPIRATION RATE: 18 BRPM | TEMPERATURE: 100 F | SYSTOLIC BLOOD PRESSURE: 129 MMHG | HEIGHT: 67 IN | HEART RATE: 82 BPM | DIASTOLIC BLOOD PRESSURE: 79 MMHG | BODY MASS INDEX: 37.51 KG/M2 | WEIGHT: 239 LBS

## 2023-04-04 DIAGNOSIS — L60.9 DISEASE OF NAIL: Primary | ICD-10-CM

## 2023-04-04 PROCEDURE — 1160F PR REVIEW ALL MEDS BY PRESCRIBER/CLIN PHARMACIST DOCUMENTED: ICD-10-PCS | Mod: CPTII,S$GLB,, | Performed by: PODIATRIST

## 2023-04-04 PROCEDURE — 1159F MED LIST DOCD IN RCRD: CPT | Mod: CPTII,S$GLB,, | Performed by: PODIATRIST

## 2023-04-04 PROCEDURE — 3074F PR MOST RECENT SYSTOLIC BLOOD PRESSURE < 130 MM HG: ICD-10-PCS | Mod: CPTII,S$GLB,, | Performed by: PODIATRIST

## 2023-04-04 PROCEDURE — 99203 OFFICE O/P NEW LOW 30 MIN: CPT | Mod: S$GLB,,, | Performed by: PODIATRIST

## 2023-04-04 PROCEDURE — 3078F DIAST BP <80 MM HG: CPT | Mod: CPTII,S$GLB,, | Performed by: PODIATRIST

## 2023-04-04 PROCEDURE — 3074F SYST BP LT 130 MM HG: CPT | Mod: CPTII,S$GLB,, | Performed by: PODIATRIST

## 2023-04-04 PROCEDURE — 99203 PR OFFICE/OUTPT VISIT, NEW, LEVL III, 30-44 MIN: ICD-10-PCS | Mod: S$GLB,,, | Performed by: PODIATRIST

## 2023-04-04 PROCEDURE — 1160F RVW MEDS BY RX/DR IN RCRD: CPT | Mod: CPTII,S$GLB,, | Performed by: PODIATRIST

## 2023-04-04 PROCEDURE — 99999 PR PBB SHADOW E&M-EST. PATIENT-LVL III: ICD-10-PCS | Mod: PBBFAC,,, | Performed by: PODIATRIST

## 2023-04-04 PROCEDURE — 3044F HG A1C LEVEL LT 7.0%: CPT | Mod: CPTII,S$GLB,, | Performed by: PODIATRIST

## 2023-04-04 PROCEDURE — 3078F PR MOST RECENT DIASTOLIC BLOOD PRESSURE < 80 MM HG: ICD-10-PCS | Mod: CPTII,S$GLB,, | Performed by: PODIATRIST

## 2023-04-04 PROCEDURE — 87101 SKIN FUNGI CULTURE: CPT | Performed by: PODIATRIST

## 2023-04-04 PROCEDURE — 87107 FUNGI IDENTIFICATION MOLD: CPT | Mod: 59 | Performed by: PODIATRIST

## 2023-04-04 PROCEDURE — 1159F PR MEDICATION LIST DOCUMENTED IN MEDICAL RECORD: ICD-10-PCS | Mod: CPTII,S$GLB,, | Performed by: PODIATRIST

## 2023-04-04 PROCEDURE — 3044F PR MOST RECENT HEMOGLOBIN A1C LEVEL <7.0%: ICD-10-PCS | Mod: CPTII,S$GLB,, | Performed by: PODIATRIST

## 2023-04-04 PROCEDURE — 3008F BODY MASS INDEX DOCD: CPT | Mod: CPTII,S$GLB,, | Performed by: PODIATRIST

## 2023-04-04 PROCEDURE — 99999 PR PBB SHADOW E&M-EST. PATIENT-LVL III: CPT | Mod: PBBFAC,,, | Performed by: PODIATRIST

## 2023-04-04 PROCEDURE — 3008F PR BODY MASS INDEX (BMI) DOCUMENTED: ICD-10-PCS | Mod: CPTII,S$GLB,, | Performed by: PODIATRIST

## 2023-04-04 RX ORDER — AMMONIUM LACTATE 12 G/100G
1 CREAM TOPICAL DAILY
Qty: 140 G | Refills: 1 | Status: ON HOLD | OUTPATIENT
Start: 2023-04-04 | End: 2023-09-14 | Stop reason: HOSPADM

## 2023-04-04 NOTE — PROGRESS NOTES
Subjective:      Patient ID: Alejandrina Garrison is a 57 y.o. female.    Chief Complaint:   Nail Problem (Toe nail lift possible fungal )    Alejandrina is a 57 y.o. female who presents to the clinic complaining of thick and discolored toenails on both feet. Alejandrina is inquiring about treatment options.     Patient relates she noticed whiteness discoloration on the end of the left big toe.  This has been a few months.  She is unsure if it was from a pedicure so she stopped getting them.  There is no pain or drainage.  She relates she tried some over-the-counter options and nothing really worked.  She noticed the big toe outside border seems to be changing as well    Patient works as a manager in the radiology department  She always focuses on wearing proper shoes both large enough incorrect size  No injury    Past Medical History:   Diagnosis Date    Essential (primary) hypertension     Hypertension      Past Surgical History:   Procedure Laterality Date     SECTION  1988    x3, 1998, ,     COLONOSCOPY N/A 2022    Procedure: COLONOSCOPY;  Surgeon: Tena Hall MD;  Location: [facility];  Service: Endoscopy;  Laterality: N/A;    DILATION AND CURETTAGE OF UTERUS          right tubal       SALPINGOOPHORECTOMY      left    TUBAL LIGATION  2008     Current Outpatient Medications on File Prior to Visit   Medication Sig Dispense Refill    fluocinonide 0.05% (LIDEX) 0.05 % cream Apply to affected finger rash twice daily. Stop using steroid topical when skin is smooth and non itchy. Do not treat dark or red coloring. 60 g 0    olmesartan-hydrochlorothiazide (BENICAR HCT) 40-25 mg per tablet Take 1 tablet by mouth once daily. 90 tablet 3    amLODIPine (NORVASC) 5 MG tablet Take 1 tablet (5 mg total) by mouth once daily. (Patient not taking: Reported on 2023) 30 tablet 11    [DISCONTINUED] olmesartan (BENICAR) 40 MG tablet Take 1 tablet by mouth once daily 30 tablet 0     No current  "facility-administered medications on file prior to visit.     Review of patient's allergies indicates:   Allergen Reactions    Morphine Anaphylaxis, Hives, Itching and Shortness Of Breath    Opioids - morphine analogues Hives and Swelling     Throat swelling    Compazine [prochlorperazine]     Compazine [prochlorperazine] Nausea And Vomiting       Review of Systems   Constitutional: Negative for chills, decreased appetite, fever, malaise/fatigue, night sweats, weight gain and weight loss.   Cardiovascular:  Negative for chest pain, claudication, dyspnea on exertion, leg swelling, palpitations and syncope.   Respiratory:  Negative for cough and shortness of breath.    Endocrine: Negative for cold intolerance and heat intolerance.   Hematologic/Lymphatic: Negative for bleeding problem. Does not bruise/bleed easily.   Skin:  Positive for nail changes. Negative for color change, dry skin, flushing, itching, poor wound healing, rash, skin cancer, suspicious lesions and unusual hair distribution.   Musculoskeletal:  Negative for arthritis, back pain, falls, gout, joint pain, joint swelling, muscle cramps, muscle weakness, myalgias, neck pain and stiffness.   Gastrointestinal:  Negative for diarrhea, nausea and vomiting.   Neurological:  Negative for dizziness, focal weakness, light-headedness, numbness, paresthesias, tremors, vertigo and weakness.   Psychiatric/Behavioral:  Negative for altered mental status and depression. The patient does not have insomnia.    Allergic/Immunologic: Negative.          Objective:       Vitals:    04/04/23 1349   BP: 129/79   Pulse: 82   Resp: 18   Temp: 100 °F (37.8 °C)   Weight: 108.4 kg (239 lb)   Height: 5' 7" (1.702 m)   PainSc: 0-No pain   108.4 kg (239 lb)     Physical Exam  Vitals reviewed.   Constitutional:       General: She is not in acute distress.     Appearance: She is well-developed. She is not ill-appearing, toxic-appearing or diaphoretic.      Comments: Proper supportive " shoegear      Cardiovascular:      Pulses:           Dorsalis pedis pulses are 2+ on the right side and 2+ on the left side.        Posterior tibial pulses are 2+ on the right side and 2+ on the left side.   Musculoskeletal:         General: No tenderness or deformity.      Right lower leg: No edema.      Left lower leg: No edema.      Right ankle: Normal.      Right Achilles Tendon: Normal. No tenderness.      Left ankle: Normal.      Left Achilles Tendon: Normal. No tenderness.      Right foot: Decreased range of motion. Bunion present. No deformity, tenderness or bony tenderness.      Left foot: Decreased range of motion. Bunion present. No deformity, tenderness or bony tenderness.      Comments: Flexible pes planus foot type w/ medial arch collapse and mild gastroc equinus       Reducible extensor and flexor contractures at the MTPJ and PIPJ of toes 2-5, bilat.          Feet:      Right foot:      Protective Sensation: 10 sites tested.  10 sites sensed.      Skin integrity: No ulcer, blister, skin breakdown, erythema, warmth, callus or dry skin.      Toenail Condition: Right toenails are abnormally thick.      Left foot:      Protective Sensation: 10 sites tested.  10 sites sensed.      Skin integrity: No ulcer, blister, skin breakdown, erythema, warmth, callus or dry skin.      Toenail Condition: Left toenails are abnormally thick.      Comments: Left hallux distal 1/4 mild lysis thickened dystrophic nail no signs of infection    Right hallux 1/3 nail border thickened lysed dystrophic  Skin:     General: Skin is warm.      Capillary Refill: Capillary refill takes 2 to 3 seconds.      Coloration: Skin is not pale.      Findings: No erythema or rash.   Neurological:      Mental Status: She is alert and oriented to person, place, and time.      Sensory: No sensory deficit.      Gait: Gait is intact.   Psychiatric:         Attention and Perception: Attention normal.         Mood and Affect: Mood normal.          Speech: Speech normal.         Behavior: Behavior normal.         Thought Content: Thought content normal.         Cognition and Memory: Cognition normal.         Judgment: Judgment normal.             Assessment:       Encounter Diagnosis   Name Primary?    Disease of nail Yes         Plan:       Alejandrina was seen today for nail problem.    Diagnoses and all orders for this visit:    Disease of nail  -     Fungal culture , skin, hair, or nails    Other orders  -     ammonium lactate 12 % Crea; Apply 1 g topically Daily.      I counseled the patient on her conditions, their implications and medical management.    - reviewed proper shoe gear;   Continue    - pedicure precautions    - The area was cleansed with alcohol prep pad. With patient's permission, the affected toenail was  aggressively reduced back to the proximal matrix region using sterile nail nippers to the soft tissue attachment to obtain nail specimen. Patient tolerated well. No blood was drawn. The specimen was placed in a sterile specimen container and appropriately label with patient confirmation      - recommend starting ammonium lactate appears dystrophic    - positive for fungus will prescribe a topical [facility] as patient has failed over-the-counter options    Will message with results    Follow-up 3 or 4 months          No follow-ups on file.

## 2023-04-19 ENCOUNTER — PATIENT MESSAGE (OUTPATIENT)
Dept: ADMINISTRATIVE | Facility: OTHER | Age: 58
End: 2023-04-19
Payer: COMMERCIAL

## 2023-04-21 ENCOUNTER — PATIENT MESSAGE (OUTPATIENT)
Dept: PODIATRY | Facility: CLINIC | Age: 58
End: 2023-04-21
Payer: COMMERCIAL

## 2023-05-01 LAB — FUNGUS BLD CULT: ABNORMAL

## 2023-07-13 ENCOUNTER — OFFICE VISIT (OUTPATIENT)
Dept: GASTROENTEROLOGY | Facility: CLINIC | Age: 58
End: 2023-07-13
Payer: COMMERCIAL

## 2023-07-13 ENCOUNTER — LAB VISIT (OUTPATIENT)
Dept: LAB | Facility: HOSPITAL | Age: 58
End: 2023-07-13
Attending: STUDENT IN AN ORGANIZED HEALTH CARE EDUCATION/TRAINING PROGRAM
Payer: COMMERCIAL

## 2023-07-13 VITALS
DIASTOLIC BLOOD PRESSURE: 83 MMHG | HEART RATE: 77 BPM | WEIGHT: 245.56 LBS | BODY MASS INDEX: 38.54 KG/M2 | SYSTOLIC BLOOD PRESSURE: 130 MMHG | HEIGHT: 67 IN

## 2023-07-13 DIAGNOSIS — K59.00 CONSTIPATION, UNSPECIFIED CONSTIPATION TYPE: ICD-10-CM

## 2023-07-13 DIAGNOSIS — K21.9 GASTROESOPHAGEAL REFLUX DISEASE, UNSPECIFIED WHETHER ESOPHAGITIS PRESENT: Primary | ICD-10-CM

## 2023-07-13 DIAGNOSIS — E66.01 SEVERE OBESITY (BMI 35.0-39.9) WITH COMORBIDITY: ICD-10-CM

## 2023-07-13 DIAGNOSIS — K21.9 GASTROESOPHAGEAL REFLUX DISEASE, UNSPECIFIED WHETHER ESOPHAGITIS PRESENT: ICD-10-CM

## 2023-07-13 LAB
ALBUMIN SERPL BCP-MCNC: 4 G/DL (ref 3.5–5.2)
ALP SERPL-CCNC: 83 U/L (ref 55–135)
ALT SERPL W/O P-5'-P-CCNC: 17 U/L (ref 10–44)
ANION GAP SERPL CALC-SCNC: 10 MMOL/L (ref 8–16)
AST SERPL-CCNC: 14 U/L (ref 10–40)
BASOPHILS # BLD AUTO: 0.07 K/UL (ref 0–0.2)
BASOPHILS NFR BLD: 0.8 % (ref 0–1.9)
BILIRUB SERPL-MCNC: 0.3 MG/DL (ref 0.1–1)
BUN SERPL-MCNC: 13 MG/DL (ref 6–20)
CALCIUM SERPL-MCNC: 9.7 MG/DL (ref 8.7–10.5)
CHLORIDE SERPL-SCNC: 102 MMOL/L (ref 95–110)
CO2 SERPL-SCNC: 29 MMOL/L (ref 23–29)
CREAT SERPL-MCNC: 0.9 MG/DL (ref 0.5–1.4)
DIFFERENTIAL METHOD: ABNORMAL
EOSINOPHIL # BLD AUTO: 0.2 K/UL (ref 0–0.5)
EOSINOPHIL NFR BLD: 1.8 % (ref 0–8)
ERYTHROCYTE [DISTWIDTH] IN BLOOD BY AUTOMATED COUNT: 14.5 % (ref 11.5–14.5)
EST. GFR  (NO RACE VARIABLE): >60 ML/MIN/1.73 M^2
GLUCOSE SERPL-MCNC: 119 MG/DL (ref 70–110)
HCT VFR BLD AUTO: 41.4 % (ref 37–48.5)
HGB BLD-MCNC: 12.8 G/DL (ref 12–16)
IMM GRANULOCYTES # BLD AUTO: 0.02 K/UL (ref 0–0.04)
IMM GRANULOCYTES NFR BLD AUTO: 0.2 % (ref 0–0.5)
LYMPHOCYTES # BLD AUTO: 2.1 K/UL (ref 1–4.8)
LYMPHOCYTES NFR BLD: 24.2 % (ref 18–48)
MCH RBC QN AUTO: 25.6 PG (ref 27–31)
MCHC RBC AUTO-ENTMCNC: 30.9 G/DL (ref 32–36)
MCV RBC AUTO: 83 FL (ref 82–98)
MONOCYTES # BLD AUTO: 0.4 K/UL (ref 0.3–1)
MONOCYTES NFR BLD: 4.3 % (ref 4–15)
NEUTROPHILS # BLD AUTO: 5.9 K/UL (ref 1.8–7.7)
NEUTROPHILS NFR BLD: 68.7 % (ref 38–73)
NRBC BLD-RTO: 0 /100 WBC
PLATELET # BLD AUTO: 334 K/UL (ref 150–450)
PMV BLD AUTO: 11.1 FL (ref 9.2–12.9)
POTASSIUM SERPL-SCNC: 3.9 MMOL/L (ref 3.5–5.1)
PROT SERPL-MCNC: 7.5 G/DL (ref 6–8.4)
RBC # BLD AUTO: 5 M/UL (ref 4–5.4)
SODIUM SERPL-SCNC: 141 MMOL/L (ref 136–145)
TROPONIN I SERPL DL<=0.01 NG/ML-MCNC: <0.006 NG/ML (ref 0–0.03)
WBC # BLD AUTO: 8.66 K/UL (ref 3.9–12.7)

## 2023-07-13 PROCEDURE — 36415 COLL VENOUS BLD VENIPUNCTURE: CPT | Performed by: STUDENT IN AN ORGANIZED HEALTH CARE EDUCATION/TRAINING PROGRAM

## 2023-07-13 PROCEDURE — 99204 OFFICE O/P NEW MOD 45 MIN: CPT | Mod: S$GLB,,, | Performed by: STUDENT IN AN ORGANIZED HEALTH CARE EDUCATION/TRAINING PROGRAM

## 2023-07-13 PROCEDURE — 3008F BODY MASS INDEX DOCD: CPT | Mod: CPTII,S$GLB,, | Performed by: STUDENT IN AN ORGANIZED HEALTH CARE EDUCATION/TRAINING PROGRAM

## 2023-07-13 PROCEDURE — 3075F SYST BP GE 130 - 139MM HG: CPT | Mod: CPTII,S$GLB,, | Performed by: STUDENT IN AN ORGANIZED HEALTH CARE EDUCATION/TRAINING PROGRAM

## 2023-07-13 PROCEDURE — 3075F PR MOST RECENT SYSTOLIC BLOOD PRESS GE 130-139MM HG: ICD-10-PCS | Mod: CPTII,S$GLB,, | Performed by: STUDENT IN AN ORGANIZED HEALTH CARE EDUCATION/TRAINING PROGRAM

## 2023-07-13 PROCEDURE — 99999 PR PBB SHADOW E&M-EST. PATIENT-LVL III: CPT | Mod: PBBFAC,,, | Performed by: STUDENT IN AN ORGANIZED HEALTH CARE EDUCATION/TRAINING PROGRAM

## 2023-07-13 PROCEDURE — 99999 PR PBB SHADOW E&M-EST. PATIENT-LVL III: ICD-10-PCS | Mod: PBBFAC,,, | Performed by: STUDENT IN AN ORGANIZED HEALTH CARE EDUCATION/TRAINING PROGRAM

## 2023-07-13 PROCEDURE — 3079F PR MOST RECENT DIASTOLIC BLOOD PRESSURE 80-89 MM HG: ICD-10-PCS | Mod: CPTII,S$GLB,, | Performed by: STUDENT IN AN ORGANIZED HEALTH CARE EDUCATION/TRAINING PROGRAM

## 2023-07-13 PROCEDURE — 1159F PR MEDICATION LIST DOCUMENTED IN MEDICAL RECORD: ICD-10-PCS | Mod: CPTII,S$GLB,, | Performed by: STUDENT IN AN ORGANIZED HEALTH CARE EDUCATION/TRAINING PROGRAM

## 2023-07-13 PROCEDURE — 3044F HG A1C LEVEL LT 7.0%: CPT | Mod: CPTII,S$GLB,, | Performed by: STUDENT IN AN ORGANIZED HEALTH CARE EDUCATION/TRAINING PROGRAM

## 2023-07-13 PROCEDURE — 86677 HELICOBACTER PYLORI ANTIBODY: CPT | Performed by: STUDENT IN AN ORGANIZED HEALTH CARE EDUCATION/TRAINING PROGRAM

## 2023-07-13 PROCEDURE — 1159F MED LIST DOCD IN RCRD: CPT | Mod: CPTII,S$GLB,, | Performed by: STUDENT IN AN ORGANIZED HEALTH CARE EDUCATION/TRAINING PROGRAM

## 2023-07-13 PROCEDURE — 3008F PR BODY MASS INDEX (BMI) DOCUMENTED: ICD-10-PCS | Mod: CPTII,S$GLB,, | Performed by: STUDENT IN AN ORGANIZED HEALTH CARE EDUCATION/TRAINING PROGRAM

## 2023-07-13 PROCEDURE — 80053 COMPREHEN METABOLIC PANEL: CPT | Performed by: STUDENT IN AN ORGANIZED HEALTH CARE EDUCATION/TRAINING PROGRAM

## 2023-07-13 PROCEDURE — 84484 ASSAY OF TROPONIN QUANT: CPT | Performed by: STUDENT IN AN ORGANIZED HEALTH CARE EDUCATION/TRAINING PROGRAM

## 2023-07-13 PROCEDURE — 99204 PR OFFICE/OUTPT VISIT, NEW, LEVL IV, 45-59 MIN: ICD-10-PCS | Mod: S$GLB,,, | Performed by: STUDENT IN AN ORGANIZED HEALTH CARE EDUCATION/TRAINING PROGRAM

## 2023-07-13 PROCEDURE — 85025 COMPLETE CBC W/AUTO DIFF WBC: CPT | Performed by: STUDENT IN AN ORGANIZED HEALTH CARE EDUCATION/TRAINING PROGRAM

## 2023-07-13 PROCEDURE — 3079F DIAST BP 80-89 MM HG: CPT | Mod: CPTII,S$GLB,, | Performed by: STUDENT IN AN ORGANIZED HEALTH CARE EDUCATION/TRAINING PROGRAM

## 2023-07-13 PROCEDURE — 3044F PR MOST RECENT HEMOGLOBIN A1C LEVEL <7.0%: ICD-10-PCS | Mod: CPTII,S$GLB,, | Performed by: STUDENT IN AN ORGANIZED HEALTH CARE EDUCATION/TRAINING PROGRAM

## 2023-07-13 RX ORDER — OMEPRAZOLE 40 MG/1
40 CAPSULE, DELAYED RELEASE ORAL EVERY MORNING
Qty: 30 CAPSULE | Refills: 5 | Status: SHIPPED | OUTPATIENT
Start: 2023-07-13 | End: 2024-03-11

## 2023-07-13 NOTE — PROGRESS NOTES
"    [facility] Consultation Note    Reason for Consult:  The primary encounter diagnosis was Gastroesophageal reflux disease, unspecified whether esophagitis present. Diagnoses of Constipation, unspecified constipation type and Severe obesity (BMI 35.0-39.9) with comorbidity were also pertinent to this visit.    PCP:   Chapis Martinez       Referring MD:  No referring provider defined for this encounter.    HPI:  This is a 57 y.o. female here for evaluation of epigastric pain & indigestion.    She notes that beginning Saturday she has been having worsening indigestion without any improvement.  Symptoms seem to occur constantly without any relief.  She has tried Tums and OTC prilosec with some relief.  Denies a sour taste in her throat, just having burning in her chest.    She denies any history of daily GERD.  Just had a similar instance in the past.      Additionally, she describes a palpitation/flutter in her chest that has been     She has had an echo and stress test in the past due to similar symptoms.  Her cardiac evaluation was all unrevealing.       Regarding her bowel movements, she notes there has been a change since menopause and she is very irregular.  She moves her bowels every 1-3 days.  There are also times when she feels the need to go, but is unable to do so.  She has tried various OTC laxatives with improvement.    Objective Findings:    Vital Signs:  /83   Pulse 77   Ht 5' 7" (1.702 m)   Wt 111.4 kg (245 lb 9.5 oz)   LMP  (LMP Unknown)   BMI 38.47 kg/m²   Body mass index is 38.47 kg/m².    Physical Exam:  General Appearance: Well appearing in no acute distress    Abdomen: Soft, non tender, non distended with positive bowel sounds in all four quadrants. No hepatosplenomegaly, ascites, or mass          Assessment:  1. Gastroesophageal reflux disease, unspecified whether esophagitis present    2. Constipation, unspecified constipation type    3. Severe obesity (BMI " 35.0-39.9) with comorbidity      In brief, the patient presents with ongoing GI complaints including chest pressure, GERD, and constipation.    Her symptoms sounds like poorly controlled GERD, but I am not entirely sure why they have suddenly worsened.  I considered cardiac causes, but she had a prior cardiac eval for these symptoms that was unrevealing.  I will check labs including a troponin today.      Additionally, I would like her to begin omeprazole once daily and schedule an EGD to assess for mucosal disease.  We also discussed dietary/lifestyle changes that can help.  She has been trying to lower her weight and is making multiple attempts at increasing her exercise.    Finally, she has intermittent constipation and incomplete bowel movements.  I have recommended she begin miralax once daily.  We also discussed increasing dietary fiber.        Follow up in about 3 months (around 10/13/2023).      Order summary:  Orders Placed This Encounter    CBC Auto Differential    Comprehensive Metabolic Panel    TROPONIN I    H. PYLORI ANTIBODY, IGG    omeprazole (PRILOSEC) 40 MG capsule         Thank you so much for allowing me to participate in the care of Alejandrina Carbone MD

## 2023-07-14 ENCOUNTER — TELEPHONE (OUTPATIENT)
Dept: ENDOSCOPY | Facility: HOSPITAL | Age: 58
End: 2023-07-14
Payer: COMMERCIAL

## 2023-07-14 DIAGNOSIS — K21.9 GASTROESOPHAGEAL REFLUX DISEASE, UNSPECIFIED WHETHER ESOPHAGITIS PRESENT: Primary | ICD-10-CM

## 2023-07-14 LAB — H PYLORI IGG SERPL QL IA: POSITIVE

## 2023-07-14 NOTE — TELEPHONE ENCOUNTER
"    Endo Case Request  Received: Yesterday  MD Yasmine Nino, MICHI; Stefania Solis, MICHI  Procedure: EGD     Diagnosis: GERD     Procedure Timin-4 weeks     *If within 4 weeks selected, please mayra as high priority*     *If greater than 12 weeks, please select "5-12 weeks" and delay sending until 2 months prior to requested date*     Provider: Myself     Location: [facility]     Additional Scheduling Information: No scheduling concerns     Prep Specifications:N/A     Have you attached a patient to this message: yes    "

## 2023-08-16 ENCOUNTER — PATIENT MESSAGE (OUTPATIENT)
Dept: ENDOSCOPY | Facility: HOSPITAL | Age: 58
End: 2023-08-16
Payer: COMMERCIAL

## 2023-08-16 ENCOUNTER — ANESTHESIA EVENT (OUTPATIENT)
Dept: ENDOSCOPY | Facility: HOSPITAL | Age: 58
End: 2023-08-16
Payer: COMMERCIAL

## 2023-08-16 NOTE — ANESTHESIA PREPROCEDURE EVALUATION
08/16/2023  Alejandrina Garrison is a 57 y.o., female.    [facility]  Anesthesia Pre-Operative Evaluation       Patient Name: Alejandrina Garrison  YOB: 1965  MRN: [id number]  CSN: [id number]      Code Status: No Order   Date of Procedure: 8/17/2023  Anesthesia: Choice Procedure: Procedure(s) (LRB):  EGD (ESOPHAGOGASTRODUODENOSCOPY) (N/A)  Pre-Operative Diagnosis: Gastroesophageal reflux disease, unspecified whether esophagitis present [K21.9]  Proceduralist: Surgeon(s) and Role:     * Grant Carbone MD - Primary        SUBJECTIVE:   Alejandrina Garrison is a 57 y.o. female who  has a past medical history of Essential (primary) hypertension and Hypertension..     she has a current medication list which includes the following long-term medication(s): fluocinonide 0.05%, olmesartan-hydrochlorothiazide, omeprazole, and [DISCONTINUED] olmesartan.     ALLERGIES:     Review of patient's allergies indicates:   Allergen Reactions    Morphine Anaphylaxis, Hives, Itching and Shortness Of Breath    Opioids - morphine analogues Hives and Swelling     Throat swelling    Compazine [prochlorperazine]     Compazine [prochlorperazine] Nausea And Vomiting     LDA:          Lines/Drains/Airways     None                Anesthesia Evaluation     Patient summary reviewed    Airway   TM distance: Normal  Dental      Pulmonary    Cardiovascular   (+) hypertension well controlled,   Dysrhythmias: Palpitations.    Neuro/Psych      GI/Hepatic/Renal    (+) GERD,     Endo/Other    Abdominal                 MEDICATIONS:     Antibiotics (From admission, onward)    None        VTE Risk Mitigation (From admission, onward)    None            No current facility-administered medications for this encounter.     Current Outpatient Medications   Medication Sig Dispense Refill    ammonium lactate 12 % Crea  Apply 1 g topically Daily. (Patient not taking: Reported on 2023) 140 g 1    fluocinonide 0.05% (LIDEX) 0.05 % cream Apply to affected finger rash twice daily. Stop using steroid topical when skin is smooth and non itchy. Do not treat dark or red coloring. (Patient not taking: Reported on 2023) 60 g 0    olmesartan-hydrochlorothiazide (BENICAR HCT) 40-25 mg per tablet Take 1 tablet by mouth once daily. 90 tablet 3    omeprazole (PRILOSEC) 40 MG capsule Take 1 capsule (40 mg total) by mouth every morning. 30 capsule 5          History:   There are no hospital problems to display for this patient.    Surgical History:    has a past surgical history that includes Salpingoophorectomy ();  section (); Dilation and curettage of uterus; right tubal ; Tubal ligation (2008); and Colonoscopy (N/A, 2022).   Social History:    reports being sexually active and has had partner(s) who are male. She reports using the following method of birth control/protection: None.  reports that she has never smoked. She has never used smokeless tobacco. She reports current alcohol use of about 1.0 standard drink of alcohol per week. She reports that she does not use drugs.     OBJECTIVE:     Vital Signs (Most Recent):    Vital Signs Range (Last 24H):  BP: ()/()   Arterial Line BP: ()/()        There is no height or weight on file to calculate BMI.   Wt Readings from Last 4 Encounters:   23 111.4 kg (245 lb 9.5 oz)   23 108.4 kg (239 lb)   23 108.6 kg (239 lb 6.7 oz)   22 102.1 kg (225 lb)       Significant Labs:  Lab Results   Component Value Date    WBC 8.66 2023    HGB 12.8 2023    HCT 41.4 2023     2023     2023    K 3.9 2023     2023    CREATININE 0.9 2023    BUN 13 2023    CO2 29 2023     (H) 2023    CALCIUM 9.7 2023    ALKPHOS 83 2023    ALT 17 2023    AST 14  07/13/2023    ALBUMIN 4.0 07/13/2023    HGBA1C 5.3 01/12/2023    TROPONINI <0.006 07/13/2023     No LMP recorded (lmp unknown). Patient is postmenopausal.  No results found for this or any previous visit (from the past 72 hour(s)).    EKG:   Results for orders placed or performed during the hospital encounter of 01/11/22   EKG 12-lead    Collection Time: 01/11/22  8:27 PM    Narrative    Test Reason : R06.02,    Vent. Rate : 083 BPM     Atrial Rate : 083 BPM     P-R Int : 156 ms          QRS Dur : 088 ms      QT Int : 358 ms       P-R-T Axes : 060 010 026 degrees     QTc Int : 420 ms    Normal sinus rhythm  Normal ECG  When compared with ECG of 06-JAN-2021 12:02,  No significant change was found  Confirmed by Angel Cole MD ([id number]) on 1/14/2022 7:44:50 PM    Referred By: [email or web address]   SELF           Confirmed By:Angel Cole MD       TTE:  Results for orders placed or performed during the hospital encounter of 02/07/22   Echo Saline Bubble? No   Result Value Ref Range    AV mean gradient 4 mmHg    Ao peak dinesh 1.34 m/s    Ao VTI 28.40 cm    IVRT 79.92 msec    IVS 0.87 0.6 - 1.1 cm    LA size 3.95 cm    Left Atrium Major Axis 5.56 cm    Left Atrium Minor Axis 5.87 cm    LVIDd 4.80 3.5 - 6.0 cm    LVIDs 3.06 2.1 - 4.0 cm    LVOT peak VTI 24.34 cm    Posterior Wall 0.69 0.6 - 1.1 cm    MV Peak A Dinesh 0.81 m/s    E wave deceleration time 230.52 msec    MV Peak E Dinesh 0.83 m/s    PV Peak D Dinesh 0.45 m/s    PV Peak S Dinesh 0.61 m/s    RA Major Axis 4.79 cm    TAPSE 2.45 cm    TR Max Dinesh 2.33 m/s    LA WIDTH 4.13 cm    PV PEAK VELOCITY 1.24 cm/s    LV Diastolic Volume 107.33 mL    LV Systolic Volume 36.66 mL    LVOT peak dinesh 1.11 m/s    TDI LATERAL 0.10 m/s    TDI SEPTAL 0.09 m/s    LA volume (mod) 55.01 cm3    MV mean gradient 1 mmHg    MV peak gradient 4 mmHg    MV VTI 29.38 cm    LV LATERAL E/E' RATIO 8.30 m/s    LV SEPTAL E/E' RATIO 9.22 m/s    FS 36 %    LA volume 79.19 cm3    LV mass 122.63 g    Left Ventricle  "Relative Wall Thickness 0.29 cm    AV Velocity Ratio 0.83     AV index (prosthetic) 0.86     E/A ratio 1.02     Mean e' 0.10 m/s    Pulm vein S/D ratio 1.36     AV peak gradient 7 mmHg    E/E' ratio 8.74 m/s    Triscuspid Valve Regurgitation Peak Gradient 22 mmHg    BSA 2.26 m2    LV Systolic Volume Index 16.8 mL/m2    LV Diastolic Volume Index 49.23 mL/m2    LA Volume Index 36.3 mL/m2    LV Mass Index 56 g/m2    LA Volume Index (Mod) 25.2 mL/m2    Right Atrial Pressure (from IVC) 3 mmHg    EF 65 %    TV resting pulmonary artery pressure 25 mmHg    Narrative    · The left ventricle is normal in size with normal systolic function.  · The estimated ejection fraction is 65%.  · Normal right ventricular size with normal right ventricular systolic   function.  · Mild left atrial enlargement.  · Mild right atrial enlargement.  · Normal left ventricular diastolic function.  · Mild tricuspid regurgitation.  · Normal central venous pressure (3 mmHg).  · The estimated PA systolic pressure is 25 mmHg.        EF   Date Value Ref Range Status   02/07/2022 65 % Final      No results found for this or any previous visit.  ENRIQUE:  No results found for this or any previous visit.  Stress Test:  Results for orders placed during the hospital encounter of 02/07/22    Exercise Stress - EKG    Interpretation Summary    The EKG portion of this study is negative for ischemia. Sensitivity is reduced secondary to the target heart rate not being achieved.    Sensitivity is reduced secondary to the target heart rate not being achieved.    The patient reported no chest pain during the stress test.    The blood pressure response to stress was normal.    The Duke Treadmill Score was 9.    There were no arrhythmias during stress.    The exercise capacity was average.     LHC:  No results found for this or any previous visit.     PFT:  No results found for: "FEV1", "FVC", "VUW7PMO", "TLC", "DLCO"     ASSESSMENT/PLAN:     Pre-op Assessment    I " have reviewed the Patient Summary Reports.     I have reviewed the Nursing Notes. I have reviewed the NPO Status.   I have reviewed the Medications.     Review of Systems  Anesthesia Hx:  Denies Family Hx of Anesthesia complications.   Denies Personal Hx of Anesthesia complications.   Cardiovascular:   Hypertension, well controlled Dysrhythmias: Palpitations.    Hepatic/GI:   GERD    Endocrine:  Obesity / BMI > 30      Physical Exam  General: Well nourished, Cooperative, Alert and Oriented    Airway:  Mouth Opening: Normal  TM Distance: Normal  Tongue: Normal        Anesthesia Plan  Type of Anesthesia, risks & benefits discussed:    Anesthesia Type: Gen Natural Airway  Intra-op Monitoring Plan: Standard ASA Monitors  Post Op Pain Control Plan: multimodal analgesia  Induction:  IV  Informed Consent: Informed consent signed with the Patient and all parties understand the risks and agree with anesthesia plan.  All questions answered. Patient consented to blood products? No  ASA Score: 2  Day of Surgery Review of History & Physical: H&P Update referred to the surgeon/provider.I have interviewed and examined the patient. I have reviewed the patient's H&P dated: There are no significant changes. H&P completed by Anesthesiologist.    Ready For Surgery From Anesthesia Perspective.     .

## 2023-08-17 ENCOUNTER — ANESTHESIA (OUTPATIENT)
Dept: ENDOSCOPY | Facility: HOSPITAL | Age: 58
End: 2023-08-17
Payer: COMMERCIAL

## 2023-09-14 ENCOUNTER — HOSPITAL ENCOUNTER (OUTPATIENT)
Facility: HOSPITAL | Age: 58
Discharge: HOME | End: 2023-09-14
Attending: INTERNAL MEDICINE | Admitting: INTERNAL MEDICINE
Payer: COMMERCIAL

## 2023-09-14 VITALS
DIASTOLIC BLOOD PRESSURE: 71 MMHG | SYSTOLIC BLOOD PRESSURE: 150 MMHG | HEART RATE: 66 BPM | WEIGHT: 245 LBS | BODY MASS INDEX: 38.45 KG/M2 | RESPIRATION RATE: 20 BRPM | OXYGEN SATURATION: 100 % | TEMPERATURE: 98 F | HEIGHT: 67 IN

## 2023-09-14 DIAGNOSIS — K21.9 GERD (GASTROESOPHAGEAL REFLUX DISEASE): ICD-10-CM

## 2023-09-14 DIAGNOSIS — K21.9 GASTROESOPHAGEAL REFLUX DISEASE, UNSPECIFIED WHETHER ESOPHAGITIS PRESENT: Primary | ICD-10-CM

## 2023-09-14 PROCEDURE — 99900035 HC TECH TIME PER 15 MIN (STAT)

## 2023-09-14 PROCEDURE — D9220A PRA ANESTHESIA: ICD-10-PCS | Mod: ,,, | Performed by: REGISTERED NURSE

## 2023-09-14 PROCEDURE — 88305 TISSUE EXAM BY PATHOLOGIST: CPT | Performed by: PATHOLOGY

## 2023-09-14 PROCEDURE — 88305 TISSUE EXAM BY PATHOLOGIST: CPT | Mod: 26,,, | Performed by: PATHOLOGY

## 2023-09-14 PROCEDURE — 43239 EGD BIOPSY SINGLE/MULTIPLE: CPT | Performed by: INTERNAL MEDICINE

## 2023-09-14 PROCEDURE — 37000009 HC ANESTHESIA EA ADD 15 MINS: Performed by: INTERNAL MEDICINE

## 2023-09-14 PROCEDURE — 43239 EGD BIOPSY SINGLE/MULTIPLE: CPT | Mod: ,,, | Performed by: INTERNAL MEDICINE

## 2023-09-14 PROCEDURE — 88342 CHG IMMUNOCYTOCHEMISTRY: ICD-10-PCS | Mod: 26,,, | Performed by: PATHOLOGY

## 2023-09-14 PROCEDURE — 25000003 PHARM REV CODE 250: Performed by: REGISTERED NURSE

## 2023-09-14 PROCEDURE — 27201012 HC FORCEPS, HOT/COLD, DISP: Performed by: INTERNAL MEDICINE

## 2023-09-14 PROCEDURE — D9220A PRA ANESTHESIA: Mod: ,,, | Performed by: REGISTERED NURSE

## 2023-09-14 PROCEDURE — 88342 IMHCHEM/IMCYTCHM 1ST ANTB: CPT | Mod: 26,,, | Performed by: PATHOLOGY

## 2023-09-14 PROCEDURE — 88342 IMHCHEM/IMCYTCHM 1ST ANTB: CPT | Performed by: PATHOLOGY

## 2023-09-14 PROCEDURE — 43239 PR EGD, FLEX, W/BIOPSY, SGL/MULTI: ICD-10-PCS | Mod: ,,, | Performed by: INTERNAL MEDICINE

## 2023-09-14 PROCEDURE — 88305 TISSUE EXAM BY PATHOLOGIST: ICD-10-PCS | Mod: 26,,, | Performed by: PATHOLOGY

## 2023-09-14 PROCEDURE — 94761 N-INVAS EAR/PLS OXIMETRY MLT: CPT

## 2023-09-14 PROCEDURE — 37000008 HC ANESTHESIA 1ST 15 MINUTES: Performed by: INTERNAL MEDICINE

## 2023-09-14 PROCEDURE — 63600175 PHARM REV CODE 636 W HCPCS: Performed by: REGISTERED NURSE

## 2023-09-14 RX ORDER — LIDOCAINE HYDROCHLORIDE 20 MG/ML
INJECTION INTRAVENOUS
Status: DISCONTINUED | OUTPATIENT
Start: 2023-09-14 | End: 2023-09-15

## 2023-09-14 RX ORDER — PROPOFOL 10 MG/ML
VIAL (ML) INTRAVENOUS
Status: DISCONTINUED | OUTPATIENT
Start: 2023-09-14 | End: 2023-09-15

## 2023-09-14 RX ORDER — SODIUM CHLORIDE 9 MG/ML
INJECTION, SOLUTION INTRAVENOUS CONTINUOUS
Status: DISCONTINUED | OUTPATIENT
Start: 2023-09-14 | End: 2023-09-14 | Stop reason: HOSPADM

## 2023-09-14 RX ADMIN — LIDOCAINE HYDROCHLORIDE 100 MG: 20 INJECTION INTRAVENOUS at 02:09

## 2023-09-14 RX ADMIN — GLYCOPYRROLATE 0.2 MG: 0.2 INJECTION, SOLUTION INTRAMUSCULAR; INTRAVENOUS at 02:09

## 2023-09-14 RX ADMIN — SODIUM CHLORIDE: 0.9 INJECTION, SOLUTION INTRAVENOUS at 02:09

## 2023-09-14 RX ADMIN — PROPOFOL 50 MG: 10 INJECTION, EMULSION INTRAVENOUS at 02:09

## 2023-09-14 RX ADMIN — PROPOFOL 100 MG: 10 INJECTION, EMULSION INTRAVENOUS at 02:09

## 2023-09-14 NOTE — ANESTHESIA POSTPROCEDURE EVALUATION
Anesthesia Post Evaluation    Patient: Alejandrina Garrison    Procedure(s) Performed: Procedure(s) (LRB):  EGD (ESOPHAGOGASTRODUODENOSCOPY) (N/A)    Final Anesthesia Type: general      Patient location during evaluation: GI PACU  Patient participation: Yes- Able to Participate  Level of consciousness: awake and alert  Post-procedure vital signs: reviewed and stable  Pain management: adequate  Airway patency: patent    PONV status at discharge: No PONV  Anesthetic complications: no      Cardiovascular status: blood pressure returned to baseline  Respiratory status: unassisted, spontaneous ventilation and room air  Hydration status: euvolemic  Follow-up not needed.          Vitals Value Taken Time   /67 09/14/23 1502   Temp 36.5 °C (97.7 °F) 09/14/23 1459   Pulse 74 09/14/23 1508   Resp 12 09/14/23 1508   SpO2 99 % 09/14/23 1508   Vitals shown include unvalidated device data.      No case tracking events are documented in the log.      Pain/Huseyin Score: Huseyin Score: 10 (9/14/2023  2:56 PM)

## 2023-09-14 NOTE — PROVATION PATIENT INSTRUCTIONS
Discharge Summary/Instructions after an Endoscopic Procedure  Patient Name: Alejandrina Garrison  Patient MRN: [id number]  Patient YOB: 1965 Thursday, September 14, 2023  Govind James MD  Dear patient,  As a result of recent federal legislation (The Federal Cures Act), you may   receive lab or pathology results from your procedure in your [facility]   account before your physician is able to contact you. Your physician or   their representative will relay the results to you with their   recommendations at their soonest availability.  Thank you,  RESTRICTIONS:  During your procedure today, you received medications for sedation.  These   medications may affect your judgment, balance and coordination.  Therefore,   for 24 hours, you have the following restrictions:   - DO NOT drive a car, operate machinery, make legal/financial decisions,   sign important papers or drink alcohol.    ACTIVITY:  Today: no heavy lifting, straining or running due to procedural   sedation/anesthesia.  The following day: return to full activity including work.  DIET:  Eat and drink normally unless instructed otherwise.     TREATMENT FOR COMMON SIDE EFFECTS:  - Mild abdominal pain, nausea, belching, bloating or excessive gas:  rest,   eat lightly and use a heating pad.  - Sore Throat: treat with throat lozenges and/or gargle with warm salt   water.  - Because air was used during the procedure, expelling large amounts of air   from your rectum or belching is normal.  - If a bowel prep was taken, you may not have a bowel movement for 1-3 days.    This is normal.  SYMPTOMS TO WATCH FOR AND REPORT TO YOUR PHYSICIAN:  1. Abdominal pain or bloating, other than gas cramps.  2. Chest pain.  3. Back pain.  4. Signs of infection such as: chills or fever occurring within 24 hours   after the procedure.  5. Rectal bleeding, which would show as bright red, maroon, or black stools.   (A tablespoon of blood from the rectum is not serious,  especially if   hemorrhoids are present.)  6. Vomiting.  7. Weakness or dizziness.  GO DIRECTLY TO THE NEAREST EMERGENCY ROOM IF YOU HAVE ANY OF THE FOLLOWING:      Difficulty breathing              Chills and/or fever over 101 F   Persistent vomiting and/or vomiting blood   Severe abdominal pain   Severe chest pain   Black, tarry stools   Bleeding- more than one tablespoon   Any other symptom or condition that you feel may need urgent attention  Your doctor recommends these additional instructions:  If any biopsies were taken, your doctors clinic will contact you in 1 to 2   weeks with any results.  - Patient has a contact number available for emergencies.  The signs and   symptoms of potential delayed complications were discussed with the   patient.  Return to normal activities tomorrow.  Written discharge   instructions were provided to the patient.   - Discharge patient to home.   - Resume previous diet.   - Continue present medications.   - Await pathology results.   For questions, problems or results please call your physician - Govind James MD at Work:  [phone].  [facility], EMERGENCY ROOM PHONE NUMBER: [phone]  IF A COMPLICATION OR EMERGENCY SITUATION ARISES AND YOU ARE UNABLE TO REACH   YOUR PHYSICIAN - GO DIRECTLY TO THE EMERGENCY ROOM.  Govind James MD  9/14/2023 2:57:38 PM  This report has been verified and signed electronically.  Dear patient,  As a result of recent federal legislation (The Federal Cures Act), you may   receive lab or pathology results from your procedure in your [facility]   account before your physician is able to contact you. Your physician or   their representative will relay the results to you with their   recommendations at their soonest availability.  Thank you,  PROVATION

## 2023-09-14 NOTE — H&P
Short Stay Endoscopy History and Physical    PCP - Chapis Martinez MD    Procedure - EGD  Sedation: GA  ASA - per anesthesia  Mallampati - per anesthesia  History of Anesthesia problems - no  Family history Anesthesia problems -  no     HPI:  This is a 57 y.o. female here for evaluation of : GERD    Reflux - yes  Dysphagia - no  Abdominal pain - no  Diarrhea - no    ROS:  Constitutional: No fevers, chills, No weight loss  ENT: No allergies  CV: No chest pain  Pulm: No cough, No shortness of breath  Ophtho: No vision changes  GI: see HPI  Medical History:  has a past medical history of Essential (primary) hypertension and Hypertension.    Surgical History:  has a past surgical history that includes Salpingoophorectomy ();  section (); Dilation and curettage of uterus; right tubal ; Tubal ligation (2008); and Colonoscopy (N/A, 2022).    Family History: family history includes Arthritis in her paternal grandmother; Cancer in her father; Colon cancer in her maternal grandmother; Hypertension in her mother and sister; Leukemia in her father; Osteoporosis in her mother.. Otherwise no colon cancer, inflammatory bowel disease, or GI malignancies.    Social History:  reports that she has never smoked. She has never used smokeless tobacco. She reports current alcohol use of about 1.0 standard drink of alcohol per week. She reports that she does not use drugs.    Review of patient's allergies indicates:   Allergen Reactions    Morphine Anaphylaxis, Hives, Itching and Shortness Of Breath    Opioids - morphine analogues Hives and Swelling     Throat swelling    Compazine [prochlorperazine]     Compazine [prochlorperazine] Nausea And Vomiting       Medications:   Medications Prior to Admission   Medication Sig Dispense Refill Last Dose    olmesartan-hydrochlorothiazide (BENICAR HCT) 40-25 mg per tablet Take 1 tablet by mouth once daily. 90 tablet 3 2023 at 0600    ammonium lactate 12 % Crea Apply  1 g topically Daily. (Patient not taking: Reported on 7/13/2023) 140 g 1     fluocinonide 0.05% (LIDEX) 0.05 % cream Apply to affected finger rash twice daily. Stop using steroid topical when skin is smooth and non itchy. Do not treat dark or red coloring. (Patient not taking: Reported on 7/13/2023) 60 g 0     omeprazole (PRILOSEC) 40 MG capsule Take 1 capsule (40 mg total) by mouth every morning. 30 capsule 5 9/12/2023       Objective Findings:    Vital Signs: Per nursing notes.    Physical Exam:  General Appearance: Well appearing in no acute distress  Head:   Normocephalic, without obvious abnormality  Eyes:    No scleral icterus  Airway: Open  Neck: No restriction in mobility  Lungs: CTA bilaterally in anterior and posterior fields, no wheezes, no crackles.  Heart:  Regular rate and rhythm, S1, S2 normal, no murmurs heard  Abdomen: Soft, non tender, non distended      Labs:  Lab Results   Component Value Date    WBC 8.66 07/13/2023    HGB 12.8 07/13/2023    HCT 41.4 07/13/2023     07/13/2023    CHOL 127 01/12/2023    TRIG 49 01/12/2023    HDL 79 (H) 01/12/2023    ALT 17 07/13/2023    AST 14 07/13/2023     07/13/2023    K 3.9 07/13/2023     07/13/2023    CREATININE 0.9 07/13/2023    BUN 13 07/13/2023    CO2 29 07/13/2023    TSH 1.840 01/12/2023    HGBA1C 5.3 01/12/2023         I have explained the risks and benefits of endoscopy procedures to the patient including but not limited to bleeding, perforation, infection, and death.    Thank you so much for allowing me to participate in the care of Alejandrina James MD

## 2023-09-15 NOTE — TRANSFER OF CARE
"Anesthesia Transfer of Care Note    Patient: Alejandrina Garrison    Procedure(s) Performed: Procedure(s) (LRB):  EGD (ESOPHAGOGASTRODUODENOSCOPY) (N/A)    Patient location: PACU    Anesthesia Type: general    Transport from OR: Transported from OR on room air with adequate spontaneous ventilation    Post pain: adequate analgesia    Post assessment: no apparent anesthetic complications and tolerated procedure well    Post vital signs: stable    Level of consciousness: awake    Nausea/Vomiting: no nausea/vomiting    Complications: none    Transfer of care protocol was followed      Last vitals:   Visit Vitals  BP (!) 150/71 (BP Location: Left arm, Patient Position: Lying)   Pulse 66   Temp 36.5 °C (97.7 °F) (Temporal)   Resp 20   Ht 5' 7" (1.702 m)   Wt 111.1 kg (245 lb)   LMP  (LMP Unknown)   SpO2 100%   Breastfeeding No   BMI 38.37 kg/m²     "

## 2023-09-20 LAB
FINAL PATHOLOGIC DIAGNOSIS: NORMAL
GROSS: NORMAL
Lab: NORMAL

## 2023-10-02 ENCOUNTER — TELEPHONE (OUTPATIENT)
Dept: GASTROENTEROLOGY | Facility: CLINIC | Age: 58
End: 2023-10-02
Payer: COMMERCIAL

## 2023-10-02 NOTE — PROGRESS NOTES
Mild inflammation in the stomach. Continue Omeprazole daily and follow up with  if symptoms persist.

## 2023-10-02 NOTE — TELEPHONE ENCOUNTER
----- Message from Govind James MD sent at 10/2/2023  9:57 AM CDT -----  Mild inflammation in the stomach. Continue Omeprazole daily and follow up with  if symptoms persist.

## 2023-11-03 RX ORDER — OLMESARTAN MEDOXOMIL AND HYDROCHLOROTHIAZIDE 40/25 40; 25 MG/1; MG/1
1 TABLET ORAL DAILY
Qty: 90 TABLET | Refills: 0 | Status: SHIPPED | OUTPATIENT
Start: 2023-11-03 | End: 2024-02-16 | Stop reason: SDUPTHER

## 2023-11-03 NOTE — TELEPHONE ENCOUNTER
No care due was identified.  [facility] Embedded Care Due Messages. Reference number: [id number].   11/03/2023 11:01:29 AM CDT

## 2023-11-03 NOTE — TELEPHONE ENCOUNTER
Refill Routing Note   Medication(s) are not appropriate for processing by [facility] for the following reason(s):      Required vitals abnormal    [facility] action(s):  Defer Care Due:  None identified              Appointments  past 12m or future 3m with PCP    Date Provider   Last Visit   1/18/2023 Chapis Martinez MD   Next Visit   Visit date not found Chapis Martinez MD   ED visits in past 90 days: 0        Note composed:2:50 PM 11/03/2023

## 2024-02-16 ENCOUNTER — OFFICE VISIT (OUTPATIENT)
Dept: PRIMARY CARE CLINIC | Facility: CLINIC | Age: 59
End: 2024-02-16
Payer: COMMERCIAL

## 2024-02-16 VITALS
HEART RATE: 72 BPM | DIASTOLIC BLOOD PRESSURE: 80 MMHG | BODY MASS INDEX: 37.27 KG/M2 | OXYGEN SATURATION: 95 % | SYSTOLIC BLOOD PRESSURE: 132 MMHG | WEIGHT: 237.44 LBS | HEIGHT: 67 IN

## 2024-02-16 DIAGNOSIS — K21.9 GASTROESOPHAGEAL REFLUX DISEASE WITHOUT ESOPHAGITIS: ICD-10-CM

## 2024-02-16 DIAGNOSIS — Z00.00 ANNUAL PHYSICAL EXAM: Primary | ICD-10-CM

## 2024-02-16 DIAGNOSIS — R00.2 PALPITATIONS: ICD-10-CM

## 2024-02-16 DIAGNOSIS — I10 ESSENTIAL HYPERTENSION: ICD-10-CM

## 2024-02-16 DIAGNOSIS — Z12.31 ENCOUNTER FOR SCREENING MAMMOGRAM FOR MALIGNANT NEOPLASM OF BREAST: ICD-10-CM

## 2024-02-16 DIAGNOSIS — E66.09 CLASS 2 OBESITY DUE TO EXCESS CALORIES WITHOUT SERIOUS COMORBIDITY WITH BODY MASS INDEX (BMI) OF 37.0 TO 37.9 IN ADULT: ICD-10-CM

## 2024-02-16 PROBLEM — E66.01 SEVERE OBESITY (BMI 35.0-39.9) WITH COMORBIDITY: Status: RESOLVED | Noted: 2023-07-13 | Resolved: 2024-02-16

## 2024-02-16 PROCEDURE — 1159F MED LIST DOCD IN RCRD: CPT | Mod: CPTII,S$GLB,, | Performed by: INTERNAL MEDICINE

## 2024-02-16 PROCEDURE — 99999 PR PBB SHADOW E&M-EST. PATIENT-LVL III: CPT | Mod: PBBFAC,,, | Performed by: INTERNAL MEDICINE

## 2024-02-16 PROCEDURE — 3075F SYST BP GE 130 - 139MM HG: CPT | Mod: CPTII,S$GLB,, | Performed by: INTERNAL MEDICINE

## 2024-02-16 PROCEDURE — 3008F BODY MASS INDEX DOCD: CPT | Mod: CPTII,S$GLB,, | Performed by: INTERNAL MEDICINE

## 2024-02-16 PROCEDURE — 3079F DIAST BP 80-89 MM HG: CPT | Mod: CPTII,S$GLB,, | Performed by: INTERNAL MEDICINE

## 2024-02-16 PROCEDURE — 99396 PREV VISIT EST AGE 40-64: CPT | Mod: S$GLB,,, | Performed by: INTERNAL MEDICINE

## 2024-02-16 NOTE — ASSESSMENT & PLAN NOTE
Stable on prilosec, has not been taking daily.  Still has bloating/belching.    Had COVID in 8/2023.   EGD with Dr. James in 9/2023 with some inflammation, continue PPI.

## 2024-02-16 NOTE — PROGRESS NOTES
[facility] Note    Chief Complaint      Chief Complaint   Patient presents with    Annual Exam     History of Present Illness      Alejandrina Garrison is a 58 y.o. female who presents today for Annual preventative visit.  Patient comes to appointment alone.    Had some constipation, GI advised miralax everyday.      Problem List Items Addressed This Visit       Class 2 obesity due to excess calories without serious comorbidity with body mass index (BMI) of 37.0 to 37.9 in adult    Current Assessment & Plan     Weight stable from last visit.         Relevant Orders    Hemoglobin A1C    Essential hypertension    Current Assessment & Plan     Stable on Olmesartan/HCTZ 40/25 mg daily. No CP/SOB.          Gastroesophageal reflux disease without esophagitis    Current Assessment & Plan     Stable on prilosec, has not been taking daily.  Still has bloating/belching.    Had COVID in 2023.   EGD with Dr. James in 2023 with some inflammation, continue PPI.         Palpitations    Current Assessment & Plan     Seen by Dr. Reyes in past, had TTE and holter which were normal.         Relevant Orders    TSH     Other Visit Diagnoses       Annual physical exam    -  Primary    Relevant Orders    CBC Auto Differential    Lipid Panel    Comprehensive Metabolic Panel    Encounter for screening mammogram for malignant neoplasm of breast        Relevant Orders    Mammo Digital Screening Bilat w/ Omar            Health Maintenance   Topic Date Due    TETANUS VACCINE  Never done    Shingles Vaccine (1 of 2) Never done    Mammogram  2023    Lipid Panel  2028    Colorectal Cancer Screening  2032    Hepatitis C Screening  Completed       Past Medical History:   Diagnosis Date    Essential (primary) hypertension     Hypertension        Past Surgical History:   Procedure Laterality Date     SECTION  1988    x3, 1998, ,     COLONOSCOPY N/A 2022    Procedure: COLONOSCOPY;   Surgeon: Tena Hall MD;  Location: [facility] ENDO;  Service: Endoscopy;  Laterality: N/A;    DILATION AND CURETTAGE OF UTERUS      2018    ESOPHAGOGASTRODUODENOSCOPY N/A 9/14/2023    Procedure: EGD (ESOPHAGOGASTRODUODENOSCOPY);  Surgeon: Govind James MD;  Location: [facility] ENDOSCOPY;  Service: Endoscopy;  Laterality: N/A;  7/14 instruc sent via email  [facility]  9/8 Pre call attempted. Left VM    right tubal       SALPINGOOPHORECTOMY  2008    left    TUBAL LIGATION  04/01/2008       family history includes Arthritis in her paternal grandmother; Cancer in her father; Colon cancer in her maternal grandmother; Hypertension in her mother and sister; Leukemia in her father; Osteoporosis in her mother.    Social History     Tobacco Use    Smoking status: Never    Smokeless tobacco: Never   Substance Use Topics    Alcohol use: Yes     Alcohol/week: 1.0 standard drink of alcohol     Types: 1 Glasses of wine per week     Comment: Social drink, glass of wine if eating out    Drug use: Never       Review of Systems   Constitutional:  Negative for chills and fever.   HENT:  Negative for sore throat.    Respiratory:  Negative for cough and shortness of breath.    Cardiovascular:  Negative for chest pain and palpitations.   Gastrointestinal:  Negative for constipation, diarrhea, nausea and vomiting.   Genitourinary:  Negative for dysuria and hematuria.   Musculoskeletal:  Negative for falls.   Neurological:  Negative for headaches.        Outpatient Encounter Medications as of 2/16/2024   Medication Sig Dispense Refill    olmesartan-hydrochlorothiazide (BENICAR HCT) 40-25 mg per tablet Take 1 tablet by mouth once daily. 90 tablet 0    omeprazole (PRILOSEC) 40 MG capsule Take 1 capsule (40 mg total) by mouth every morning. 30 capsule 5    [DISCONTINUED] olmesartan (BENICAR) 40 MG tablet Take 1 tablet by mouth once daily 30 tablet 0     No facility-administered encounter medications on file as of 2/16/2024.        Review of  "patient's allergies indicates:   Allergen Reactions    Morphine Anaphylaxis, Hives, Itching and Shortness Of Breath    Opioids - morphine analogues Hives and Swelling     Throat swelling    Compazine [prochlorperazine]     Compazine [prochlorperazine] Nausea And Vomiting       Physical Exam      Vital Signs  Pulse: 72  SpO2: 95 %  BP: 132/80  Pain Score: 0-No pain  Height and Weight  Height: 5' 7" (170.2 cm)  Weight: 107.7 kg (237 lb 7 oz)  BSA (Calculated - sq m): 2.26 sq meters  BMI (Calculated): 37.2  Weight in (lb) to have BMI = 25: 159.3]    Physical Exam  Constitutional:       Appearance: She is well-developed.   HENT:      Head: Normocephalic and atraumatic.      Right Ear: External ear normal.      Left Ear: External ear normal.   Eyes:      General:         Right eye: No discharge.         Left eye: No discharge.   Cardiovascular:      Rate and Rhythm: Normal rate and regular rhythm.      Heart sounds: Normal heart sounds. No murmur heard.  Pulmonary:      Effort: Pulmonary effort is normal. No respiratory distress.      Breath sounds: Normal breath sounds.   Abdominal:      General: There is no distension.      Palpations: Abdomen is soft.      Tenderness: There is no abdominal tenderness. There is no guarding.   Musculoskeletal:         General: Normal range of motion.      Cervical back: Normal range of motion.   Skin:     General: Skin is warm and dry.   Neurological:      Mental Status: She is alert and oriented to person, place, and time.   Psychiatric:         Behavior: Behavior normal.          Laboratory:  CBC:  No results for input(s): "WBC", "RBC", "HGB", "HCT", "PLT", "MCV", "MCH", "MCHC" in the last 2160 hours.    CMP:  No results for input(s): "GLU", "CALCIUM", "ALBUMIN", "PROT", "NA", "K", "CO2", "CL", "BUN", "ALKPHOS", "ALT", "AST", "BILITOT" in the last 2160 hours.    Invalid input(s): "CREATININ"    URINALYSIS:  No results for input(s): "COLORU", "CLARITYU", "SPECGRAV", "PHUR", " ""PROTEINUA", "GLUCOSEU", "BILIRUBINCON", "BLOODU", "WBCU", "RBCU", "BACTERIA", "MUCUS", "NITRITE", "LEUKOCYTESUR", "UROBILINOGEN", "HYALINECASTS" in the last 2160 hours.   LIPIDS:  No results for input(s): "TSH", "HDL", "CHOL", "TRIG", "LDLCALC", "CHOLHDL", "NONHDLCHOL", "TOTALCHOLEST" in the last 2160 hours.    TSH:  No results for input(s): "TSH" in the last 2160 hours.    A1C:  No results for input(s): "HGBA1C" in the last 2160 hours.      Radiology:  No results found in the last 30 days.     Assessment/Plan     Alejandrina Garrison is a 58 y.o.female with:    1. Annual physical exam  - CBC Auto Differential; Future  - Lipid Panel; Future  - Comprehensive Metabolic Panel; Future    2. Essential hypertension    3. Palpitations  - TSH; Future    4. Gastroesophageal reflux disease without esophagitis    5. Class 2 obesity due to excess calories without serious comorbidity with body mass index (BMI) of 37.0 to 37.9 in adult  - Hemoglobin A1C; Future    6. Encounter for screening mammogram for malignant neoplasm of breast  - Mammo Digital Screening Bilat w/ Omar; Future    -start fiber  -check labs and order MMG  -counseled on diet and increasing exercise  -Continue current medications and maintain follow up with specialists.    -Follow up in about 1 year (around 2/16/2025) for Annual Visit.       Chapis Martinez MD  [facility]                    "

## 2024-02-16 NOTE — TELEPHONE ENCOUNTER
No care due was identified.  [facility] Embedded Care Due Messages. Reference number: [id number].   2/16/2024 2:02:24 PM CST

## 2024-02-17 RX ORDER — OLMESARTAN MEDOXOMIL AND HYDROCHLOROTHIAZIDE 40/25 40; 25 MG/1; MG/1
1 TABLET ORAL DAILY
Qty: 90 TABLET | Refills: 1 | Status: SHIPPED | OUTPATIENT
Start: 2024-02-17

## 2024-02-17 NOTE — TELEPHONE ENCOUNTER
Refill Decision Note   Alejandrina Garrison  is requesting a refill authorization.  Brief Assessment and Rationale for Refill:  Approve     Medication Therapy Plan:         Comments:     Note composed:3:05 PM 02/17/2024

## 2024-02-19 ENCOUNTER — PATIENT MESSAGE (OUTPATIENT)
Dept: PRIMARY CARE CLINIC | Facility: CLINIC | Age: 59
End: 2024-02-19
Payer: COMMERCIAL

## 2024-02-19 ENCOUNTER — LAB VISIT (OUTPATIENT)
Dept: LAB | Facility: HOSPITAL | Age: 59
End: 2024-02-19
Attending: INTERNAL MEDICINE
Payer: COMMERCIAL

## 2024-02-19 DIAGNOSIS — E66.09 CLASS 2 OBESITY DUE TO EXCESS CALORIES WITHOUT SERIOUS COMORBIDITY WITH BODY MASS INDEX (BMI) OF 37.0 TO 37.9 IN ADULT: ICD-10-CM

## 2024-02-19 DIAGNOSIS — R00.2 PALPITATIONS: ICD-10-CM

## 2024-02-19 DIAGNOSIS — Z00.00 ANNUAL PHYSICAL EXAM: ICD-10-CM

## 2024-02-19 LAB
ALBUMIN SERPL BCP-MCNC: 4 G/DL (ref 3.5–5.2)
ALP SERPL-CCNC: 205 U/L (ref 55–135)
ALT SERPL W/O P-5'-P-CCNC: 365 U/L (ref 10–44)
ANION GAP SERPL CALC-SCNC: 11 MMOL/L (ref 8–16)
AST SERPL-CCNC: 224 U/L (ref 10–40)
BASOPHILS # BLD AUTO: 0.08 K/UL (ref 0–0.2)
BASOPHILS NFR BLD: 1.5 % (ref 0–1.9)
BILIRUB SERPL-MCNC: 0.5 MG/DL (ref 0.1–1)
BUN SERPL-MCNC: 14 MG/DL (ref 6–20)
CALCIUM SERPL-MCNC: 10.1 MG/DL (ref 8.7–10.5)
CHLORIDE SERPL-SCNC: 107 MMOL/L (ref 95–110)
CHOLEST SERPL-MCNC: 149 MG/DL (ref 120–199)
CHOLEST/HDLC SERPL: 1.7 {RATIO} (ref 2–5)
CO2 SERPL-SCNC: 27 MMOL/L (ref 23–29)
CREAT SERPL-MCNC: 0.9 MG/DL (ref 0.5–1.4)
DIFFERENTIAL METHOD BLD: ABNORMAL
EOSINOPHIL # BLD AUTO: 0.1 K/UL (ref 0–0.5)
EOSINOPHIL NFR BLD: 2.5 % (ref 0–8)
ERYTHROCYTE [DISTWIDTH] IN BLOOD BY AUTOMATED COUNT: 14.7 % (ref 11.5–14.5)
EST. GFR  (NO RACE VARIABLE): >60 ML/MIN/1.73 M^2
ESTIMATED AVG GLUCOSE: 108 MG/DL (ref 68–131)
GLUCOSE SERPL-MCNC: 93 MG/DL (ref 70–110)
HBA1C MFR BLD: 5.4 % (ref 4–5.6)
HCT VFR BLD AUTO: 44.5 % (ref 37–48.5)
HDLC SERPL-MCNC: 88 MG/DL (ref 40–75)
HDLC SERPL: 59.1 % (ref 20–50)
HGB BLD-MCNC: 13.5 G/DL (ref 12–16)
IMM GRANULOCYTES # BLD AUTO: 0.01 K/UL (ref 0–0.04)
IMM GRANULOCYTES NFR BLD AUTO: 0.2 % (ref 0–0.5)
LDLC SERPL CALC-MCNC: 52.8 MG/DL (ref 63–159)
LYMPHOCYTES # BLD AUTO: 1.7 K/UL (ref 1–4.8)
LYMPHOCYTES NFR BLD: 33 % (ref 18–48)
MCH RBC QN AUTO: 24.9 PG (ref 27–31)
MCHC RBC AUTO-ENTMCNC: 30.3 G/DL (ref 32–36)
MCV RBC AUTO: 82 FL (ref 82–98)
MONOCYTES # BLD AUTO: 0.3 K/UL (ref 0.3–1)
MONOCYTES NFR BLD: 5 % (ref 4–15)
NEUTROPHILS # BLD AUTO: 3 K/UL (ref 1.8–7.7)
NEUTROPHILS NFR BLD: 57.8 % (ref 38–73)
NONHDLC SERPL-MCNC: 61 MG/DL
NRBC BLD-RTO: 0 /100 WBC
PLATELET # BLD AUTO: 311 K/UL (ref 150–450)
PMV BLD AUTO: 10.4 FL (ref 9.2–12.9)
POTASSIUM SERPL-SCNC: 4.6 MMOL/L (ref 3.5–5.1)
PROT SERPL-MCNC: 7.7 G/DL (ref 6–8.4)
RBC # BLD AUTO: 5.42 M/UL (ref 4–5.4)
SODIUM SERPL-SCNC: 145 MMOL/L (ref 136–145)
TRIGL SERPL-MCNC: 41 MG/DL (ref 30–150)
TSH SERPL DL<=0.005 MIU/L-ACNC: 2.19 UIU/ML (ref 0.4–4)
WBC # BLD AUTO: 5.25 K/UL (ref 3.9–12.7)

## 2024-02-19 PROCEDURE — 83036 HEMOGLOBIN GLYCOSYLATED A1C: CPT | Performed by: INTERNAL MEDICINE

## 2024-02-19 PROCEDURE — 80053 COMPREHEN METABOLIC PANEL: CPT | Performed by: INTERNAL MEDICINE

## 2024-02-19 PROCEDURE — 36415 COLL VENOUS BLD VENIPUNCTURE: CPT | Performed by: INTERNAL MEDICINE

## 2024-02-19 PROCEDURE — 84443 ASSAY THYROID STIM HORMONE: CPT | Performed by: INTERNAL MEDICINE

## 2024-02-19 PROCEDURE — 85025 COMPLETE CBC W/AUTO DIFF WBC: CPT | Performed by: INTERNAL MEDICINE

## 2024-02-19 PROCEDURE — 80061 LIPID PANEL: CPT | Performed by: INTERNAL MEDICINE

## 2024-02-20 ENCOUNTER — PATIENT MESSAGE (OUTPATIENT)
Dept: PRIMARY CARE CLINIC | Facility: CLINIC | Age: 59
End: 2024-02-20
Payer: COMMERCIAL

## 2024-02-20 DIAGNOSIS — R74.8 ELEVATED LIVER ENZYMES: Primary | ICD-10-CM

## 2024-02-24 ENCOUNTER — LAB VISIT (OUTPATIENT)
Dept: LAB | Facility: HOSPITAL | Age: 59
End: 2024-02-24
Attending: INTERNAL MEDICINE
Payer: COMMERCIAL

## 2024-02-24 ENCOUNTER — HOSPITAL ENCOUNTER (OUTPATIENT)
Dept: RADIOLOGY | Facility: HOSPITAL | Age: 59
Discharge: HOME | End: 2024-02-24
Attending: INTERNAL MEDICINE
Payer: COMMERCIAL

## 2024-02-24 DIAGNOSIS — R74.8 ELEVATED LIVER ENZYMES: ICD-10-CM

## 2024-02-24 LAB
ALBUMIN SERPL BCP-MCNC: 4.1 G/DL (ref 3.5–5.2)
ALP SERPL-CCNC: 134 U/L (ref 55–135)
ALT SERPL W/O P-5'-P-CCNC: 89 U/L (ref 10–44)
ANION GAP SERPL CALC-SCNC: 12 MMOL/L (ref 8–16)
AST SERPL-CCNC: 19 U/L (ref 10–40)
BILIRUB SERPL-MCNC: 0.4 MG/DL (ref 0.1–1)
BUN SERPL-MCNC: 15 MG/DL (ref 6–20)
CALCIUM SERPL-MCNC: 9.6 MG/DL (ref 8.7–10.5)
CHLORIDE SERPL-SCNC: 102 MMOL/L (ref 95–110)
CO2 SERPL-SCNC: 27 MMOL/L (ref 23–29)
CREAT SERPL-MCNC: 1 MG/DL (ref 0.5–1.4)
EST. GFR  (NO RACE VARIABLE): >60 ML/MIN/1.73 M^2
GGT SERPL-CCNC: 130 U/L (ref 8–55)
GLUCOSE SERPL-MCNC: 97 MG/DL (ref 70–110)
POTASSIUM SERPL-SCNC: 4.1 MMOL/L (ref 3.5–5.1)
PROT SERPL-MCNC: 7.6 G/DL (ref 6–8.4)
SODIUM SERPL-SCNC: 141 MMOL/L (ref 136–145)

## 2024-02-24 PROCEDURE — 76700 US EXAM ABDOM COMPLETE: CPT | Mod: TC

## 2024-02-24 PROCEDURE — 82977 ASSAY OF GGT: CPT | Performed by: INTERNAL MEDICINE

## 2024-02-24 PROCEDURE — 76700 US EXAM ABDOM COMPLETE: CPT | Mod: 26,,, | Performed by: RADIOLOGY

## 2024-02-24 PROCEDURE — 36415 COLL VENOUS BLD VENIPUNCTURE: CPT | Performed by: INTERNAL MEDICINE

## 2024-02-24 PROCEDURE — 80053 COMPREHEN METABOLIC PANEL: CPT | Performed by: INTERNAL MEDICINE

## 2024-02-26 ENCOUNTER — PATIENT MESSAGE (OUTPATIENT)
Dept: PRIMARY CARE CLINIC | Facility: CLINIC | Age: 59
End: 2024-02-26
Payer: COMMERCIAL

## 2024-02-26 ENCOUNTER — OFFICE VISIT (OUTPATIENT)
Dept: SURGERY | Facility: CLINIC | Age: 59
End: 2024-02-26
Payer: COMMERCIAL

## 2024-02-26 VITALS
WEIGHT: 235.25 LBS | SYSTOLIC BLOOD PRESSURE: 122 MMHG | DIASTOLIC BLOOD PRESSURE: 80 MMHG | HEIGHT: 67 IN | TEMPERATURE: 98 F | BODY MASS INDEX: 36.92 KG/M2 | HEART RATE: 78 BPM

## 2024-02-26 DIAGNOSIS — K80.20 SYMPTOMATIC CHOLELITHIASIS: Primary | ICD-10-CM

## 2024-02-26 DIAGNOSIS — K80.21 CALCULUS OF GALLBLADDER WITH BILIARY OBSTRUCTION BUT WITHOUT CHOLECYSTITIS: Primary | ICD-10-CM

## 2024-02-26 DIAGNOSIS — R74.8 ELEVATED LIVER ENZYMES: ICD-10-CM

## 2024-02-26 DIAGNOSIS — K80.21 CALCULUS OF GALLBLADDER WITH BILIARY OBSTRUCTION BUT WITHOUT CHOLECYSTITIS: ICD-10-CM

## 2024-02-26 PROCEDURE — 3074F SYST BP LT 130 MM HG: CPT | Mod: CPTII,S$GLB,, | Performed by: SURGERY

## 2024-02-26 PROCEDURE — 3079F DIAST BP 80-89 MM HG: CPT | Mod: CPTII,S$GLB,, | Performed by: SURGERY

## 2024-02-26 PROCEDURE — 1159F MED LIST DOCD IN RCRD: CPT | Mod: CPTII,S$GLB,, | Performed by: SURGERY

## 2024-02-26 PROCEDURE — 99999 PR PBB SHADOW E&M-EST. PATIENT-LVL IV: CPT | Mod: PBBFAC,,, | Performed by: SURGERY

## 2024-02-26 PROCEDURE — 3008F BODY MASS INDEX DOCD: CPT | Mod: CPTII,S$GLB,, | Performed by: SURGERY

## 2024-02-26 PROCEDURE — 99214 OFFICE O/P EST MOD 30 MIN: CPT | Mod: S$GLB,,, | Performed by: SURGERY

## 2024-02-26 PROCEDURE — 1160F RVW MEDS BY RX/DR IN RCRD: CPT | Mod: CPTII,S$GLB,, | Performed by: SURGERY

## 2024-02-26 PROCEDURE — 3044F HG A1C LEVEL LT 7.0%: CPT | Mod: CPTII,S$GLB,, | Performed by: SURGERY

## 2024-02-27 RX ORDER — SODIUM CHLORIDE 9 MG/ML
INJECTION, SOLUTION INTRAVENOUS CONTINUOUS
OUTPATIENT
Start: 2024-02-27

## 2024-02-27 NOTE — PROGRESS NOTES
[facility]  OUTPATIENT H&P    REASON FOR VISIT/CC:  Symptomatic cholelithiasis    HPI: Alejandrina Garrison is a 58 y.o. female with chronic GERD like symptoms and diarrhea.  The patient had recent EGD which she says was normal.  All these symptoms have continued she is now also having right upper quadrant pain.  Ultrasound shows multiple large gallstones.  She had elevated LFTs which normalized within a week.    I have reviewed the patient's chart including prior progress notes, procedures and testing.     ROS:   Review of Systems   All other systems reviewed and are negative.      PROBLEM LIST:  Patient Active Problem List   Diagnosis    Essential hypertension    Class 2 obesity due to excess calories without serious comorbidity with body mass index (BMI) of 37.0 to 37.9 in adult    Palpitations    Gastroesophageal reflux disease without esophagitis         HISTORY  Past Medical History:   Diagnosis Date    Essential (primary) hypertension     Hypertension        Past Surgical History:   Procedure Laterality Date     SECTION  1988    x3, , ,     COLONOSCOPY N/A 2022    Procedure: COLONOSCOPY;  Surgeon: Tena Hall MD;  Location: [facility];  Service: Endoscopy;  Laterality: N/A;    DILATION AND CURETTAGE OF UTERUS          ESOPHAGOGASTRODUODENOSCOPY N/A 2023    Procedure: EGD (ESOPHAGOGASTRODUODENOSCOPY);  Surgeon: Govind James MD;  Location[facility] ENDOSCOPY;  Service: Endoscopy;  Laterality: N/A;   instruc sent via email  [facility]   Pre call attempted. Left VM    right tubal       SALPINGOOPHORECTOMY      left    TUBAL LIGATION  2008       Social History     Tobacco Use    Smoking status: Never    Smokeless tobacco: Never   Substance Use Topics    Alcohol use: Yes     Alcohol/week: 1.0 standard drink of alcohol     Types: 1 Glasses of wine per week     Comment: Social drink, glass of wine if eating out    Drug use: Never       Family History    Problem Relation Age of Onset    Hypertension Mother     Osteoporosis Mother     Cancer Father     Leukemia Father     Hypertension Sister     Colon cancer Maternal Grandmother     Arthritis Paternal Grandmother     Breast cancer Neg Hx     Ovarian cancer Neg Hx          MEDS:  Current Outpatient Medications on File Prior to Visit   Medication Sig Dispense Refill    olmesartan-hydrochlorothiazide (BENICAR HCT) 40-25 mg per tablet Take 1 tablet by mouth once daily. 90 tablet 1    omeprazole (PRILOSEC) 40 MG capsule Take 1 capsule (40 mg total) by mouth every morning. (Patient not taking: Reported on 2/26/2024) 30 capsule 5    [DISCONTINUED] olmesartan (BENICAR) 40 MG tablet Take 1 tablet by mouth once daily 30 tablet 0     No current facility-administered medications on file prior to visit.       ALLERGIES:  Review of patient's allergies indicates:   Allergen Reactions    Morphine Anaphylaxis, Hives, Itching and Shortness Of Breath    Opioids - morphine analogues Hives and Swelling     Throat swelling    Compazine [prochlorperazine]     Compazine [prochlorperazine] Nausea And Vomiting         VITALS:  Vitals:    02/26/24 1450   BP: 122/80   Pulse: 78   Temp: 98.1 °F (36.7 °C)         PHYSICAL EXAM:  Physical Exam  Constitutional:       Appearance: Normal appearance.   HENT:      Head: Normocephalic and atraumatic.   Pulmonary:      Effort: Pulmonary effort is normal.   Abdominal:      Palpations: Abdomen is soft.   Skin:     General: Skin is warm and dry.   Neurological:      Mental Status: Mental status is at baseline.   Psychiatric:         Mood and Affect: Mood normal.         Behavior: Behavior normal.           LABS:  Lab Results   Component Value Date    WBC 5.25 02/19/2024    RBC 5.42 (H) 02/19/2024    HGB 13.5 02/19/2024    HCT 44.5 02/19/2024     02/19/2024     Lab Results   Component Value Date    GLU 97 02/24/2024     02/24/2024    K 4.1 02/24/2024     02/24/2024    CO2 27 02/24/2024  "   BUN 15 02/24/2024    CREATININE 1.0 02/24/2024    CALCIUM 9.6 02/24/2024     Lab Results   Component Value Date    ALT 89 (H) 02/24/2024    AST 19 02/24/2024     (H) 02/24/2024    ALKPHOS 134 02/24/2024    BILITOT 0.4 02/24/2024     No results found for: "MG", "PHOS"    STUDIES:  U/S images and reports were personally reviewed.        ASSESSMENT & PLAN:  58 y.o. female with with symptomatic cholelithiasis.  I am not sure all of her symptoms are related to the gallstones but it seems unlikely she would have no improvement in symptoms after surgery.  We will plan for robotic cholecystectomy in March when she returns from a cruise.  Risks, benefits, and alternatives to the procedure were explained to the patient in detail.  All questions were answered and the patient has requested the procedure be done.  Informed consent was obtained.        Se Rascon M.D., F.A.C.S.  [id number]  [facility]        "

## 2024-02-28 ENCOUNTER — TELEPHONE (OUTPATIENT)
Dept: PREADMISSION TESTING | Facility: HOSPITAL | Age: 59
End: 2024-02-28
Payer: COMMERCIAL

## 2024-02-28 DIAGNOSIS — I10 ESSENTIAL HYPERTENSION: ICD-10-CM

## 2024-02-28 DIAGNOSIS — Z01.818 PRE-OP EVALUATION: Primary | ICD-10-CM

## 2024-03-04 ENCOUNTER — CLINICAL SUPPORT (OUTPATIENT)
Dept: LAB | Facility: HOSPITAL | Age: 59
End: 2024-03-04
Payer: COMMERCIAL

## 2024-03-04 DIAGNOSIS — I10 ESSENTIAL HYPERTENSION: ICD-10-CM

## 2024-03-04 DIAGNOSIS — Z01.818 PRE-OP EVALUATION: ICD-10-CM

## 2024-03-04 PROCEDURE — 93010 ELECTROCARDIOGRAM REPORT: CPT | Mod: ,,, | Performed by: INTERNAL MEDICINE

## 2024-03-04 PROCEDURE — 93005 ELECTROCARDIOGRAM TRACING: CPT

## 2024-03-05 LAB
OHS QRS DURATION: 92 MS
OHS QTC CALCULATION: 424 MS

## 2024-03-11 ENCOUNTER — PATIENT MESSAGE (OUTPATIENT)
Dept: ANESTHESIOLOGY | Facility: HOSPITAL | Age: 59
End: 2024-03-11
Payer: COMMERCIAL

## 2024-03-11 ENCOUNTER — HOSPITAL ENCOUNTER (OUTPATIENT)
Dept: PREADMISSION TESTING | Facility: HOSPITAL | Age: 59
Discharge: HOME | End: 2024-03-11
Attending: NURSE PRACTITIONER
Payer: COMMERCIAL

## 2024-03-12 ENCOUNTER — PATIENT MESSAGE (OUTPATIENT)
Dept: SURGERY | Facility: CLINIC | Age: 59
End: 2024-03-12
Payer: COMMERCIAL

## 2024-03-14 ENCOUNTER — PATIENT MESSAGE (OUTPATIENT)
Dept: SURGERY | Facility: CLINIC | Age: 59
End: 2024-03-14
Payer: COMMERCIAL

## 2024-04-07 ENCOUNTER — PATIENT MESSAGE (OUTPATIENT)
Dept: SURGERY | Facility: CLINIC | Age: 59
End: 2024-04-07
Payer: COMMERCIAL

## 2024-06-07 ENCOUNTER — OFFICE VISIT (OUTPATIENT)
Dept: URGENT CARE | Facility: CLINIC | Age: 59
End: 2024-06-07
Payer: COMMERCIAL

## 2024-06-07 VITALS
HEIGHT: 67 IN | BODY MASS INDEX: 36.88 KG/M2 | WEIGHT: 235 LBS | TEMPERATURE: 98 F | SYSTOLIC BLOOD PRESSURE: 136 MMHG | DIASTOLIC BLOOD PRESSURE: 82 MMHG | RESPIRATION RATE: 20 BRPM | HEART RATE: 87 BPM

## 2024-06-07 DIAGNOSIS — B96.89 ACUTE BACTERIAL SINUSITIS: Primary | ICD-10-CM

## 2024-06-07 DIAGNOSIS — J01.90 ACUTE BACTERIAL SINUSITIS: Primary | ICD-10-CM

## 2024-06-07 DIAGNOSIS — J32.9 RECURRENT SINUS INFECTIONS: ICD-10-CM

## 2024-06-07 PROCEDURE — 99203 OFFICE O/P NEW LOW 30 MIN: CPT | Mod: S$GLB,,, | Performed by: NURSE PRACTITIONER

## 2024-06-07 RX ORDER — AZELASTINE 1 MG/ML
1 SPRAY, METERED NASAL 2 TIMES DAILY
Qty: 30 ML | Refills: 0 | Status: SHIPPED | OUTPATIENT
Start: 2024-06-07

## 2024-06-07 RX ORDER — AMOXICILLIN AND CLAVULANATE POTASSIUM 875; 125 MG/1; MG/1
1 TABLET, FILM COATED ORAL EVERY 12 HOURS
Qty: 14 TABLET | Refills: 0 | Status: SHIPPED | OUTPATIENT
Start: 2024-06-07 | End: 2024-06-07

## 2024-06-07 RX ORDER — BENZONATATE 100 MG/1
100 CAPSULE ORAL 3 TIMES DAILY PRN
Qty: 30 CAPSULE | Refills: 0 | Status: SHIPPED | OUTPATIENT
Start: 2024-06-07 | End: 2024-06-07

## 2024-06-07 RX ORDER — FLUTICASONE PROPIONATE 50 MCG
1 SPRAY, SUSPENSION (ML) NASAL DAILY
Qty: 16 G | Refills: 0 | Status: SHIPPED | OUTPATIENT
Start: 2024-06-07

## 2024-06-07 RX ORDER — AZELASTINE 1 MG/ML
1 SPRAY, METERED NASAL 2 TIMES DAILY
Qty: 30 ML | Refills: 0 | Status: SHIPPED | OUTPATIENT
Start: 2024-06-07 | End: 2024-06-07

## 2024-06-07 RX ORDER — FLUTICASONE PROPIONATE 50 MCG
1 SPRAY, SUSPENSION (ML) NASAL DAILY
Qty: 15.8 ML | Refills: 0 | Status: SHIPPED | OUTPATIENT
Start: 2024-06-07 | End: 2024-06-07

## 2024-06-07 RX ORDER — AMOXICILLIN AND CLAVULANATE POTASSIUM 875; 125 MG/1; MG/1
1 TABLET, FILM COATED ORAL EVERY 12 HOURS
Qty: 14 TABLET | Refills: 0 | Status: SHIPPED | OUTPATIENT
Start: 2024-06-07 | End: 2024-06-14

## 2024-06-07 RX ORDER — BENZONATATE 100 MG/1
100 CAPSULE ORAL 3 TIMES DAILY PRN
Qty: 30 CAPSULE | Refills: 0 | Status: SHIPPED | OUTPATIENT
Start: 2024-06-07 | End: 2024-06-17

## 2024-06-07 NOTE — PATIENT INSTRUCTIONS
Augmentin twice a day for 7 days  Please take a probiotic while you are on antibiotics plus a few weeks. Examples of probiotics include brand names such as Align, Floraster and Culturelle, but generic versions are ok too. Kefir is a milk product that is also an excellent probiotic and can be taken as 1/4 cup three times a day with meals.      Symptomatic treatment to include:    Rest, increase fluid intake to thin mucus, include electrolyte replacement  Ibuprofen/Tylenol as directed for fever, sore throat, body aches  Contine xyzal daily  Flonase daily until bottle empty  Astelin twice daily until congestion resolves, then just as needed  guaifenesin 1200 mg twice daily for 10 days to thin mucus  Tessalon Perles cough pills best use for cough caused by postnasal drip/throat irritation  Nasal saline spray several times a day  or nasal wash systems  Warm, salt water gargles, over the counter throat lozenges or sprays for sore throat.

## 2024-06-07 NOTE — PROGRESS NOTES
"Subjective:      Patient ID: Alejandrina Garrison is a 58 y.o. female.    Vitals:  height is 5' 7" (1.702 m) and weight is 106.6 kg (235 lb). Her temperature is 98.3 °F (36.8 °C). Her blood pressure is 136/82 and her pulse is 87. Her respiration is 20.     Chief Complaint: Sinus Problem    Onset sx's 5 days.  More right sided.  Hx of recurrent sinus infections    Sinus Problem  Episode onset: x's 5 days ago. The problem has been gradually worsening since onset. Associated symptoms include chills, congestion (right > left), coughing, headaches, sinus pressure and sneezing. Pertinent negatives include no ear pain or shortness of breath. Treatments tried: NSAID's. The treatment provided mild relief.       Constitution: Positive for chills. Negative for fever.   HENT:  Positive for congestion (right > left), postnasal drip and sinus pressure. Negative for ear pain and ear discharge.    Cardiovascular:  Negative for chest pain.   Respiratory:  Positive for cough. Negative for chest tightness, shortness of breath, wheezing and asthma.    Gastrointestinal:  Negative for abdominal pain, nausea, vomiting and diarrhea.   Musculoskeletal:  Negative for muscle ache.   Skin:  Negative for rash.   Allergic/Immunologic: Positive for sneezing. Negative for asthma.   Neurological:  Positive for headaches.      Objective:     Physical Exam   Constitutional: She is oriented to person, place, and time. She is cooperative.  Non-toxic appearance. She does not appear ill. No distress. awake  HENT:   Head: Normocephalic and atraumatic.   Ears:   Right Ear: External ear and ear canal normal. Tympanic membrane is bulging. Tympanic membrane is not erythematous.   Left Ear: Tympanic membrane, external ear and ear canal normal.   Nose: Rhinorrhea and congestion present.   Mouth/Throat: Mucous membranes are moist. No oropharyngeal exudate or posterior oropharyngeal erythema.   Eyes: Conjunctivae are normal. Right eye exhibits no discharge. Left " eye exhibits no discharge.   Neck: Neck supple. No neck rigidity present.   Cardiovascular: Normal rate, regular rhythm and normal heart sounds.   Pulmonary/Chest: Effort normal and breath sounds normal. No accessory muscle usage. No tachypnea. No respiratory distress. She has no wheezes. She has no rhonchi. She has no rales. She exhibits no tenderness.   Abdominal: Normal appearance.   Musculoskeletal:      Cervical back: She exhibits no tenderness.   Lymphadenopathy:     She has no cervical adenopathy.   Neurological: no focal deficit. She is alert and oriented to person, place, and time. No sensory deficit.   Skin: Skin is warm, dry, not diaphoretic and no rash. Capillary refill takes 2 to 3 seconds.   Psychiatric: Her behavior is normal. Mood normal.   Nursing note and vitals reviewed.      Assessment:     1. Acute bacterial sinusitis    2. Recurrent sinus infections        Plan:       Acute bacterial sinusitis  -     Discontinue: azelastine (ASTELIN) 137 mcg (0.1 %) nasal spray; 1 spray (137 mcg total) by Nasal route 2 (two) times daily.  Dispense: 30 mL; Refill: 0  -     Discontinue: fluticasone propionate (FLONASE) 50 mcg/actuation nasal spray; 1 spray (50 mcg total) by Each Nostril route once daily.  Dispense: 15.8 mL; Refill: 0  -     Discontinue: amoxicillin-clavulanate 875-125mg (AUGMENTIN) 875-125 mg per tablet; Take 1 tablet by mouth every 12 (twelve) hours. for 7 days  Dispense: 14 tablet; Refill: 0  -     Discontinue: benzonatate (TESSALON) 100 MG capsule; Take 1 capsule (100 mg total) by mouth 3 (three) times daily as needed for Cough.  Dispense: 30 capsule; Refill: 0  -     amoxicillin-clavulanate 875-125mg (AUGMENTIN) 875-125 mg per tablet; Take 1 tablet by mouth every 12 (twelve) hours. for 7 days  Dispense: 14 tablet; Refill: 0  -     azelastine (ASTELIN) 137 mcg (0.1 %) nasal spray; 1 spray (137 mcg total) by Nasal route 2 (two) times daily.  Dispense: 30 mL; Refill: 0  -     benzonatate  (TESSALON) 100 MG capsule; Take 1 capsule (100 mg total) by mouth 3 (three) times daily as needed for Cough.  Dispense: 30 capsule; Refill: 0  -     fluticasone propionate (FLONASE) 50 mcg/actuation nasal spray; 1 spray (50 mcg total) by Each Nostril route once daily.  Dispense: 15.8 mL; Refill: 0    Recurrent sinus infections

## 2024-06-19 ENCOUNTER — PATIENT MESSAGE (OUTPATIENT)
Dept: PRIMARY CARE CLINIC | Facility: CLINIC | Age: 59
End: 2024-06-19
Payer: COMMERCIAL

## 2024-06-30 ENCOUNTER — PATIENT MESSAGE (OUTPATIENT)
Dept: ADMINISTRATIVE | Facility: OTHER | Age: 59
End: 2024-06-30
Payer: COMMERCIAL

## 2024-07-01 RX ORDER — OLMESARTAN MEDOXOMIL AND HYDROCHLOROTHIAZIDE 40/25 40; 25 MG/1; MG/1
1 TABLET ORAL DAILY
Qty: 90 TABLET | Refills: 2 | Status: SHIPPED | OUTPATIENT
Start: 2024-07-01

## 2024-07-01 NOTE — TELEPHONE ENCOUNTER
No care due was identified.  [facility] Embedded Care Due Messages. Reference number: [id number].   7/01/2024 1:39:54 PM CDT

## 2024-07-01 NOTE — TELEPHONE ENCOUNTER
Refill Decision Note   Alejandrina Garrison  is requesting a refill authorization.  Brief Assessment and Rationale for Refill:  Approve     Medication Therapy Plan:        Comments:     Note composed:2:36 PM 07/01/2024

## 2024-07-15 ENCOUNTER — OFFICE VISIT (OUTPATIENT)
Dept: SURGERY | Facility: CLINIC | Age: 59
End: 2024-07-15
Payer: COMMERCIAL

## 2024-07-15 DIAGNOSIS — K82.9 GALLBLADDER PROBLEM: ICD-10-CM

## 2024-07-15 PROCEDURE — 3044F HG A1C LEVEL LT 7.0%: CPT | Mod: CPTII,S$GLB,, | Performed by: SURGERY

## 2024-07-15 PROCEDURE — 99204 OFFICE O/P NEW MOD 45 MIN: CPT | Mod: S$GLB,,, | Performed by: SURGERY

## 2024-07-15 PROCEDURE — 99999 PR PBB SHADOW E&M-EST. PATIENT-LVL I: CPT | Mod: PBBFAC,,, | Performed by: SURGERY

## 2024-07-16 NOTE — H&P
GENERAL SURGERY  OUTPATIENT H&P    REASON FOR VISIT/CC: Gallstones    HPI: Alejandrina Garrison is a 58 y.o. female with known history of gallstones who presents for evaluation. Patient was history of right upper quadrant abdominal discomfort often with eating.  Sometimes associated with nausea.  No fevers or chills, yellowing of the skin or eyes.  He was previously diagnosed with gallstones. Had been evaluated by different surgeon however surgery was never completed.  Was referred for evaluation by PCP. Has history of C-sections and tubal ligation but no other intra-abdominal surgeries.    I have reviewed the patient's chart including prior progress notes, procedures and testing.     ROS:   Review of Systems    PROBLEM LIST:  Patient Active Problem List   Diagnosis    Essential hypertension    Class 2 obesity due to excess calories without serious comorbidity with body mass index (BMI) of 37.0 to 37.9 in adult    Palpitations    Gastroesophageal reflux disease without esophagitis         HISTORY  Past Medical History:   Diagnosis Date    Essential (primary) hypertension     Hypertension        Past Surgical History:   Procedure Laterality Date     SECTION  1988    x3, , ,     COLONOSCOPY N/A 2022    Procedure: COLONOSCOPY;  Surgeon: Tena Hall MD;  Location: [facility] ENDO;  Service: Endoscopy;  Laterality: N/A;    DILATION AND CURETTAGE OF UTERUS          ESOPHAGOGASTRODUODENOSCOPY N/A 2023    Procedure: EGD (ESOPHAGOGASTRODUODENOSCOPY);  Surgeon: Govind James MD;  Location: [facility] ENDOSCOPY;  Service: Endoscopy;  Laterality: N/A;   instruc sent via email  [facility]   Pre call attempted. Left VM    right tubal       SALPINGOOPHORECTOMY      left    TUBAL LIGATION  2008       Social History     Tobacco Use    Smoking status: Never    Smokeless tobacco: Never   Substance Use Topics    Alcohol use: Yes     Alcohol/week: 1.0 standard drink of alcohol     Types: 1  Glasses of wine per week     Comment: Social drink, glass of wine if eating out    Drug use: Never       Family History   Problem Relation Name Age of Onset    Hypertension Mother Latha Garrison     Osteoporosis Mother Latha Garrison     Cancer Father Grant Rogel     Leukemia Father Grant Reyesr     Hypertension Sister David Garrison     Colon cancer Maternal Grandmother      Arthritis Paternal Grandmother Jeanette Hankins     Breast cancer Neg Hx      Ovarian cancer Neg Hx           MEDS:  Current Outpatient Medications on File Prior to Visit   Medication Sig Dispense Refill    azelastine (ASTELIN) 137 mcg (0.1 %) nasal spray 1 spray (137 mcg total) by Nasal route 2 (two) times daily. 30 mL 0    fluticasone propionate (FLONASE) 50 mcg/actuation nasal spray 1 spray (50 mcg total) by Each Nostril route once daily. 16 g 0    olmesartan-hydrochlorothiazide (BENICAR HCT) 40-25 mg per tablet Take 1 tablet by mouth once daily. 90 tablet 2    [DISCONTINUED] olmesartan (BENICAR) 40 MG tablet Take 1 tablet by mouth once daily 30 tablet 0     No current facility-administered medications on file prior to visit.       ALLERGIES:  Review of patient's allergies indicates:   Allergen Reactions    Morphine Anaphylaxis, Hives, Itching and Shortness Of Breath    Opioids - morphine analogues Hives and Swelling     Throat swelling    Compazine [prochlorperazine]     Compazine [prochlorperazine] Nausea And Vomiting         VITALS:  There were no vitals filed for this visit.      PHYSICAL EXAM:  Physical Exam  Vitals reviewed.   Constitutional:       General: She is not in acute distress.     Appearance: Normal appearance. She is well-developed.   HENT:      Head: Normocephalic and atraumatic.      Nose: Nose normal.   Eyes:      General: No scleral icterus.     Conjunctiva/sclera: Conjunctivae normal.   Neck:      Trachea: No tracheal tenderness or tracheal deviation.   Cardiovascular:      Rate and Rhythm:  "Normal rate and regular rhythm.      Pulses: Normal pulses.   Pulmonary:      Effort: Pulmonary effort is normal. No accessory muscle usage or respiratory distress.      Breath sounds: Normal breath sounds.   Abdominal:      General: There is no distension.      Palpations: Abdomen is soft.      Tenderness: There is no abdominal tenderness.   Musculoskeletal:         General: No swelling or tenderness. Normal range of motion.      Cervical back: Normal range of motion and neck supple. No rigidity.   Skin:     General: Skin is warm and dry.      Coloration: Skin is not jaundiced.      Findings: No erythema.   Neurological:      General: No focal deficit present.      Mental Status: She is alert and oriented to person, place, and time.      Motor: No weakness or abnormal muscle tone.   Psychiatric:         Mood and Affect: Mood normal.         Behavior: Behavior normal.         Thought Content: Thought content normal.         Judgment: Judgment normal.           LABS:  Lab Results   Component Value Date    WBC 5.25 02/19/2024    RBC 5.42 (H) 02/19/2024    HGB 13.5 02/19/2024    HCT 44.5 02/19/2024     02/19/2024     Lab Results   Component Value Date    GLU 97 02/24/2024     02/24/2024    K 4.1 02/24/2024     02/24/2024    CO2 27 02/24/2024    BUN 15 02/24/2024    CREATININE 1.0 02/24/2024    CALCIUM 9.6 02/24/2024     Lab Results   Component Value Date    ALT 89 (H) 02/24/2024    AST 19 02/24/2024     (H) 02/24/2024    ALKPHOS 134 02/24/2024    BILITOT 0.4 02/24/2024     No results found for: "MG", "PHOS"    STUDIES:  Images and reports were personally reviewed.  Abdominal ultrasound 02/24/2024  FINDINGS:  Pancreas: The visualized portions of pancreas appear normal.     Aorta: No aneurysm.     Liver: 15.0 cm, normal in size. Homogeneous parenchymal echotexture. Echogenic, consistent with fatty infiltration.  No focal abnormalities are present.     Gallbladder: The gallbladder is filled with " gallstones.  The posterior wall is obscured.  The anterior wall is not thickened.  There is no pericholecystic fluid.  No sonographic Aceves's sign was elicited.     Biliary system: 5 mm common bile duct.  No intrahepatic ductal dilatation.     Inferior vena cava: Normal in appearance.     Right kidney: 11.1 normal cm. No hydronephrosis.     Left kidney: 12.6 cm. No hydronephrosis.     Spleen: 12.4 cm.  Normal in size with homogeneous echotexture.     Miscellaneous: No ascites.     Impression:     Fatty infiltration of the liver.  Cholelithiasis without ultrasound evidence of acute cholecystitis..      ASSESSMENT & PLAN:  58 y.o. female with gallstones and upper abdominal discomfort  -signs and symptoms consistent with symptomatic cholelithiasis  -expect patient would have improvement of symptoms with cholecystectomy  - risks and benefits of cholecystectomy were reviewed.  Specifically we discussed the risk of pain, bleeding, scarring, infection, bile leak, injury to common bile duct, injury to surrounding organs, retained stone, pancreatitis, bile leak, failure to relieve symptoms, etc.  we also discussed the need for potential conversion to open procedure or need to perform intraoperative cholangiogram.  The patient expressed understanding of these risks and agreed proceed with surgical intervention.  -patient was interested in laparoscopic cholecystectomy will discuss with work and contact the office to schedule in the future  -will need updated labs, EKGs recent from 3/4/24

## 2024-08-26 ENCOUNTER — OFFICE VISIT (OUTPATIENT)
Dept: OBSTETRICS AND GYNECOLOGY | Facility: CLINIC | Age: 59
End: 2024-08-26
Payer: COMMERCIAL

## 2024-08-26 VITALS
SYSTOLIC BLOOD PRESSURE: 120 MMHG | BODY MASS INDEX: 36.13 KG/M2 | HEART RATE: 73 BPM | WEIGHT: 230.19 LBS | RESPIRATION RATE: 16 BRPM | HEIGHT: 67 IN | DIASTOLIC BLOOD PRESSURE: 74 MMHG | OXYGEN SATURATION: 97 %

## 2024-08-26 DIAGNOSIS — Z12.4 PAP SMEAR FOR CERVICAL CANCER SCREENING: ICD-10-CM

## 2024-08-26 DIAGNOSIS — Z00.00 ANNUAL PHYSICAL EXAM: ICD-10-CM

## 2024-08-26 DIAGNOSIS — Z01.419 WELL WOMAN EXAM WITH ROUTINE GYNECOLOGICAL EXAM: Primary | ICD-10-CM

## 2024-08-26 PROCEDURE — 3078F DIAST BP <80 MM HG: CPT | Mod: CPTII,S$GLB,, | Performed by: FAMILY MEDICINE

## 2024-08-26 PROCEDURE — 3044F HG A1C LEVEL LT 7.0%: CPT | Mod: CPTII,S$GLB,, | Performed by: FAMILY MEDICINE

## 2024-08-26 PROCEDURE — 99999 PR PBB SHADOW E&M-EST. PATIENT-LVL IV: CPT | Mod: PBBFAC,,, | Performed by: FAMILY MEDICINE

## 2024-08-26 PROCEDURE — 87624 HPV HI-RISK TYP POOLED RSLT: CPT | Performed by: FAMILY MEDICINE

## 2024-08-26 PROCEDURE — 3074F SYST BP LT 130 MM HG: CPT | Mod: CPTII,S$GLB,, | Performed by: FAMILY MEDICINE

## 2024-08-26 PROCEDURE — 1160F RVW MEDS BY RX/DR IN RCRD: CPT | Mod: CPTII,S$GLB,, | Performed by: FAMILY MEDICINE

## 2024-08-26 PROCEDURE — 99386 PREV VISIT NEW AGE 40-64: CPT | Mod: S$GLB,,, | Performed by: FAMILY MEDICINE

## 2024-08-26 PROCEDURE — 3008F BODY MASS INDEX DOCD: CPT | Mod: CPTII,S$GLB,, | Performed by: FAMILY MEDICINE

## 2024-08-26 PROCEDURE — 1159F MED LIST DOCD IN RCRD: CPT | Mod: CPTII,S$GLB,, | Performed by: FAMILY MEDICINE

## 2024-08-26 NOTE — PROGRESS NOTES
"  HISTORY OF THE PRESENT ILLNESS    2024  Alejandrina Garrison is a 58 y.o. here for Annual Exam  EMBX and D&C hysteroscopy in 2018 , repeat EMBX in .      G'sP's:   LMP: No LMP recorded (lmp unknown). Patient is postmenopausal.  Relationship: in relationship but not long term  Contraception: BTL or tubes removed and postmenopausal    PAP'S: no h/o abnormals  PAP: PAP neg / Date: 2024    Last Mammogram: 22 Results:  negative    HPV Vaccine: n/a (>45)    HRT: denies   BLEEDING: denies     LABS & RADS   Lab Results   Component Value Date    WBC 5.25 2024    HGB 13.5 2024    HCT 44.5 2024     2024    MCV 82 2024      Lab Results   Component Value Date    TSH 2.194 2024      No results found for: "LABURIN"  Lab Results   Component Value Date    HEPCAB Negative 2020    [id number] Negative 2020     Final Pathologic Diagnosis ENDOMETRIAL BIOPSY:  -  Scant specimen consisting of fragments of weakly proliferative endometrium  with focal tubal metaplasia and focal stromal breakdown.  -  Rare fragment of benign squamous epithelium.  -  No evidence of dysplasia, hyperplasia, atypia or malignancy.   Comment: Interpreted by: Any Orr MD, Signed on 2020 at 12:13          GYNECOLOGIC HISTORY    Genital HSV: no  STD Hx: no past history    OBSTETRIC HISTORY  OB History    Para Term  AB Living   8 8 4 0 0 4   SAB IAB Ectopic Multiple Live Births   0 0 0   4      # Outcome Date GA Lbr Kwasi/2nd Weight Sex Type Anes PTL Lv   8 Term            7 Term            6 Term            5 Term            4 Para      CS-Unspec   FLASH   3 Para         FLASH   2 Para      CS-Unspec   FLASH   1 Para      CS-Unspec   FLASH       PAST MEDICAL HISTORY  -------------------------------------    Essential (primary) hypertension    Hypertension         PAST SURGICAL HISTORY  ----------------------------     section    x3, , ,  "    Colonoscopy    Procedure: COLONOSCOPY;  Surgeon: Tena Hall MD;  Location: [facility] ENDO;  Service: Endoscopy;  Laterality: N/A;    Dilation and curettage of uterus    2018    Esophagogastroduodenoscopy    Procedure: EGD (ESOPHAGOGASTRODUODENOSCOPY);  Surgeon: Govind James MD;  Location: [facility] ENDOSCOPY;  Service: Endoscopy;  Laterality: N/A;  7/14 instruc sent via email  [facility]  9/8 Pre call attempted. Left VM    Right tubal     Salpingoophorectomy    left    Tubal ligation         ALLERGIES  Review of patient's allergies indicates:   Allergen Reactions    Morphine Anaphylaxis, Hives, Itching and Shortness Of Breath    Opioids - morphine analogues Hives and Swelling     Throat swelling    Compazine [prochlorperazine]     Compazine [prochlorperazine] Nausea And Vomiting       MEDICATIONS  Current Outpatient Medications   Medication Instructions    azelastine (ASTELIN) 137 mcg, Nasal, 2 times daily    fluticasone propionate (FLONASE) 50 mcg, Each Nostril, Daily    olmesartan-hydrochlorothiazide (BENICAR HCT) 40-25 mg per tablet 1 tablet, Oral, Daily       SOCIAL HISTORY  Social History     Tobacco Use    Smoking status: Never    Smokeless tobacco: Never   Substance Use Topics    Alcohol use: Yes     Alcohol/week: 1.0 standard drink of alcohol     Types: 1 Glasses of wine per week     Comment: Social drink, glass of wine if eating out    Drug use: Never      Lives with: alone  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   Respiratory:  Negative for cough and shortness of breath.    Genitourinary:  Negative for dysuria, flank pain, frequency, pelvic pain, urgency and urinary incontinence.   Psychiatric/Behavioral:  Negative for depression.       --------------------------------------------------------------------------------------------------------------    PHYSICAL EXAM  VITALS:  Vitals:    08/26/24 1601   BP: 120/74   Pulse: 73   Resp: 16       Physical Exam:   Constitutional: She is oriented to person, place, and time. She appears well-developed and well-nourished.    HENT:   Head: Normocephalic.    Eyes: Pupils are equal, round, and reactive to light. Conjunctivae and EOM are normal.      Pulmonary/Chest: Effort normal.          Genitourinary:    Inguinal canal, vagina, uterus, right adnexa, left adnexa and rectum normal.      Pelvic exam was performed with patient in the lithotomy position.   The external female genitalia was normal.   Genitalia hair distrobution normal .   Cervix is normal. Vagina was moist.   pap smear completedUterus consistancy normal and Uerus contour normal            Musculoskeletal: Normal range of motion and moves all extremeties.       Neurological: She is alert and oriented to person, place, and time.    Skin: Skin is warm and dry.    Psychiatric: She has a normal mood and affect.          ASSESSMENT AND PLAN:  Alejandrina Serrano Bladimir 58 y.o.   Alejandrina was seen today for annual exam.    Diagnoses and all orders for this visit:    Well woman exam with routine gynecological exam  -     HPV High Risk Genotypes, PCR  -     Liquid-Based Pap Smear, Screening    Pap smear for cervical cancer screening  -     HPV High Risk Genotypes, PCR  -     Liquid-Based Pap Smear, Screening    Annual physical exam  -     Ambulatory referral/consult to Obstetrics / Gynecology        Cervical Cancer screening: f/u results of PAP / HPV --> if both negative then next screen in 5 yrs  Recommend periodic pelvic exams with visual inspection and palpation  HPV Vaccine: n/a (>45)    Mammogram: ordered and patient will schedule  Encouraged self breast awareness; RTC for breast concerns    RTC for periodic GYN exam, sooner clarisa Sapp  FNP    Future Appointments       Date Provider Specialty Appt Notes    1/7/2025 Niranjan Ayala, PharmD Internal Medicine  Arrive at: Telehealth Weight loss            Tests to Keep You Healthy    Mammogram: ORDERED BUT NOT SCHEDULED  Colon Cancer Screening: Met on 9/30/2022  Cervical Cancer Screening: Met on 7/22/2021  Last Blood Pressure <= 139/89 (2/16/2024): Yes

## 2024-08-30 LAB
HPV HR 12 DNA SPEC QL NAA+PROBE: NEGATIVE
HPV16 AG SPEC QL: NEGATIVE
HPV18 DNA SPEC QL NAA+PROBE: NEGATIVE

## 2024-09-04 DIAGNOSIS — B37.31 YEAST VAGINITIS: Primary | ICD-10-CM

## 2024-09-04 DIAGNOSIS — B96.89 BV (BACTERIAL VAGINOSIS): ICD-10-CM

## 2024-09-04 DIAGNOSIS — N76.0 BV (BACTERIAL VAGINOSIS): ICD-10-CM

## 2024-09-04 RX ORDER — METRONIDAZOLE 7.5 MG/G
1 GEL VAGINAL NIGHTLY
Qty: 70 G | Refills: 0 | Status: SHIPPED | OUTPATIENT
Start: 2024-09-04 | End: 2024-09-10

## 2024-09-04 RX ORDER — FLUCONAZOLE 150 MG/1
150 TABLET ORAL DAILY
Qty: 1 TABLET | Refills: 0 | Status: SHIPPED | OUTPATIENT
Start: 2024-09-04 | End: 2024-09-06

## 2024-09-27 ENCOUNTER — PATIENT MESSAGE (OUTPATIENT)
Dept: SURGERY | Facility: CLINIC | Age: 59
End: 2024-09-27
Payer: COMMERCIAL

## 2024-09-27 ENCOUNTER — OFFICE VISIT (OUTPATIENT)
Dept: PRIMARY CARE CLINIC | Facility: CLINIC | Age: 59
End: 2024-09-27
Payer: COMMERCIAL

## 2024-09-27 VITALS
SYSTOLIC BLOOD PRESSURE: 122 MMHG | RESPIRATION RATE: 16 BRPM | BODY MASS INDEX: 36.47 KG/M2 | HEIGHT: 67 IN | OXYGEN SATURATION: 100 % | WEIGHT: 232.38 LBS | DIASTOLIC BLOOD PRESSURE: 82 MMHG | HEART RATE: 72 BPM

## 2024-09-27 DIAGNOSIS — I10 ESSENTIAL HYPERTENSION: ICD-10-CM

## 2024-09-27 DIAGNOSIS — K21.9 GASTROESOPHAGEAL REFLUX DISEASE WITHOUT ESOPHAGITIS: ICD-10-CM

## 2024-09-27 DIAGNOSIS — K80.20 CALCULUS OF GALLBLADDER WITHOUT CHOLECYSTITIS WITHOUT OBSTRUCTION: ICD-10-CM

## 2024-09-27 DIAGNOSIS — J01.00 ACUTE NON-RECURRENT MAXILLARY SINUSITIS: Primary | ICD-10-CM

## 2024-09-27 PROCEDURE — 99999 PR PBB SHADOW E&M-EST. PATIENT-LVL III: CPT | Mod: PBBFAC,,, | Performed by: NURSE PRACTITIONER

## 2024-09-27 RX ORDER — AZITHROMYCIN 250 MG/1
TABLET, FILM COATED ORAL
Qty: 6 TABLET | Refills: 0 | Status: SHIPPED | OUTPATIENT
Start: 2024-09-27 | End: 2024-10-02

## 2024-09-27 RX ORDER — PREDNISONE 10 MG/1
10 TABLET ORAL DAILY
Qty: 4 TABLET | Refills: 0 | Status: SHIPPED | OUTPATIENT
Start: 2024-09-27 | End: 2024-10-01

## 2024-09-27 NOTE — PROGRESS NOTES
"[facility] Note    Chief Complaint      Chief Complaint   Patient presents with    Headache    Sinusitis     History of Present Illness      Alejandrina Garrison is a 58 y.o. female patient of Dr. Martinez with chronic conditions of HTN, obesity, GERD, cholelithiasis who presents today for head congestion, sinus pressure, pnd over the past week.  No fever, chills, sob, cp, n/v/d    History of Present Illness    CHIEF COMPLAINT:  Alejandrina presents today for sinus problems and gallbladder pain.    SINUS ISSUES:  She reports experiencing sinus problems with pressure, dizziness, and exacerbation of tinnitus described as "on overdrive." She notes ear fullness, redness, and nose inflammation. She expresses concern about taking sinus medication due to its potential effects on her blood pressure, but acknowledges the need to address the sinus problems. Previously prescribed Astelin and Flonase have not been effective in managing her current symptoms.    GALLBLADDER ISSUES:  She reports having a gallbladder full of stones, causing various symptoms including pain that mimics heart attack symptoms (chest pain and shoulder pain), and an indigestion-like feeling in her chest. These symptoms are relieved by taking Mylanta and applying a heat pad. She also reports constipation related to her gallbladder condition and expresses concern about not eating enough, which she believes may be exacerbating her gallbladder symptoms. She has scheduled an appointment with a general surgeon, Dr. Beebe, to address this issue.    PAST MEDICAL HISTORY:  She reports a previous COVID-19 infection. She recently visited the emergency room due to suspected heart attack symptoms, where an ultrasound revealed gallstones as the cause of her chest pain and shoulder discomfort.    ALLERGIES:  She denies any known allergies.    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      ROS:  ENT: +sinus pressure  Cardiovascular: +chest pain  Gastrointestinal: +constipation  Neurological: +dizziness         Health Maintenance   Topic Date Due    TETANUS VACCINE  Never done    Shingles Vaccine (1 of 2) Never done    Mammogram  2023    Lipid Panel  2029    Colorectal Cancer Screening  2032    Hepatitis C Screening  Completed       Past Medical History:   Diagnosis Date    Essential (primary) hypertension     Hypertension        Past Surgical History:   Procedure Laterality Date     SECTION  1988    x3, , ,     COLONOSCOPY N/A 2022    Procedure: COLONOSCOPY;  Surgeon: Tena Hall MD;  Location: [facility] ENDO;  Service: Endoscopy;  Laterality: N/A;    DILATION AND CURETTAGE OF UTERUS          ESOPHAGOGASTRODUODENOSCOPY N/A 2023    Procedure: EGD (ESOPHAGOGASTRODUODENOSCOPY);  Surgeon: Govind James MD;  Location: [facility] ENDOSCOPY;  Service: Endoscopy;  Laterality: N/A;   instruc sent via email  [facility]   Pre call attempted. Left VM    right tubal       SALPINGOOPHORECTOMY      left    TUBAL LIGATION  2008       family history includes Arthritis in her paternal grandmother; Cancer in her father; Colon cancer in her maternal grandmother; Hypertension in her mother and sister; Leukemia in her father; Osteoporosis in her mother.    Social History     Tobacco Use    Smoking status: Never    Smokeless tobacco: Never   Substance Use Topics    Alcohol use: Not Currently     Alcohol/week: 1.0 standard drink of alcohol     Comment: Social drink, glass of wine if eating out    Drug use: Never       Outpatient Encounter Medications as of 2024   Medication Sig Dispense Refill    azelastine (ASTELIN) 137 mcg (0.1 %) nasal spray 1 spray (137 mcg total) by Nasal route 2 (two) times daily. 30 mL 0    fluticasone propionate (FLONASE) 50 mcg/actuation nasal spray  "1 spray (50 mcg total) by Each Nostril route once daily. 16 g 0    olmesartan-hydrochlorothiazide (BENICAR HCT) 40-25 mg per tablet Take 1 tablet by mouth once daily. 90 tablet 2    azithromycin (Z-MARLYN) 250 MG tablet Take 2 tablets by mouth on day 1; Take 1 tablet by mouth on days 2-5 6 tablet 0    predniSONE (DELTASONE) 10 MG tablet Take 1 tablet (10 mg total) by mouth once daily. for 4 days 4 tablet 0    [DISCONTINUED] olmesartan (BENICAR) 40 MG tablet Take 1 tablet by mouth once daily 30 tablet 0     No facility-administered encounter medications on file as of 9/27/2024.        Review of patient's allergies indicates:   Allergen Reactions    Morphine Anaphylaxis, Hives, Itching and Shortness Of Breath    Opioids - morphine analogues Hives and Swelling     Throat swelling    Compazine [prochlorperazine]     Compazine [prochlorperazine] Nausea And Vomiting       Physical Exam      Vital Signs  Pulse: 72  Resp: 16  SpO2: 100 %  BP: 122/82  BP Location: Right arm  Patient Position: Sitting  Height and Weight  Height: 5' 7" (170.2 cm)  Weight: 105.4 kg (232 lb 5.8 oz)  BSA (Calculated - sq m): 2.23 sq meters  BMI (Calculated): 36.4  Weight in (lb) to have BMI = 25: 159.3    Physical Exam  Vitals and nursing note reviewed.   Constitutional:       General: She is not in acute distress.     Appearance: Normal appearance. She is well-developed. She is not ill-appearing.   HENT:      Head: Normocephalic and atraumatic.      Right Ear: External ear normal.      Left Ear: External ear normal.      Ears:      Comments: Redness to shell ear canals with clear effusion     Mouth/Throat:      Mouth: Mucous membranes are moist.      Pharynx: Posterior oropharyngeal erythema present.   Eyes:      Conjunctiva/sclera: Conjunctivae normal.      Pupils: Pupils are equal, round, and reactive to light.   Neck:      Thyroid: No thyromegaly.      Vascular: No JVD.      Trachea: No tracheal deviation.   Cardiovascular:      Rate and Rhythm: " "Normal rate and regular rhythm.      Heart sounds: Normal heart sounds. No murmur heard.  Pulmonary:      Effort: Pulmonary effort is normal.      Breath sounds: Normal breath sounds.   Chest:      Chest wall: No tenderness.   Abdominal:      General: Bowel sounds are normal.      Palpations: Abdomen is soft.      Tenderness: There is no abdominal tenderness. There is no guarding.   Musculoskeletal:         General: Normal range of motion.      Cervical back: Normal range of motion and neck supple.   Lymphadenopathy:      Cervical: Cervical adenopathy present.   Skin:     General: Skin is warm and dry.   Neurological:      General: No focal deficit present.      Mental Status: She is alert and oriented to person, place, and time.   Psychiatric:         Mood and Affect: Mood normal.         Behavior: Behavior normal.         Thought Content: Thought content normal.         Judgment: Judgment normal.          Laboratory:  CBC:  Lab Results   Component Value Date    WBC 5.25 02/19/2024    RBC 5.42 (H) 02/19/2024    HGB 13.5 02/19/2024    HCT 44.5 02/19/2024     02/19/2024    MCV 82 02/19/2024    MCH 24.9 (L) 02/19/2024    MCHC 30.3 (L) 02/19/2024    MCHC 30.9 (L) 07/13/2023    MCHC 31.4 (L) 01/12/2023     CMP:  Lab Results   Component Value Date    GLU 97 02/24/2024    CALCIUM 9.6 02/24/2024    ALBUMIN 4.1 02/24/2024    PROT 7.6 02/24/2024     02/24/2024    K 4.1 02/24/2024    CO2 27 02/24/2024     02/24/2024    BUN 15 02/24/2024    ALKPHOS 134 02/24/2024    ALT 89 (H) 02/24/2024    AST 19 02/24/2024    BILITOT 0.4 02/24/2024    BILITOT 0.5 02/19/2024    BILITOT 0.3 07/13/2023     URINALYSIS:  No results found for: "COLORU", "CLARITYU", "SPECGRAV", "PHUR", "PROTEINUA", "GLUCOSEU", "BILIRUBINCON", "BLOODU", "WBCU", "RBCU", "BACTERIA", "MUCUS", "NITRITE", "LEUKOCYTESUR", "UROBILINOGEN", "HYALINECASTS"   LIPIDS:  Lab Results   Component Value Date    TSH 2.194 02/19/2024    TSH 1.840 01/12/2023    TSH " 1.781 01/11/2022    HDL 88 (H) 02/19/2024    HDL 79 (H) 01/12/2023    HDL 78 (H) 08/02/2021    CHOL 149 02/19/2024    CHOL 127 01/12/2023    CHOL 128 08/02/2021    TRIG 41 02/19/2024    TRIG 49 01/12/2023    TRIG 47 08/02/2021    LDLCALC 52.8 (L) 02/19/2024    LDLCALC 38.2 (L) 01/12/2023    LDLCALC 40.6 (L) 08/02/2021    CHOLHDL 59.1 (H) 02/19/2024    CHOLHDL 62.2 (H) 01/12/2023    CHOLHDL 60.9 (H) 08/02/2021    NONHDLCHOL 61 02/19/2024    NONHDLCHOL 48 01/12/2023    NONHDLCHOL 50 08/02/2021    TOTALCHOLEST 1.7 (L) 02/19/2024    TOTALCHOLEST 1.6 (L) 01/12/2023    TOTALCHOLEST 1.6 (L) 08/02/2021     TSH:  Lab Results   Component Value Date    TSH 2.194 02/19/2024    TSH 1.840 01/12/2023    TSH 1.781 01/11/2022     A1C:  Lab Results   Component Value Date    HGBA1C 5.4 02/19/2024    HGBA1C 5.3 01/12/2023    HGBA1C 5.2 08/10/2021         Assessment/Plan     Alejandrina Garrison is a 58 y.o.female with:    Assessment & Plan    Assessed sinus symptoms, including ear fullness, dizziness, and pressure, likely exacerbated by recent weather changes  Evaluated concerns about gallbladder issues, distinguishing between cardiac and gallbladder-related symptoms  Determined need for antibiotic and short-term steroid treatment to address sinus infection and reduce inflammation  Considered history of blood pressure concerns when selecting appropriate medications    SINUS INFECTION:  - Explained that sinus infections can progress to more serious conditions like bronchitis or pneumonia if left untreated.  - Alejandrina to use saline nasal spray to help thin mucus and flush sinuses.  - Alejandrina to gargle with warm salt water to alleviate throat irritation from postnasal drip.  - Recommend staying well-hydrated.  - Started azithromycin (Z-Reese) for 5 days.    GALLBLADDER ISSUES:  - Clarified that symptoms relieved by GI medicines are likely related to gallbladder issues, not cardiac problems.    HEADACHE:  - Discussed that steroid treatment will  help reduce inflammation, potentially alleviating headache symptoms.  - Started prednisone 10 mg, take daily in the morning for 4 days.    ALLERGIES:  - Started OTC Zyrtec as needed for allergy symptoms.    MEDICATIONS/SUPPLEMENTS:  - Informed about potential side effects of steroid treatment, including jitteriness and sweating.    FOLLOW UP:  - Follow up next week for follow-up.         Acute non-recurrent maxillary sinusitis  -     predniSONE (DELTASONE) 10 MG tablet; Take 1 tablet (10 mg total) by mouth once daily. for 4 days  Dispense: 4 tablet; Refill: 0  -     azithromycin (Z-MARLYN) 250 MG tablet; Take 2 tablets by mouth on day 1; Take 1 tablet by mouth on days 2-5  Dispense: 6 tablet; Refill: 0    Essential hypertension    Calculus of gallbladder without cholecystitis without obstruction    Gastroesophageal reflux disease without esophagitis          Health Maintenance Due   Topic Date Due    TETANUS VACCINE  Never done    Shingles Vaccine (1 of 2) Never done    Mammogram  08/25/2023    Influenza Vaccine (1) 09/01/2024    COVID-19 Vaccine (3 - 2023-24 season) 09/01/2024        I spent 39 minutes on the day of this encounter for preparing for, evaluating, treating, and managing this patient.      -Continue current medications and maintain follow up with specialists.  Return to clinic as needed for any concerns   No follow-ups on file.     This note was generated with the assistance of ambient listening technology. Verbal consent was obtained by the patient and accompanying visitor(s) for the recording of patient appointment to facilitate this note. I attest to having reviewed and edited the generated note for accuracy, though some syntax or spelling errors may persist. Please contact the author of this note for any clarification.        LILIAN Camargo  [facility]

## 2024-09-27 NOTE — PROGRESS NOTES
[facility]    Chief Complaint      Chief Complaint   Patient presents with    Headache    Sinusitis     History of Present Illness      Alejandrina Garrison is a 58 y.o. female patient of Dr. Martinez with chronic conditions of *** who presents today for ***.    Health Maintenance   Topic Date Due    TETANUS VACCINE  Never done    Shingles Vaccine (1 of 2) Never done    Mammogram  2023    Lipid Panel  2029    Colorectal Cancer Screening  2032    Hepatitis C Screening  Completed       Past Medical History:   Diagnosis Date    Essential (primary) hypertension     Hypertension        Past Surgical History:   Procedure Laterality Date     SECTION  1988    x3, , ,     COLONOSCOPY N/A 2022    Procedure: COLONOSCOPY;  Surgeon: Tena Hall MD;  Location: [facility] ENDO;  Service: Endoscopy;  Laterality: N/A;    DILATION AND CURETTAGE OF UTERUS          ESOPHAGOGASTRODUODENOSCOPY N/A 2023    Procedure: EGD (ESOPHAGOGASTRODUODENOSCOPY);  Surgeon: Govind James MD;  Location: [facility] ENDOSCOPY;  Service: Endoscopy;  Laterality: N/A;   instruc sent via email  [facility]   Pre call attempted. Left VM    right tubal       SALPINGOOPHORECTOMY      left    TUBAL LIGATION  2008       family history includes Arthritis in her paternal grandmother; Cancer in her father; Colon cancer in her maternal grandmother; Hypertension in her mother and sister; Leukemia in her father; Osteoporosis in her mother.    Social History     Tobacco Use    Smoking status: Never    Smokeless tobacco: Never   Substance Use Topics    Alcohol use: Not Currently     Alcohol/week: 1.0 standard drink of alcohol     Comment: Social drink, glass of wine if eating out    Drug use: Never       ROS     Outpatient Encounter Medications as of 2024   Medication Sig Dispense Refill    azelastine (ASTELIN) 137 mcg (0.1 %) nasal spray 1 spray (137 mcg total) by Nasal route 2 (two)  "times daily. 30 mL 0    fluticasone propionate (FLONASE) 50 mcg/actuation nasal spray 1 spray (50 mcg total) by Each Nostril route once daily. 16 g 0    olmesartan-hydrochlorothiazide (BENICAR HCT) 40-25 mg per tablet Take 1 tablet by mouth once daily. 90 tablet 2    azithromycin (Z-MARLYN) 250 MG tablet Take 2 tablets by mouth on day 1; Take 1 tablet by mouth on days 2-5 6 tablet 0    predniSONE (DELTASONE) 10 MG tablet Take 1 tablet (10 mg total) by mouth once daily. for 4 days 4 tablet 0    [DISCONTINUED] olmesartan (BENICAR) 40 MG tablet Take 1 tablet by mouth once daily 30 tablet 0     No facility-administered encounter medications on file as of 9/27/2024.        Review of patient's allergies indicates:   Allergen Reactions    Morphine Anaphylaxis, Hives, Itching and Shortness Of Breath    Opioids - morphine analogues Hives and Swelling     Throat swelling    Compazine [prochlorperazine]     Compazine [prochlorperazine] Nausea And Vomiting       Physical Exam      Vital Signs  Pulse: 72  Resp: 16  SpO2: 100 %  BP: 122/82  BP Location: Right arm  Patient Position: Sitting  Height and Weight  Height: 5' 7" (170.2 cm)  Weight: 105.4 kg (232 lb 5.8 oz)  BSA (Calculated - sq m): 2.23 sq meters  BMI (Calculated): 36.4  Weight in (lb) to have BMI = 25: 159.3    Physical Exam     Laboratory:  CBC:  Lab Results   Component Value Date    WBC 5.25 02/19/2024    RBC 5.42 (H) 02/19/2024    HGB 13.5 02/19/2024    HCT 44.5 02/19/2024     02/19/2024    MCV 82 02/19/2024    MCH 24.9 (L) 02/19/2024    MCHC 30.3 (L) 02/19/2024    MCHC 30.9 (L) 07/13/2023    MCHC 31.4 (L) 01/12/2023     CMP:  Lab Results   Component Value Date    GLU 97 02/24/2024    CALCIUM 9.6 02/24/2024    ALBUMIN 4.1 02/24/2024    PROT 7.6 02/24/2024     02/24/2024    K 4.1 02/24/2024    CO2 27 02/24/2024     02/24/2024    BUN 15 02/24/2024    ALKPHOS 134 02/24/2024    ALT 89 (H) 02/24/2024    AST 19 02/24/2024    BILITOT 0.4 02/24/2024    " "BILITOT 0.5 02/19/2024    BILITOT 0.3 07/13/2023     URINALYSIS:  No results found for: "COLORU", "CLARITYU", "SPECGRAV", "PHUR", "PROTEINUA", "GLUCOSEU", "BILIRUBINCON", "BLOODU", "WBCU", "RBCU", "BACTERIA", "MUCUS", "NITRITE", "LEUKOCYTESUR", "UROBILINOGEN", "HYALINECASTS"   LIPIDS:  Lab Results   Component Value Date    TSH 2.194 02/19/2024    TSH 1.840 01/12/2023    TSH 1.781 01/11/2022    HDL 88 (H) 02/19/2024    HDL 79 (H) 01/12/2023    HDL 78 (H) 08/02/2021    CHOL 149 02/19/2024    CHOL 127 01/12/2023    CHOL 128 08/02/2021    TRIG 41 02/19/2024    TRIG 49 01/12/2023    TRIG 47 08/02/2021    LDLCALC 52.8 (L) 02/19/2024    LDLCALC 38.2 (L) 01/12/2023    LDLCALC 40.6 (L) 08/02/2021    CHOLHDL 59.1 (H) 02/19/2024    CHOLHDL 62.2 (H) 01/12/2023    CHOLHDL 60.9 (H) 08/02/2021    NONHDLCHOL 61 02/19/2024    NONHDLCHOL 48 01/12/2023    NONHDLCHOL 50 08/02/2021    TOTALCHOLEST 1.7 (L) 02/19/2024    TOTALCHOLEST 1.6 (L) 01/12/2023    TOTALCHOLEST 1.6 (L) 08/02/2021     TSH:  Lab Results   Component Value Date    TSH 2.194 02/19/2024    TSH 1.840 01/12/2023    TSH 1.781 01/11/2022     A1C:  Lab Results   Component Value Date    HGBA1C 5.4 02/19/2024    HGBA1C 5.3 01/12/2023    HGBA1C 5.2 08/10/2021         Assessment/Plan     Alejandrina Garrison is a 58 y.o.female with:    Calculus of gallbladder without cholecystitis without obstruction    Acute non-recurrent maxillary sinusitis  -     predniSONE (DELTASONE) 10 MG tablet; Take 1 tablet (10 mg total) by mouth once daily. for 4 days  Dispense: 4 tablet; Refill: 0  -     azithromycin (Z-MARLYN) 250 MG tablet; Take 2 tablets by mouth on day 1; Take 1 tablet by mouth on days 2-5  Dispense: 6 tablet; Refill: 0          Health Maintenance Due   Topic Date Due    TETANUS VACCINE  Never done    Shingles Vaccine (1 of 2) Never done    Mammogram  08/25/2023    Influenza Vaccine (1) 09/01/2024    COVID-19 Vaccine (3 - 2023-24 season) 09/01/2024        I spent *** minutes on the " day of this encounter for preparing for, evaluating, treating, and managing this patient.      -Continue current medications and maintain follow up with specialists.  Return to clinic as needed for any concerns   No follow-ups on file.       LINCOLN CamargoC  [facility]

## 2024-09-30 ENCOUNTER — PATIENT MESSAGE (OUTPATIENT)
Dept: SURGERY | Facility: CLINIC | Age: 59
End: 2024-09-30
Payer: COMMERCIAL

## 2024-10-01 ENCOUNTER — TELEPHONE (OUTPATIENT)
Dept: SURGERY | Facility: CLINIC | Age: 59
End: 2024-10-01
Payer: COMMERCIAL

## 2024-10-01 DIAGNOSIS — K80.20 CALCULUS OF GALLBLADDER WITHOUT CHOLECYSTITIS WITHOUT OBSTRUCTION: Primary | ICD-10-CM

## 2024-10-01 DIAGNOSIS — K80.20 SYMPTOMATIC CHOLELITHIASIS: ICD-10-CM

## 2024-10-01 DIAGNOSIS — K82.9 GALLBLADDER PROBLEM: ICD-10-CM

## 2024-10-01 NOTE — TELEPHONE ENCOUNTER
----- Message from Susanna sent at 10/1/2024  8:50 AM CDT -----  Contact: Patient  Type:  Needs Medical Advice    Who Called:  Patient    Would the patient rather a call back or a response via [facility]?   Call back  Best Call Back Number:   [phone]    Additional Information:   States she would like to speak with either Maggie or Nina regarding her surgery on 10/8 - states she has questions - please call - thank you

## 2024-10-02 ENCOUNTER — LAB VISIT (OUTPATIENT)
Dept: LAB | Facility: HOSPITAL | Age: 59
End: 2024-10-02
Attending: SURGERY
Payer: COMMERCIAL

## 2024-10-02 DIAGNOSIS — K82.9 GALLBLADDER PROBLEM: ICD-10-CM

## 2024-10-02 DIAGNOSIS — K80.20 CALCULUS OF GALLBLADDER WITHOUT CHOLECYSTITIS WITHOUT OBSTRUCTION: ICD-10-CM

## 2024-10-02 LAB
ALBUMIN SERPL BCP-MCNC: 4 G/DL (ref 3.5–5.2)
ALP SERPL-CCNC: 74 U/L (ref 55–135)
ALT SERPL W/O P-5'-P-CCNC: 22 U/L (ref 10–44)
ANION GAP SERPL CALC-SCNC: 6 MMOL/L (ref 8–16)
AST SERPL-CCNC: 13 U/L (ref 10–40)
BASOPHILS # BLD AUTO: 0.06 K/UL (ref 0–0.2)
BASOPHILS NFR BLD: 0.9 % (ref 0–1.9)
BILIRUB SERPL-MCNC: 0.3 MG/DL (ref 0.1–1)
BUN SERPL-MCNC: 16 MG/DL (ref 6–20)
CALCIUM SERPL-MCNC: 9.5 MG/DL (ref 8.7–10.5)
CHLORIDE SERPL-SCNC: 107 MMOL/L (ref 95–110)
CO2 SERPL-SCNC: 29 MMOL/L (ref 23–29)
CREAT SERPL-MCNC: 0.9 MG/DL (ref 0.5–1.4)
DIFFERENTIAL METHOD BLD: ABNORMAL
EOSINOPHIL # BLD AUTO: 0.1 K/UL (ref 0–0.5)
EOSINOPHIL NFR BLD: 2 % (ref 0–8)
ERYTHROCYTE [DISTWIDTH] IN BLOOD BY AUTOMATED COUNT: 14.6 % (ref 11.5–14.5)
EST. GFR  (NO RACE VARIABLE): >60 ML/MIN/1.73 M^2
GLUCOSE SERPL-MCNC: 92 MG/DL (ref 70–110)
HCT VFR BLD AUTO: 43.1 % (ref 37–48.5)
HGB BLD-MCNC: 13.3 G/DL (ref 12–16)
IMM GRANULOCYTES # BLD AUTO: 0.01 K/UL (ref 0–0.04)
IMM GRANULOCYTES NFR BLD AUTO: 0.1 % (ref 0–0.5)
LYMPHOCYTES # BLD AUTO: 2.5 K/UL (ref 1–4.8)
LYMPHOCYTES NFR BLD: 36.5 % (ref 18–48)
MCH RBC QN AUTO: 25.8 PG (ref 27–31)
MCHC RBC AUTO-ENTMCNC: 30.9 G/DL (ref 32–36)
MCV RBC AUTO: 84 FL (ref 82–98)
MONOCYTES # BLD AUTO: 0.3 K/UL (ref 0.3–1)
MONOCYTES NFR BLD: 4.9 % (ref 4–15)
NEUTROPHILS # BLD AUTO: 3.9 K/UL (ref 1.8–7.7)
NEUTROPHILS NFR BLD: 55.6 % (ref 38–73)
NRBC BLD-RTO: 0 /100 WBC
PLATELET # BLD AUTO: 327 K/UL (ref 150–450)
PMV BLD AUTO: 11.2 FL (ref 9.2–12.9)
POTASSIUM SERPL-SCNC: 3.7 MMOL/L (ref 3.5–5.1)
PROT SERPL-MCNC: 7.4 G/DL (ref 6–8.4)
RBC # BLD AUTO: 5.15 M/UL (ref 4–5.4)
SODIUM SERPL-SCNC: 142 MMOL/L (ref 136–145)
WBC # BLD AUTO: 6.96 K/UL (ref 3.9–12.7)

## 2024-10-02 PROCEDURE — 80053 COMPREHEN METABOLIC PANEL: CPT | Performed by: SURGERY

## 2024-10-02 PROCEDURE — 36415 COLL VENOUS BLD VENIPUNCTURE: CPT | Performed by: SURGERY

## 2024-10-02 PROCEDURE — 85025 COMPLETE CBC W/AUTO DIFF WBC: CPT | Performed by: SURGERY

## 2024-10-03 ENCOUNTER — OFFICE VISIT (OUTPATIENT)
Dept: OPTOMETRY | Facility: CLINIC | Age: 59
End: 2024-10-03
Payer: COMMERCIAL

## 2024-10-03 DIAGNOSIS — H52.4 PRESBYOPIA OF BOTH EYES: Primary | ICD-10-CM

## 2024-10-03 DIAGNOSIS — Z97.3 WEARS CONTACT LENSES: ICD-10-CM

## 2024-10-03 DIAGNOSIS — Z46.0 FITTING AND ADJUSTMENT OF SPECTACLES AND CONTACT LENSES: Primary | ICD-10-CM

## 2024-10-03 PROCEDURE — 99999 PR PBB SHADOW E&M-EST. PATIENT-LVL II: CPT | Mod: PBBFAC,,, | Performed by: OPTOMETRIST

## 2024-10-03 PROCEDURE — 92015 DETERMINE REFRACTIVE STATE: CPT | Mod: ,,, | Performed by: OPTOMETRIST

## 2024-10-03 PROCEDURE — 99999 PR PBB SHADOW E&M-EST. PATIENT-LVL I: CPT | Mod: PBBFAC,,, | Performed by: OPTOMETRIST

## 2024-10-03 PROCEDURE — 92014 COMPRE OPH EXAM EST PT 1/>: CPT | Mod: ,,, | Performed by: OPTOMETRIST

## 2024-10-03 PROCEDURE — 99499 UNLISTED E&M SERVICE: CPT | Mod: S$GLB,,, | Performed by: OPTOMETRIST

## 2024-10-03 PROCEDURE — 92310 CONTACT LENS FITTING OU: CPT | Mod: CSM,,, | Performed by: OPTOMETRIST

## 2024-10-07 ENCOUNTER — TELEPHONE (OUTPATIENT)
Dept: SURGERY | Facility: CLINIC | Age: 59
End: 2024-10-07
Payer: COMMERCIAL

## 2024-10-07 ENCOUNTER — PATIENT MESSAGE (OUTPATIENT)
Dept: OPTOMETRY | Facility: CLINIC | Age: 59
End: 2024-10-07
Payer: COMMERCIAL

## 2024-10-07 ENCOUNTER — ANESTHESIA EVENT (OUTPATIENT)
Dept: SURGERY | Facility: HOSPITAL | Age: 59
End: 2024-10-07
Payer: COMMERCIAL

## 2024-10-07 NOTE — PROGRESS NOTES
HPI    Pt is here today for routine eye exam and cl fit. Denies pain/discomfort.   Patient states that she has worn contacts in the past but will need a   refresher.   DLS: 2/10/2023 Dr. Barrientos   (-)Flashes   (-)Floaters   (-)Diplopia   (-)Headaches   (-)Itching   (-)Tearing  (-)Burning  (-)Dryness   (-)Photophobia  (-)Glare   (-)Blurred VA  Past Eye Sx: (-)  Eye Meds: (-)   Last edited by Yasmine Anguiano, OD on 10/3/2024  2:26 PM.            Assessment /Plan     For exam results, see Encounter Report.    Presbyopia of both eyes    Wears contact lenses      1.    Eyeglass Final Rx       Eyeglass Final Rx         Sphere Cylinder Axis Add    Right +1.00 +0.50 145 +2.00    Left +1.50 Sphere  +2.00      Type: PAL    Expiration Date: 10/3/2025                   2. Updated CL Rx. Initially good comfort and vision. Given CL trials to take home. If happy with comfort and vision of trial lenses, may order annual supply. If issues arise, RTC for CL f/u. Reviewed proper CL care and hygiene. Monitor yearly unless changes noted sooner.      Contact Lens Current Rx       Current Contact Lens Rx (Trial Lens, Dispensed)         Brand Base Curve Diameter Sphere Cylinder Centration Movement    Right Precision1 8.3 14.2 +1.25 Sphere Well-centered Adequate    Left Precision 1 8.3 14.2 +3.50 Sphere Well-centered Adequate                   RTC in 1 year for annual eye exam unless changes noted sooner.

## 2024-10-07 NOTE — TELEPHONE ENCOUNTER
----- Message from Jessie sent at 10/7/2024  1:21 PM CDT -----  Contact: Patient  Type:  Needs Medical Advice    Who Called: Patient      Would the patient rather a call back or a response via [facility]? Call    Best Call Back Number: [phone] (home)     Additional Information: Patient is requesting to be called by the nurse.

## 2024-10-07 NOTE — TELEPHONE ENCOUNTER
Pt is asking how long she will be out of work after surgery, advised after gallbladder it is about 2 weeks.  Also asking if we received her FMLA papers.  Advised I will check with Mayte.

## 2024-10-08 ENCOUNTER — ANESTHESIA (OUTPATIENT)
Dept: SURGERY | Facility: HOSPITAL | Age: 59
End: 2024-10-08
Payer: COMMERCIAL

## 2024-10-08 ENCOUNTER — HOSPITAL ENCOUNTER (OUTPATIENT)
Facility: HOSPITAL | Age: 59
Discharge: HOME | End: 2024-10-08
Attending: SURGERY | Admitting: SURGERY
Payer: COMMERCIAL

## 2024-10-08 DIAGNOSIS — K80.20 CALCULUS OF GALLBLADDER WITHOUT CHOLECYSTITIS WITHOUT OBSTRUCTION: ICD-10-CM

## 2024-10-08 DIAGNOSIS — K82.9 GALLBLADDER PROBLEM: ICD-10-CM

## 2024-10-08 DIAGNOSIS — Z01.818 PREOP TESTING: ICD-10-CM

## 2024-10-08 DIAGNOSIS — K80.20 SYMPTOMATIC CHOLELITHIASIS: Primary | ICD-10-CM

## 2024-10-08 LAB
OHS QRS DURATION: 88 MS
OHS QTC CALCULATION: 421 MS

## 2024-10-08 PROCEDURE — 25000003 PHARM REV CODE 250: Performed by: NURSE ANESTHETIST, CERTIFIED REGISTERED

## 2024-10-08 PROCEDURE — 36000710: Performed by: SURGERY

## 2024-10-08 PROCEDURE — 25000003 PHARM REV CODE 250: Performed by: SURGERY

## 2024-10-08 PROCEDURE — 93005 ELECTROCARDIOGRAM TRACING: CPT

## 2024-10-08 PROCEDURE — 36000711: Performed by: SURGERY

## 2024-10-08 PROCEDURE — 94760 N-INVAS EAR/PLS OXIMETRY 1: CPT

## 2024-10-08 PROCEDURE — 63600175 PHARM REV CODE 636 W HCPCS: Performed by: SURGERY

## 2024-10-08 PROCEDURE — 63600175 PHARM REV CODE 636 W HCPCS: Performed by: NURSE ANESTHETIST, CERTIFIED REGISTERED

## 2024-10-08 PROCEDURE — 37000008 HC ANESTHESIA 1ST 15 MINUTES: Performed by: SURGERY

## 2024-10-08 PROCEDURE — 37000009 HC ANESTHESIA EA ADD 15 MINS: Performed by: SURGERY

## 2024-10-08 PROCEDURE — 27201423 OPTIME MED/SURG SUP & DEVICES STERILE SUPPLY: Performed by: SURGERY

## 2024-10-08 PROCEDURE — 71000033 HC RECOVERY, INTIAL HOUR: Performed by: SURGERY

## 2024-10-08 PROCEDURE — 71000039 HC RECOVERY, EACH ADD'L HOUR: Performed by: SURGERY

## 2024-10-08 PROCEDURE — 71000015 HC POSTOP RECOV 1ST HR: Performed by: SURGERY

## 2024-10-08 PROCEDURE — 93010 ELECTROCARDIOGRAM REPORT: CPT | Mod: ,,, | Performed by: INTERNAL MEDICINE

## 2024-10-08 PROCEDURE — 71000016 HC POSTOP RECOV ADDL HR: Performed by: SURGERY

## 2024-10-08 PROCEDURE — 47562 LAPAROSCOPIC CHOLECYSTECTOMY: CPT | Mod: ,,, | Performed by: SURGERY

## 2024-10-08 PROCEDURE — 94799 UNLISTED PULMONARY SVC/PX: CPT

## 2024-10-08 PROCEDURE — 63600175 PHARM REV CODE 636 W HCPCS: Performed by: ANESTHESIOLOGY

## 2024-10-08 PROCEDURE — 25000003 PHARM REV CODE 250: Performed by: ANESTHESIOLOGY

## 2024-10-08 RX ORDER — MIDAZOLAM HYDROCHLORIDE 1 MG/ML
INJECTION INTRAMUSCULAR; INTRAVENOUS
Status: DISCONTINUED | OUTPATIENT
Start: 2024-10-08 | End: 2024-10-08

## 2024-10-08 RX ORDER — DIPHENHYDRAMINE HYDROCHLORIDE 50 MG/ML
25 INJECTION INTRAMUSCULAR; INTRAVENOUS ONCE AS NEEDED
Status: DISCONTINUED | OUTPATIENT
Start: 2024-10-08 | End: 2024-10-08 | Stop reason: HOSPADM

## 2024-10-08 RX ORDER — HYDROMORPHONE HYDROCHLORIDE 4 MG/1
2 TABLET ORAL EVERY 6 HOURS PRN
Qty: 20 TABLET | Refills: 0 | Status: SHIPPED | OUTPATIENT
Start: 2024-10-08

## 2024-10-08 RX ORDER — FENTANYL CITRATE 50 UG/ML
INJECTION, SOLUTION INTRAMUSCULAR; INTRAVENOUS
Status: DISCONTINUED | OUTPATIENT
Start: 2024-10-08 | End: 2024-10-08

## 2024-10-08 RX ORDER — FENTANYL CITRATE 50 UG/ML
25 INJECTION, SOLUTION INTRAMUSCULAR; INTRAVENOUS EVERY 5 MIN PRN
Status: COMPLETED | OUTPATIENT
Start: 2024-10-08 | End: 2024-10-08

## 2024-10-08 RX ORDER — BUPIVACAINE HYDROCHLORIDE AND EPINEPHRINE 2.5; 5 MG/ML; UG/ML
INJECTION, SOLUTION EPIDURAL; INFILTRATION; INTRACAUDAL; PERINEURAL
Status: DISCONTINUED | OUTPATIENT
Start: 2024-10-08 | End: 2024-10-08 | Stop reason: HOSPADM

## 2024-10-08 RX ORDER — EPHEDRINE SULFATE 50 MG/ML
INJECTION, SOLUTION INTRAVENOUS
Status: DISCONTINUED | OUTPATIENT
Start: 2024-10-08 | End: 2024-10-08

## 2024-10-08 RX ORDER — ONDANSETRON HYDROCHLORIDE 2 MG/ML
INJECTION, SOLUTION INTRAVENOUS
Status: DISCONTINUED | OUTPATIENT
Start: 2024-10-08 | End: 2024-10-08

## 2024-10-08 RX ORDER — ACETAMINOPHEN 10 MG/ML
INJECTION, SOLUTION INTRAVENOUS
Status: DISCONTINUED | OUTPATIENT
Start: 2024-10-08 | End: 2024-10-08

## 2024-10-08 RX ORDER — PROPOFOL 10 MG/ML
VIAL (ML) INTRAVENOUS
Status: DISCONTINUED | OUTPATIENT
Start: 2024-10-08 | End: 2024-10-08

## 2024-10-08 RX ORDER — PHENYLEPHRINE HYDROCHLORIDE 10 MG/ML
INJECTION INTRAVENOUS
Status: DISCONTINUED | OUTPATIENT
Start: 2024-10-08 | End: 2024-10-08

## 2024-10-08 RX ORDER — SUCCINYLCHOLINE CHLORIDE 20 MG/ML
INJECTION INTRAMUSCULAR; INTRAVENOUS
Status: DISCONTINUED | OUTPATIENT
Start: 2024-10-08 | End: 2024-10-08

## 2024-10-08 RX ORDER — LIDOCAINE HYDROCHLORIDE 20 MG/ML
INJECTION INTRAVENOUS
Status: DISCONTINUED | OUTPATIENT
Start: 2024-10-08 | End: 2024-10-08

## 2024-10-08 RX ORDER — LIDOCAINE HYDROCHLORIDE 10 MG/ML
1 INJECTION, SOLUTION EPIDURAL; INFILTRATION; INTRACAUDAL; PERINEURAL ONCE
Status: DISCONTINUED | OUTPATIENT
Start: 2024-10-08 | End: 2024-10-08 | Stop reason: HOSPADM

## 2024-10-08 RX ORDER — HYDROMORPHONE HYDROCHLORIDE 2 MG/ML
1 INJECTION, SOLUTION INTRAMUSCULAR; INTRAVENOUS; SUBCUTANEOUS ONCE
Status: COMPLETED | OUTPATIENT
Start: 2024-10-08 | End: 2024-10-08

## 2024-10-08 RX ORDER — ROCURONIUM BROMIDE 10 MG/ML
INJECTION, SOLUTION INTRAVENOUS
Status: DISCONTINUED | OUTPATIENT
Start: 2024-10-08 | End: 2024-10-08

## 2024-10-08 RX ORDER — KETOROLAC TROMETHAMINE 30 MG/ML
INJECTION, SOLUTION INTRAMUSCULAR; INTRAVENOUS
Status: DISCONTINUED | OUTPATIENT
Start: 2024-10-08 | End: 2024-10-08

## 2024-10-08 RX ORDER — DEXAMETHASONE SODIUM PHOSPHATE 4 MG/ML
INJECTION, SOLUTION INTRA-ARTICULAR; INTRALESIONAL; INTRAMUSCULAR; INTRAVENOUS; SOFT TISSUE
Status: DISCONTINUED | OUTPATIENT
Start: 2024-10-08 | End: 2024-10-08

## 2024-10-08 RX ORDER — METOCLOPRAMIDE HYDROCHLORIDE 5 MG/ML
10 INJECTION INTRAMUSCULAR; INTRAVENOUS EVERY 10 MIN PRN
Status: DISCONTINUED | OUTPATIENT
Start: 2024-10-08 | End: 2024-10-08 | Stop reason: HOSPADM

## 2024-10-08 RX ADMIN — ROCURONIUM BROMIDE 5 MG: 10 INJECTION, SOLUTION INTRAVENOUS at 07:10

## 2024-10-08 RX ADMIN — EPHEDRINE SULFATE 10 MG: 50 INJECTION, SOLUTION INTRAMUSCULAR; INTRAVENOUS; SUBCUTANEOUS at 08:10

## 2024-10-08 RX ADMIN — SUCCINYLCHOLINE CHLORIDE 140 MG: 20 INJECTION, SOLUTION INTRAMUSCULAR; INTRAVENOUS at 07:10

## 2024-10-08 RX ADMIN — SODIUM CHLORIDE, SODIUM GLUCONATE, SODIUM ACETATE, POTASSIUM CHLORIDE AND MAGNESIUM CHLORIDE: 526; 502; 368; 37; 30 INJECTION, SOLUTION INTRAVENOUS at 08:10

## 2024-10-08 RX ADMIN — DEXAMETHASONE SODIUM PHOSPHATE 4 MG: 4 INJECTION, SOLUTION INTRA-ARTICULAR; INTRALESIONAL; INTRAMUSCULAR; INTRAVENOUS; SOFT TISSUE at 07:10

## 2024-10-08 RX ADMIN — CEFOXITIN 2 G: 2 INJECTION, POWDER, FOR SOLUTION INTRAVENOUS at 07:10

## 2024-10-08 RX ADMIN — PHENYLEPHRINE HYDROCHLORIDE 100 MCG: 10 INJECTION INTRAVENOUS at 08:10

## 2024-10-08 RX ADMIN — FENTANYL CITRATE 50 MCG: 50 INJECTION, SOLUTION INTRAMUSCULAR; INTRAVENOUS at 07:10

## 2024-10-08 RX ADMIN — PHENYLEPHRINE HYDROCHLORIDE 100 MCG: 10 INJECTION INTRAVENOUS at 07:10

## 2024-10-08 RX ADMIN — FENTANYL CITRATE 25 MCG: 50 INJECTION INTRAMUSCULAR; INTRAVENOUS at 09:10

## 2024-10-08 RX ADMIN — ROCURONIUM BROMIDE 25 MG: 10 INJECTION, SOLUTION INTRAVENOUS at 07:10

## 2024-10-08 RX ADMIN — ACETAMINOPHEN 1000 MG: 10 INJECTION INTRAVENOUS at 07:10

## 2024-10-08 RX ADMIN — KETOROLAC TROMETHAMINE 30 MG: 30 INJECTION, SOLUTION INTRAMUSCULAR; INTRAVENOUS at 08:10

## 2024-10-08 RX ADMIN — MIDAZOLAM HYDROCHLORIDE 2 MG: 1 INJECTION, SOLUTION INTRAMUSCULAR; INTRAVENOUS at 07:10

## 2024-10-08 RX ADMIN — SODIUM CHLORIDE, SODIUM GLUCONATE, SODIUM ACETATE, POTASSIUM CHLORIDE AND MAGNESIUM CHLORIDE: 526; 502; 368; 37; 30 INJECTION, SOLUTION INTRAVENOUS at 06:10

## 2024-10-08 RX ADMIN — ROCURONIUM BROMIDE 10 MG: 10 INJECTION, SOLUTION INTRAVENOUS at 08:10

## 2024-10-08 RX ADMIN — PROPOFOL 150 MG: 10 INJECTION, EMULSION INTRAVENOUS at 07:10

## 2024-10-08 RX ADMIN — FENTANYL CITRATE 50 MCG: 50 INJECTION, SOLUTION INTRAMUSCULAR; INTRAVENOUS at 09:10

## 2024-10-08 RX ADMIN — SUGAMMADEX 200 MG: 100 INJECTION, SOLUTION INTRAVENOUS at 09:10

## 2024-10-08 RX ADMIN — ONDANSETRON 4 MG: 2 INJECTION INTRAMUSCULAR; INTRAVENOUS at 07:10

## 2024-10-08 RX ADMIN — LIDOCAINE HYDROCHLORIDE 100 MG: 20 INJECTION, SOLUTION INTRAVENOUS at 07:10

## 2024-10-08 RX ADMIN — HYDROMORPHONE HYDROCHLORIDE 1 MG: 2 INJECTION INTRAMUSCULAR; INTRAVENOUS; SUBCUTANEOUS at 10:10

## 2024-10-08 NOTE — TRANSFER OF CARE
"Anesthesia Transfer of Care Note    Patient: Alejandrina Garrison    Procedure(s) Performed: Procedure(s) (LRB):  ROBOTIC CHOLECYSTECTOMY (N/A)    Patient location: PACU    Anesthesia Type: general    Transport from OR: Transported from OR on 6-10 L/min O2 by face mask with adequate spontaneous ventilation    Post pain: adequate analgesia    Post assessment: no apparent anesthetic complications    Post vital signs: stable    Level of consciousness: sedated    Nausea/Vomiting: no nausea/vomiting    Complications: none    Transfer of care protocol was followed    Last vitals: Visit Vitals  BP (!) 142/74 (BP Location: Right arm, Patient Position: Lying)   Pulse 72   Temp 36.7 °C (98.1 °F) (Temporal)   Resp 18   Ht 5' 7" (1.702 m)   Wt 105.2 kg (232 lb)   LMP  (LMP Unknown)   SpO2 99%   Breastfeeding No   BMI 36.34 kg/m²     "

## 2024-10-08 NOTE — H&P
Seen and examined. No interval changes. Has ongoing symptoms. To OR for cholecysectomy.    Joon BACH [facility]    GENERAL SURGERY  OUTPATIENT H&P     REASON FOR VISIT/CC: Gallstones     HPI: Alejandrina Garrison is a 58 y.o. female with known history of gallstones who presents for evaluation. Patient was history of right upper quadrant abdominal discomfort often with eating.  Sometimes associated with nausea.  No fevers or chills, yellowing of the skin or eyes.  He was previously diagnosed with gallstones. Had been evaluated by different surgeon however surgery was never completed.  Was referred for evaluation by PCP. Has history of C-sections and tubal ligation but no other intra-abdominal surgeries.     I have reviewed the patient's chart including prior progress notes, procedures and testing.      ROS:   Review of Systems     PROBLEM LIST:  Problem List       Patient Active Problem List   Diagnosis    Essential hypertension    Class 2 obesity due to excess calories without serious comorbidity with body mass index (BMI) of 37.0 to 37.9 in adult    Palpitations    Gastroesophageal reflux disease without esophagitis               HISTORY       Past Medical History:   Diagnosis Date    Essential (primary) hypertension      Hypertension                 Past Surgical History:   Procedure Laterality Date     SECTION   1988     x3, 1998, ,     COLONOSCOPY N/A 2022     Procedure: COLONOSCOPY;  Surgeon: Tena Hall MD;  Location: [facility] ENDO;  Service: Endoscopy;  Laterality: N/A;    DILATION AND CURETTAGE OF UTERUS             ESOPHAGOGASTRODUODENOSCOPY N/A 2023     Procedure: EGD (ESOPHAGOGASTRODUODENOSCOPY);  Surgeon: Govind James MD;  Location: [facility] ENDOSCOPY;  Service: Endoscopy;  Laterality: N/A;   instruc sent via email  [facility]   Pre call attempted. Left VM    right tubal         SALPINGOOPHORECTOMY        left    TUBAL LIGATION   2008         Social History    Social History            Tobacco Use    Smoking status: Never    Smokeless tobacco: Never   Substance Use Topics    Alcohol use: Yes       Alcohol/week: 1.0 standard drink of alcohol       Types: 1 Glasses of wine per week       Comment: Social drink, glass of wine if eating out    Drug use: Never                   Family History   Problem Relation Name Age of Onset    Hypertension Mother Latha Garrison      Osteoporosis Mother Latha Garrison      Cancer Father Grant Reyesr      Leukemia Father Grant Reyesr      Hypertension Sister David Garrison      Colon cancer Maternal Grandmother        Arthritis Paternal Grandmother Jeanette Hankins      Breast cancer Neg Hx        Ovarian cancer Neg Hx                MEDS:  Medications Ordered Prior to Encounter          Current Outpatient Medications on File Prior to Visit   Medication Sig Dispense Refill    azelastine (ASTELIN) 137 mcg (0.1 %) nasal spray 1 spray (137 mcg total) by Nasal route 2 (two) times daily. 30 mL 0    fluticasone propionate (FLONASE) 50 mcg/actuation nasal spray 1 spray (50 mcg total) by Each Nostril route once daily. 16 g 0    olmesartan-hydrochlorothiazide (BENICAR HCT) 40-25 mg per tablet Take 1 tablet by mouth once daily. 90 tablet 2    [DISCONTINUED] olmesartan (BENICAR) 40 MG tablet Take 1 tablet by mouth once daily 30 tablet 0      No current facility-administered medications on file prior to visit.            ALLERGIES:        Review of patient's allergies indicates:   Allergen Reactions    Morphine Anaphylaxis, Hives, Itching and Shortness Of Breath    Opioids - morphine analogues Hives and Swelling       Throat swelling    Compazine [prochlorperazine]      Compazine [prochlorperazine] Nausea And Vomiting            VITALS:  There were no vitals filed for this visit.        PHYSICAL EXAM:  Physical Exam  Vitals reviewed.   Constitutional:       General: She is not in acute distress.     Appearance: Normal  "appearance. She is well-developed.   HENT:      Head: Normocephalic and atraumatic.      Nose: Nose normal.   Eyes:      General: No scleral icterus.     Conjunctiva/sclera: Conjunctivae normal.   Neck:      Trachea: No tracheal tenderness or tracheal deviation.   Cardiovascular:      Rate and Rhythm: Normal rate and regular rhythm.      Pulses: Normal pulses.   Pulmonary:      Effort: Pulmonary effort is normal. No accessory muscle usage or respiratory distress.      Breath sounds: Normal breath sounds.   Abdominal:      General: There is no distension.      Palpations: Abdomen is soft.      Tenderness: There is no abdominal tenderness.   Musculoskeletal:         General: No swelling or tenderness. Normal range of motion.      Cervical back: Normal range of motion and neck supple. No rigidity.   Skin:     General: Skin is warm and dry.      Coloration: Skin is not jaundiced.      Findings: No erythema.   Neurological:      General: No focal deficit present.      Mental Status: She is alert and oriented to person, place, and time.      Motor: No weakness or abnormal muscle tone.   Psychiatric:         Mood and Affect: Mood normal.         Behavior: Behavior normal.         Thought Content: Thought content normal.         Judgment: Judgment normal.               LABS:        Lab Results   Component Value Date     WBC 5.25 02/19/2024     RBC 5.42 (H) 02/19/2024     HGB 13.5 02/19/2024     HCT 44.5 02/19/2024      02/19/2024            Lab Results   Component Value Date     GLU 97 02/24/2024      02/24/2024     K 4.1 02/24/2024      02/24/2024     CO2 27 02/24/2024     BUN 15 02/24/2024     CREATININE 1.0 02/24/2024     CALCIUM 9.6 02/24/2024            Lab Results   Component Value Date     ALT 89 (H) 02/24/2024     AST 19 02/24/2024      (H) 02/24/2024     ALKPHOS 134 02/24/2024     BILITOT 0.4 02/24/2024      No results found for: "MG", "PHOS"     STUDIES:  Images and reports were " personally reviewed.  Abdominal ultrasound 02/24/2024  FINDINGS:  Pancreas: The visualized portions of pancreas appear normal.     Aorta: No aneurysm.     Liver: 15.0 cm, normal in size. Homogeneous parenchymal echotexture. Echogenic, consistent with fatty infiltration.  No focal abnormalities are present.     Gallbladder: The gallbladder is filled with gallstones.  The posterior wall is obscured.  The anterior wall is not thickened.  There is no pericholecystic fluid.  No sonographic Aceves's sign was elicited.     Biliary system: 5 mm common bile duct.  No intrahepatic ductal dilatation.     Inferior vena cava: Normal in appearance.     Right kidney: 11.1 normal cm. No hydronephrosis.     Left kidney: 12.6 cm. No hydronephrosis.     Spleen: 12.4 cm.  Normal in size with homogeneous echotexture.     Miscellaneous: No ascites.     Impression:     Fatty infiltration of the liver.  Cholelithiasis without ultrasound evidence of acute cholecystitis..        ASSESSMENT & PLAN:  58 y.o. female with gallstones and upper abdominal discomfort  -signs and symptoms consistent with symptomatic cholelithiasis  -expect patient would have improvement of symptoms with cholecystectomy  - risks and benefits of cholecystectomy were reviewed.  Specifically we discussed the risk of pain, bleeding, scarring, infection, bile leak, injury to common bile duct, injury to surrounding organs, retained stone, pancreatitis, bile leak, failure to relieve symptoms, etc.  we also discussed the need for potential conversion to open procedure or need to perform intraoperative cholangiogram.  The patient expressed understanding of these risks and agreed proceed with surgical intervention.  -patient was interested in laparoscopic cholecystectomy will discuss with work and contact the office to schedule in the future  -will need updated labs, EKGs recent from 3/4/24

## 2024-10-08 NOTE — PLAN OF CARE
Dr. Fuentes notified that patient did well with 1 mg Dilaudid IV. Stated he will send a prescription for po dilaudid to pharmacy shortly.

## 2024-10-08 NOTE — PLAN OF CARE
AAO x3. VSS. NAD. Resp E/U. Calm and cooperative. Speech appropriate. Tolerating clear liquids. Denies nausea. Pain controlled. IV patent and infusing. Incision x4 to abdomen CDI. Due to void. Family notified of patient status. All safety measures taken. Bed in low position. Bedrails up x2. Bed locked. Cleared by Anesthesia.

## 2024-10-08 NOTE — OP NOTE
DATE OF PROCEDURE: 10/08/2024    PREOPERATIVE DIAGNOSIS: Symptomatic cholelithiasis    POSTOPERATIVE DIAGNOSIS: Same    PROCEDURE:  Robotic assisted cholecystectomy    SURGEON: Joon Fuentes M.D    ASSISTANT: Donna Matias MD PGY III    ANESTHESIA: General endotracheal    ESTIMATED BLOOD LOSS: Minimal    SPECIMEN: Gallbladder    CONDITION: Stable    COMPLICATIONS: None    FINDINGS:   Gallbladder grossly normal other than stones  Critical view of safety achieved  No spillage of stones or bile occurred    INDICATIONS: The patient is a 58 y.o. female who presented to clinic with a  history of right upper quadrant epigastric pain suggestive of biliary etiology. The patient was counseled on their surgical options and desired surgical intervention. The risks of the procedure were described to the patient including pain, infection, bleeding, scarring, wound complications, injury to local structures such as bile duct, liver or intestine, warranting more extensive surgery, retained common bile duct stone or need for further intervention. The patient demonstrated understanding of these risks and a consent form was obtained.    PROCEDURE IN DETAIL: Patient was identified in the preoperative unit and taken back to the operating room and laid in the supine position on the operating table.  IV antibiotics were administered and general anesthesia was induced without complications.  The right arm was tucked and appropriately padded.  The patient was then prepped and draped in typical sterile fashion. A time-out was performed in accordance with hospital protocol. At butler's point a Veress needle was inserted into the abdominal cavity and attached insufflation.  Wehad appropriate initial pressures and pneumoperitoneum was achieved.  An 8 mm Optiview trocar was introduced in the right mid lateral under direct visualization. Our port insertion site and Veress needle insertion site were examined for injury and  none was identified. Under direct visualization we placed additional 8 mm trocars in the right periumbilical, left periumbilical and left upper lateral abdomen.  The robot was then docked. The gallbladder was identified and found to be grossly normal other than gallstones. The gallbladder fundus was grasped and retracted into the right upper quadrant and the infundibulum retracted laterally. The peritoneum over the infundibulum and cystic structures was then gently stripped until the cystic duct and artery were identified and the critical view of safety was achieved.The cystic duct and artery were doubly clipped proximally and once distally and then divided. The gallbladder was then dissected off the gallbladder fossa using electrocautery. Once this was completed, it was placed into an EndoCatch bag.  The dissection field was inspected for hemostasis, bile leak and to confirm clips were in place. The robot was then undocked and the specimen was removed through the left luis manuel umbilical port site incision which had to be extended.  The extended port site was then closed with a 0 Vicryl fascial stitch using a Mark-Matti device.  The ports were removed and the abdomen desulfated.  Additional local anesthetic was injected and all the skin incisions were closed using 4-0 Vicryl subcuticular stitch. Dermabond was then applied. The patient was awakened from general anesthesia without complication and returned to the Postoperative Recovery Unit in stable condition. At the end of the case, sponge, instrument and needle counts were correct on 2 occasions. I was present and scrubbed throughout the entirety of the case.

## 2024-10-08 NOTE — DISCHARGE SUMMARY
[facility]  Discharge Note  Short Stay    Procedure(s) (LRB):  ROBOTIC CHOLECYSTECTOMY (N/A)      OUTCOME: Patient tolerated treatment/procedure well without complication and is now ready for discharge.    DISPOSITION: Home or Self Care    FINAL DIAGNOSIS:  <principal problem not specified>    FOLLOWUP: In clinic    DISCHARGE INSTRUCTIONS:    Discharge Procedure Orders   Diet Adult Regular     Ice to affected area     Lifting restrictions   Order Comments: Please avoid lifting greater than 40 lb, straining, strenuous activity for four weeks.     Change dressing (specify)   Order Comments: Post-Operative Wound Care    A surgical glue has been placed over your incisions, please leave the glue in place and do not attempt to remove it.  It is ok to shower using mild soap and water over the incisions the day after your procedure. Pat dry your incisions. Do not soak in a bathtub or other body of water for 2 weeks or until cleared by your surgeon.     If you noticed redness, swelling, fever, increasing pain or significant drainage from your wound please call/message the office or the 24 hr nurse hotline after hours.     Notify your health care provider if you experience any of the following:  temperature >100.4     Notify your health care provider if you experience any of the following:  persistent nausea and vomiting or diarrhea     Notify your health care provider if you experience any of the following:  severe uncontrolled pain     Notify your health care provider if you experience any of the following:  redness, tenderness, or signs of infection (pain, swelling, redness, odor or green/yellow discharge around incision site)     Notify your health care provider if you experience any of the following:  worsening rash     Notify your health care provider if you experience any of the following:  increased confusion or weakness     Shower on day dressing removed (No bath)        TIME SPENT ON  DISCHARGE: 10 minutes

## 2024-10-08 NOTE — PLAN OF CARE
Pt prepped for surgery. Consents at bedside. Incentive spirometry taught by respiratory. Pt belongings placed in postop cabinet. Ross set up with text alerts.

## 2024-10-08 NOTE — ANESTHESIA PROCEDURE NOTES
Intubation    Date/Time: 10/8/2024 7:43 AM    Performed by: Rory Smiley CRNA  Authorized by: Adriana Watkins MD    Intubation:     Induction:  Intravenous    Intubated:  Postinduction    Mask Ventilation:  Easy mask    Attempts:  1    Attempted By:  CRNA    Method of Intubation:  Video laryngoscopy    Blade:  Bravo 3    Laryngeal View Grade: Grade I - full view of cords      Difficult Airway Encountered?: No      Complications:  None    Airway Device:  Oral endotracheal tube    Airway Device Size:  7.0    Style/Cuff Inflation:  Cuffed (inflated to minimal occlusive pressure)    Tube secured:  21    Secured at:  The lips    Placement Verified By:  Capnometry    Complicating Factors:  None    Findings Post-Intubation:  BS equal bilateral and atraumatic/condition of teeth unchanged

## 2024-10-08 NOTE — PLAN OF CARE
Patient able to void with no problems noted.  Discharge instructions given to pt and family/friend, verbalized understanding and questions answered. Handouts provided. Belongings given back to pt. IV removed- catheter intact. Discharge via wheelchair.

## 2024-10-08 NOTE — DISCHARGE INSTRUCTIONS
Post op instructions for prevention of DVT  What is deep vein thrombosis?  Deep vein thrombosis (DVT) is the medical term for blood clots in the deep veins of the leg.  These blood clots can be dangerous.  A DVT can block a blood vessel and keep blood from getting where it needs to go.  Another problem is that the clot can travel to other parts of the body such as the lungs.  A clot that travels to the lungs is called a pulmonary embolus (PE) and can cause serious problems with breathing which can lead to death.  Am I at risk for DVT/PE?  If you are not very active, you are at risk of DVT.  Anyone confined to bed, sitting for long periods of time, recovering from surgery, etc. increases the risk of DVT.  Other risk factors are cancer diagnosis, certain medications, estrogen replacement in any form,older age, obesity, pregnancy, smoking, history of clotting disorders, and dehydration.  How will I know if I have a DVT?  Swelling in the lower leg  Pain  Warmth, redness, hardness or bulging of the vein  If you have any of these symptoms, call your doctors office right away.  Some people will not have any symptoms until the clot moves to the lungs.  What are the symptoms of a PE?  Panting, shortness of breath, or trouble breathing  Sharp, knife-like chest pain when you breathe  Coughing or coughing up blood  Rapid heartbeat  If you have any of these symptoms or get worse quickly, call 911 for emergency treatment.  How can I prevent a DVT?  Avoid long periods of inactivity and dont cross your legs--get up and walk around every hour or so.  Stay active--walking after surgery is highly encouraged.  This means you should get out of the house and walk in the neighborhood.  Going up and down stairs will not impair healing (unless advised against such activity by your doctor).    Drink plenty of noncaffeinated, nonalcoholic fluids each day to prevent dehydration.  Wear special support stockings, if they have been advised by  "your doctor.  If you travel, stop at least once an hour and walk around.  Avoid smoking (assistance with stopping is available through your healthcare provider)  Always notify your doctor if you are not able to follow the post operative instructions that are given to you at the time of discharge.  It may be necessary to prescribe one of the medications available to prevent DVT.Discharge Instructions: After Your Surgery/Procedure  Youve just had surgery. During surgery you were given medicine called anesthesia to keep you relaxed and free of pain. After surgery you may have some pain or nausea. This is common. Here are some tips for feeling better and getting well after surgery.     Stay on schedule with your medication.   Going home  Your doctor or nurse will show you how to take care of yourself when you go home. He or she will also answer your questions. Have an adult family member or friend drive you home.      For your safety we recommend these precaution for the first 24 hours after your procedure:  Do not drive or use heavy equipment.  Do not make important decisions or sign legal papers.  Do not drink alcohol.  Have someone stay with you, if needed. He or she can watch for problems and help keep you safe.  Your concentration, balance, coordination, and judgement may be impaired for many hours after anesthesia.  Use caution when ambulating or standing up.     You may feel weak and "washed out" after anesthesia and surgery.      Subtle residual effects of general anesthesia or sedation with regional / local anesthesia can last more than 24 hours.  Rest for the remainder of the day or longer if your Doctor/Surgeon has advised you to do so.  Although you may feel normal within the first 24 hours, your reflexes and mental ability may be impaired without you realizing it.  You may feel dizzy, lightheaded or sleepy for 24 hours or longer.      Be sure to go to all follow-up visits with your doctor. And rest after " your surgery for as long as your doctor tells you to.  Coping with pain  If you have pain after surgery, pain medicine will help you feel better. Take it as told, before pain becomes severe. Also, ask your doctor or pharmacist about other ways to control pain. This might be with heat, ice, or relaxation. And follow any other instructions your surgeon or nurse gives you.  Tips for taking pain medicine  To get the best relief possible, remember these points:  Pain medicines can upset your stomach. Taking them with a little food may help.  Most pain relievers taken by mouth need at least 20 to 30 minutes to start to work.  Taking medicine on a schedule can help you remember to take it. Try to time your medicine so that you can take it before starting an activity. This might be before you get dressed, go for a walk, or sit down for dinner.  Constipation is a common side effect of pain medicines. Call your doctor before taking any medicines such as laxatives or stool softeners to help ease constipation. Also ask if you should skip any foods. Drinking lots of fluids and eating foods such as fruits and vegetables that are high in fiber can also help. Remember, do not take laxatives unless your surgeon has prescribed them.  Drinking alcohol and taking pain medicine can cause dizziness and slow your breathing. It can even be deadly. Do not drink alcohol while taking pain medicine.  Pain medicine can make you react more slowly to things. Do not drive or run machinery while taking pain medicine.  Your health care provider may tell you to take acetaminophen to help ease your pain. Ask him or her how much you are supposed to take each day. Acetaminophen or other pain relievers may interact with your prescription medicines or other over-the-counter (OTC) drugs. Some prescription medicines have acetaminophen and other ingredients. Using both prescription and OTC acetaminophen for pain can cause you to overdose. Read the labels on  your OTC medicines with care. This will help you to clearly know the list of ingredients, how much to take, and any warnings. It may also help you not take too much acetaminophen. If you have questions or do not understand the information, ask your pharmacist or health care provider to explain it to you before you take the OTC medicine.  Managing nausea  Some people have an upset stomach after surgery. This is often because of anesthesia, pain, or pain medicine, or the stress of surgery. These tips will help you handle nausea and eat healthy foods as you get better. If you were on a special food plan before surgery, ask your doctor if you should follow it while you get better. These tips may help:  Do not push yourself to eat. Your body will tell you when to eat and how much.  Start off with clear liquids and soup. They are easier to digest.  Next try semi-solid foods, such as mashed potatoes, applesauce, and gelatin, as you feel ready.  Slowly move to solid foods. Dont eat fatty, rich, or spicy foods at first.  Do not force yourself to have 3 large meals a day. Instead eat smaller amounts more often.  Take pain medicines with a small amount of solid food, such as crackers or toast, to avoid nausea.     Call your surgeon if  You still have pain an hour after taking medicine. The medicine may not be strong enough.  You feel too sleepy, dizzy, or groggy. The medicine may be too strong.  You have side effects like nausea, vomiting, or skin changes, such as rash, itching, or hives.       If you have obstructive sleep apnea  You were given anesthesia medicine during surgery to keep you comfortable and free of pain. After surgery, you may have more apnea spells because of this medicine and other medicines you were given. The spells may last longer than usual.   At home:  Keep using the continuous positive airway pressure (CPAP) device when you sleep. Unless your health care provider tells you not to, use it when you  sleep, day or night. CPAP is a common device used to treat obstructive sleep apnea.  Talk with your provider before taking any pain medicine, muscle relaxants, or sedatives. Your provider will tell you about the possible dangers of taking these medicines.  © 5574-3348 [employer]. [address] [place] [address]. All rights reserved. This information is not intended as a substitute for professional medical care. Always follow your healthcare professional's instructions.  General Information:    1.  Do not drink alcoholic beverages including beer for 24 hours or as long as you are on pain medication..  2.  Do not drive a motor vehicle, operate machinery or power tools, or signs legal papers for 24 hours or as long as you are on pain medication.   3.  You may experience light-headedness, dizziness, and sleepiness following surgery. Please do not stay alone. A responsible adult should be with you for this 24 hour period.  4.  Go home and rest.    5. Progress slowly to a normal diet unless instructed.  Otherwise, begin with liquids such as soft drinks, then soup and crackers working up to solid foods. Drink plenty of nonalcoholic fluids.  6.  Certain anesthetics and pain medications produce nausea and vomiting in certain       individuals. If nausea becomes a problem at home, call you doctor.    7. A nurse will be calling you sometime after surgery. Do not be alarmed. This is our way of finding out how you are doing.    8. Several times every hour while you are awake, take 2-3 deep breaths and cough. If you had stomach surgery hold a pillow or rolled towel firmly against your stomach before you cough. This will help with any pain the cough might cause.  9. Several times every hour while you are awake, pump and flex your feet 5-6 times and do foot circles. This will help prevent blood clots.    10.Call your doctor for severe pain, bleeding, fever, or signs or symptoms of infection (pain, swelling,  redness, foul odor, drainage).  Using Opioids for Pain Management     Your doctor has given instructions for you to take an opioid.  This is a drug for bad pain.  It helps control pain without causing bleeding and kidney problems.  Common opioid names are morphine, hydromorphone, oxycodone, and methadone. These drugs are called narcotics.    There are several safety concerns you need to know.     It is against the law to give or sell this drug to another person.  You must keep this medicine safely locked.    You may have side effects from taking this medication.  These include nausea, itching, sweating, sleepiness, a change in your ability to breathe, and depression.  Do not take alcohol or sleeping pills opioids.    Long-term opoid use may no longer giver you relief from pain.  It can cause you stomach pain, mental anxiety, and headaches.  Long-term opoid use can potentially lead to unlawful street drug abuse and reduce your ability to stay employed.    Your body may become opioid tolerant if you need to take more to get relief.    You must stop taking opioids if you begin having more pain as a result of the medicine.    Opioid withdrawal occurs when you have to stop taking the drug.  It can cause you to have nausea, vomiting, diarrhea, stomach pain, anxiety, and dilated pupils in your eyes. This condition means you are opioid dependent.    Addiction is a drug induced brain disease. It means there are changes in how your brain is working.  Children, teens, and young adults under 25 years old are more likely to get addicted to opioids.      Addiction can happen with repeated opioid use.  It does not happen with short-term use of two weeks or less.       For more information, please speak with your doctor or pharmacist.    Using an Incentive Spirometer    An incentive spirometer is a device that helps you do deep breathing exercises. These exercises expand your lungs, aid in circulation, and help prevent pneumonia.  Deep breathing exercises also help you breathe better and improve the function of your lungs by:  Keeping your lungs clear  Strengthening your breathing muscles  Helping prevent respiratory complications or problems  The incentive spirometer gives you a way to take an active part in recover. A nurse or therapist will teach you breathing exercises. To do these exercises, you will breathe in through your mouth and not your nose. The incentive spirometer only works correctly if you breathe in through your mouth.  Steps to clear lungs  Step 1. Exhale normally. Then, inhale normally.  Relax and breathe out.  Step 2. Place your lips tightly around the mouthpiece.  Make sure the device is upright and not tilted.  Step 3. Inhale as much air as you can through the mouthpiece (don't breath through your nose).  Inhale slowly and deeply.  Hold your breath long enough to keep the balls or disk raised for at least 3 to 5 seconds, or as instructed by your healthcare provider.  Some spirometers have an indicator to let you know that you are breathing in too fast. If the indicator goes off, breathe in more slowly.  Step 4. Repeat the exercise regularly.  Do this exercise every hour while you're awake, or as instructed by your healthcare provider.  If you were taught deep breathing and coughing exercises, do them regularly as instructed by your healthcare provider.       We hope your stay was comfortable as you heal now, mend and rest.    For we have enjoyed taking care of you by giving your our best.    And as you get better, by regaining your health and strength;   We count it as a privilege to have served you and hope your time at [facility] was well spent.      Thank  You!!!

## 2024-10-08 NOTE — ANESTHESIA POSTPROCEDURE EVALUATION
Anesthesia Post Evaluation    Patient: Alejandrina Garrison    Procedure(s) Performed: Procedure(s) (LRB):  ROBOTIC CHOLECYSTECTOMY (N/A)    Final Anesthesia Type: general      Patient location during evaluation: PACU  Patient participation: Yes- Able to Participate  Level of consciousness: awake and alert  Post-procedure vital signs: reviewed and stable  Pain management: adequate  Airway patency: patent    PONV status at discharge: No PONV  Anesthetic complications: no      Cardiovascular status: blood pressure returned to baseline  Respiratory status: unassisted and room air  Hydration status: euvolemic  Follow-up not needed.              Vitals Value Taken Time   /56 10/08/24 0955   Temp 36.4 °C (97.5 °F) 10/08/24 0915   Pulse 70 10/08/24 0957   Resp 12 10/08/24 0957   SpO2 96 % 10/08/24 0957   Vitals shown include unfiled device data.      No case tracking events are documented in the log.      Pain/Huseyin Score: Pain Rating Prior to Med Admin: 8 (10/8/2024  9:45 AM)  Huseyin Score: 9 (10/8/2024  9:45 AM)

## 2024-10-09 VITALS
OXYGEN SATURATION: 99 % | HEIGHT: 67 IN | RESPIRATION RATE: 16 BRPM | BODY MASS INDEX: 36.41 KG/M2 | TEMPERATURE: 98 F | SYSTOLIC BLOOD PRESSURE: 115 MMHG | DIASTOLIC BLOOD PRESSURE: 57 MMHG | WEIGHT: 232 LBS | HEART RATE: 69 BPM

## 2024-10-23 ENCOUNTER — OFFICE VISIT (OUTPATIENT)
Dept: SURGERY | Facility: CLINIC | Age: 59
End: 2024-10-23
Payer: COMMERCIAL

## 2024-10-23 VITALS — HEART RATE: 83 BPM | DIASTOLIC BLOOD PRESSURE: 78 MMHG | SYSTOLIC BLOOD PRESSURE: 125 MMHG | TEMPERATURE: 98 F

## 2024-10-23 DIAGNOSIS — Z90.49 S/P LAPAROSCOPIC CHOLECYSTECTOMY: Primary | ICD-10-CM

## 2024-10-23 PROCEDURE — 99024 POSTOP FOLLOW-UP VISIT: CPT | Mod: S$GLB,,, | Performed by: SURGERY

## 2024-10-23 PROCEDURE — 1159F MED LIST DOCD IN RCRD: CPT | Mod: CPTII,S$GLB,, | Performed by: SURGERY

## 2024-10-23 PROCEDURE — 99999 PR PBB SHADOW E&M-EST. PATIENT-LVL II: CPT | Mod: PBBFAC,,, | Performed by: SURGERY

## 2024-10-23 PROCEDURE — 3078F DIAST BP <80 MM HG: CPT | Mod: CPTII,S$GLB,, | Performed by: SURGERY

## 2024-10-23 PROCEDURE — 3074F SYST BP LT 130 MM HG: CPT | Mod: CPTII,S$GLB,, | Performed by: SURGERY

## 2024-10-23 PROCEDURE — 3044F HG A1C LEVEL LT 7.0%: CPT | Mod: CPTII,S$GLB,, | Performed by: SURGERY

## 2024-10-23 NOTE — PROGRESS NOTES
GENERAL SURGERY  POST-OP PROGRESS NOTE    HPI: Alejandrina Garrison is a 58 y.o. female status post cholecystectomy for symptomatic cholelithiasis.  Feeling much better.  No significant nausea or vomiting.  Mild short episodes of abdominal discomfort at incision sites but no severe pain. Some constipation present.    VITALS:  Vitals:    10/23/24 0901   BP: 125/78   Pulse: 83   Temp: 97.6 °F (36.4 °C)       PHYSICAL EXAM:  All abdominal port sites well healed without signs of infection or breakdown    PATHOLOGY:  GALLBLADDER, CHOLECYSTECTOMY     - CHRONIC CHOLECYSTITIS     - CHOLELITHIASIS     - ONE BENIGN LYMPH NOD     ASSESSMENT & PLAN:  58 y.o. female s/p robotic cholecystectomy  -symptomatically improved  -diet as tolerated  -stool softener recommended  -return to clinic p.r.n.

## 2024-11-20 ENCOUNTER — PATIENT MESSAGE (OUTPATIENT)
Dept: PRIMARY CARE CLINIC | Facility: CLINIC | Age: 59
End: 2024-11-20
Payer: COMMERCIAL

## 2024-11-22 ENCOUNTER — OFFICE VISIT (OUTPATIENT)
Dept: PRIMARY CARE CLINIC | Facility: CLINIC | Age: 59
End: 2024-11-22
Payer: COMMERCIAL

## 2024-11-22 ENCOUNTER — PATIENT MESSAGE (OUTPATIENT)
Dept: PRIMARY CARE CLINIC | Facility: CLINIC | Age: 59
End: 2024-11-22

## 2024-11-22 VITALS
SYSTOLIC BLOOD PRESSURE: 120 MMHG | HEIGHT: 67 IN | BODY MASS INDEX: 36.72 KG/M2 | WEIGHT: 233.94 LBS | OXYGEN SATURATION: 97 % | HEART RATE: 84 BPM | DIASTOLIC BLOOD PRESSURE: 74 MMHG

## 2024-11-22 DIAGNOSIS — E66.09 CLASS 2 OBESITY DUE TO EXCESS CALORIES WITHOUT SERIOUS COMORBIDITY WITH BODY MASS INDEX (BMI) OF 37.0 TO 37.9 IN ADULT: ICD-10-CM

## 2024-11-22 DIAGNOSIS — K21.9 GASTROESOPHAGEAL REFLUX DISEASE WITHOUT ESOPHAGITIS: ICD-10-CM

## 2024-11-22 DIAGNOSIS — Z00.00 ANNUAL PHYSICAL EXAM: ICD-10-CM

## 2024-11-22 DIAGNOSIS — E66.812 CLASS 2 OBESITY DUE TO EXCESS CALORIES WITHOUT SERIOUS COMORBIDITY WITH BODY MASS INDEX (BMI) OF 37.0 TO 37.9 IN ADULT: ICD-10-CM

## 2024-11-22 DIAGNOSIS — I10 ESSENTIAL HYPERTENSION: Primary | ICD-10-CM

## 2024-11-22 DIAGNOSIS — R07.89 OTHER CHEST PAIN: ICD-10-CM

## 2024-11-22 PROCEDURE — 99999 PR PBB SHADOW E&M-EST. PATIENT-LVL III: CPT | Mod: PBBFAC,,, | Performed by: INTERNAL MEDICINE

## 2024-11-22 RX ORDER — PANTOPRAZOLE SODIUM 40 MG/1
40 TABLET, DELAYED RELEASE ORAL DAILY
Qty: 30 TABLET | Refills: 0 | Status: SHIPPED | OUTPATIENT
Start: 2024-11-22 | End: 2025-11-22

## 2024-11-22 NOTE — PROGRESS NOTES
"[facility] Note    Chief Complaint      Chief Complaint   Patient presents with    Dizziness     C/o being light headed, BP was 103/63 today     Numbness     C/o "weird feeling" in both hands and feet      History of Present Illness      Alejandrina Garrison is a 58 y.o. female who presents today for dizziness, arm pain.   Patient comes to appointment alone.    S/p cholecystectomy on 10/8/2024.  C/o LUE numbness and indigestion for last 2 weeks.  Gets tingling/pressure sensation in left upper chest and right hand.  Some SOB even at rest.  Feels lightheaded like room is spinning around her.  Blood pressure was low this AM but came back up.  Arm pain woke her up out of sleep. Indigestion goes away with OTC PPI and peppermint oil.     2022: Holter, EKG stress and TTE all unremarkable.  Problem List Items Addressed This Visit       Class 2 obesity due to excess calories without serious comorbidity with body mass index (BMI) of 37.0 to 37.9 in adult    Relevant Orders    Hemoglobin A1C    Essential hypertension - Primary    Current Assessment & Plan     Stable on Olmesartan/HCTZ 40/25 mg daily. No CP/SOB.          Gastroesophageal reflux disease without esophagitis    Relevant Medications    pantoprazole (PROTONIX) 40 MG tablet     Other Visit Diagnoses       Annual physical exam        Relevant Orders    CBC Auto Differential    Lipid Panel    Comprehensive Metabolic Panel    Other chest pain        Relevant Orders    Exercise Stress - EKG              Health Maintenance   Topic Date Due    TETANUS VACCINE  Never done    Shingles Vaccine (1 of 2) Never done    Mammogram  10/01/2025    Lipid Panel  2029    Colorectal Cancer Screening  2032    Hepatitis C Screening  Completed       Past Medical History:   Diagnosis Date    Essential (primary) hypertension     Hypertension        Past Surgical History:   Procedure Laterality Date     SECTION  1988    x3, 1998, ,     " COLONOSCOPY N/A 9/30/2022    Procedure: COLONOSCOPY;  Surgeon: Tena Hall MD;  Location: [facility] ENDO;  Service: Endoscopy;  Laterality: N/A;    DILATION AND CURETTAGE OF UTERUS      2018    ESOPHAGOGASTRODUODENOSCOPY N/A 9/14/2023    Procedure: EGD (ESOPHAGOGASTRODUODENOSCOPY);  Surgeon: Govind James MD;  Location: [facility] ENDOSCOPY;  Service: Endoscopy;  Laterality: N/A;  7/14 instruc sent via email  [facility]  9/8 Pre call attempted. Left VM    right tubal       ROBOT-ASSISTED CHOLECYSTECTOMY N/A 10/8/2024    Procedure: ROBOTIC CHOLECYSTECTOMY;  Surgeon: Joon Fuentes Jr., MD;  Location: [facility] OR;  Service: General;  Laterality: N/A;    SALPINGOOPHORECTOMY  2008    left    TUBAL LIGATION  04/01/2008       family history includes Arthritis in her paternal grandmother; Cancer in her father; Colon cancer in her maternal grandmother; Hypertension in her mother and sister; Leukemia in her father; Osteoporosis in her mother.    Social History     Tobacco Use    Smoking status: Never    Smokeless tobacco: Never   Substance Use Topics    Alcohol use: Not Currently     Alcohol/week: 1.0 standard drink of alcohol     Comment: Social drink, glass of wine if eating out    Drug use: Never       Review of Systems   Constitutional:  Negative for chills and fever.   HENT:  Negative for sore throat.    Respiratory:  Negative for cough and shortness of breath.    Cardiovascular:  Negative for chest pain and palpitations.   Gastrointestinal:  Negative for constipation, diarrhea, nausea and vomiting.   Genitourinary:  Negative for dysuria and hematuria.   Musculoskeletal:  Negative for falls.   Neurological:  Negative for headaches.        Outpatient Encounter Medications as of 11/22/2024   Medication Sig Dispense Refill    azelastine (ASTELIN) 137 mcg (0.1 %) nasal spray 1 spray (137 mcg total) by Nasal route 2 (two) times daily. 30 mL 0    fluticasone propionate (FLONASE) 50 mcg/actuation nasal spray 1 spray (50 mcg  "total) by Each Nostril route once daily. 16 g 0    olmesartan-hydrochlorothiazide (BENICAR HCT) 40-25 mg per tablet Take 1 tablet by mouth once daily. 90 tablet 2    pantoprazole (PROTONIX) 40 MG tablet Take 1 tablet (40 mg total) by mouth once daily. 30 tablet 0    [DISCONTINUED] HYDROmorphone (DILAUDID) 4 MG tablet Take 0.5 tablets (2 mg total) by mouth every 6 (six) hours as needed for Pain. (Patient not taking: Reported on 10/23/2024) 20 tablet 0    [DISCONTINUED] olmesartan (BENICAR) 40 MG tablet Take 1 tablet by mouth once daily 30 tablet 0     No facility-administered encounter medications on file as of 11/22/2024.        Review of patient's allergies indicates:   Allergen Reactions    Morphine Anaphylaxis, Hives, Itching and Shortness Of Breath    Opioids - morphine analogues Hives and Swelling     Throat swelling    Compazine [prochlorperazine]     Compazine [prochlorperazine] Nausea And Vomiting       Physical Exam      Vital Signs  Pulse: 84  SpO2: 97 %  BP: 120/74  Pain Score: 0-No pain  Height and Weight  Height: 5' 7" (170.2 cm)  Weight: 106.1 kg (233 lb 14.5 oz)  BSA (Calculated - sq m): 2.24 sq meters  BMI (Calculated): 36.6  Weight in (lb) to have BMI = 25: 159.3]    Physical Exam  Constitutional:       Appearance: She is well-developed.   HENT:      Head: Normocephalic and atraumatic.      Right Ear: External ear normal.      Left Ear: External ear normal.   Eyes:      General:         Right eye: No discharge.         Left eye: No discharge.   Cardiovascular:      Rate and Rhythm: Normal rate and regular rhythm.      Heart sounds: Normal heart sounds. No murmur heard.  Pulmonary:      Effort: Pulmonary effort is normal. No respiratory distress.      Breath sounds: Normal breath sounds.   Abdominal:      General: There is no distension.      Palpations: Abdomen is soft.      Tenderness: There is no abdominal tenderness. There is no guarding.   Musculoskeletal:         General: Normal range of " "motion.      Cervical back: Normal range of motion.   Skin:     General: Skin is warm and dry.   Neurological:      Mental Status: She is alert and oriented to person, place, and time.   Psychiatric:         Behavior: Behavior normal.          Laboratory:  CBC:  Recent Labs   Lab Result Units 10/02/24  0846   WBC K/uL 6.96   RBC M/uL 5.15   Hemoglobin g/dL 13.3   Hematocrit % 43.1   Platelets K/uL 327   MCV fL 84   MCH pg 25.8*   MCHC g/dL 30.9*       CMP:  Recent Labs   Lab Result Units 10/02/24  0846   Glucose mg/dL 92   Calcium mg/dL 9.5   Albumin g/dL 4.0   Total Protein g/dL 7.4   Sodium mmol/L 142   Potassium mmol/L 3.7   CO2 mmol/L 29   Chloride mmol/L 107   BUN mg/dL 16   Alkaline Phosphatase U/L 74   ALT U/L 22   AST U/L 13   Total Bilirubin mg/dL 0.3       URINALYSIS:  No results for input(s): "COLORU", "CLARITYU", "SPECGRAV", "PHUR", "PROTEINUA", "GLUCOSEU", "BILIRUBINCON", "BLOODU", "WBCU", "RBCU", "BACTERIA", "MUCUS", "NITRITE", "LEUKOCYTESUR", "UROBILINOGEN", "HYALINECASTS" in the last 2160 hours.   LIPIDS:  No results for input(s): "TSH", "HDL", "CHOL", "TRIG", "LDLCALC", "CHOLHDL", "NONHDLCHOL", "TOTALCHOLEST" in the last 2160 hours.    TSH:  No results for input(s): "TSH" in the last 2160 hours.    A1C:  No results for input(s): "HGBA1C" in the last 2160 hours.      Radiology:  No results found in the last 30 days.     Assessment/Plan     Alejandrina Garrison is a 58 y.o.female with:    1. Essential hypertension    2. Annual physical exam  - CBC Auto Differential; Future  - Lipid Panel; Future  - Comprehensive Metabolic Panel; Future    3. Class 2 obesity due to excess calories without serious comorbidity with body mass index (BMI) of 37.0 to 37.9 in adult  - Hemoglobin A1C; Future    4. Gastroesophageal reflux disease without esophagitis  - pantoprazole (PROTONIX) 40 MG tablet; Take 1 tablet (40 mg total) by mouth once daily.  Dispense: 30 tablet; Refill: 0    5. Other chest pain  - Exercise Stress " - EKG; Future      -Continue current medications and maintain follow up with specialists.    -Follow up in about 6 months (around 5/22/2025).       Chapis Martinez MD  [facility]

## 2024-11-25 ENCOUNTER — HOSPITAL ENCOUNTER (OUTPATIENT)
Dept: CARDIOLOGY | Facility: HOSPITAL | Age: 59
Discharge: HOME | End: 2024-11-25
Attending: INTERNAL MEDICINE
Payer: COMMERCIAL

## 2024-11-25 VITALS — HEIGHT: 67 IN | BODY MASS INDEX: 36.57 KG/M2 | WEIGHT: 233 LBS

## 2024-11-25 DIAGNOSIS — R07.89 OTHER CHEST PAIN: ICD-10-CM

## 2024-11-25 LAB
CV STRESS BASE HR: 80 BPM
DIASTOLIC BLOOD PRESSURE: 102 MMHG
OHS CV CPX 1 MINUTE RECOVERY HEART RATE: 114 BPM
OHS CV CPX 85 PERCENT MAX PREDICTED HEART RATE MALE: 138
OHS CV CPX ESTIMATED METS: 11
OHS CV CPX MAX PREDICTED HEART RATE: 162
OHS CV CPX PATIENT IS FEMALE: 1
OHS CV CPX PATIENT IS MALE: 0
OHS CV CPX PEAK DIASTOLIC BLOOD PRESSURE: 85 MMHG
OHS CV CPX PEAK HEAR RATE: 142 BPM
OHS CV CPX PEAK RATE PRESSURE PRODUCT: NORMAL
OHS CV CPX PEAK SYSTOLIC BLOOD PRESSURE: 162 MMHG
OHS CV CPX PERCENT MAX PREDICTED HEART RATE ACHIEVED: 92
OHS CV CPX RATE PRESSURE PRODUCT PRESENTING: NORMAL
STRESS ECHO POST EXERCISE DUR MIN: 6 MINUTES
STRESS ECHO POST EXERCISE DUR SEC: 4 SECONDS
STRESS ST DEPRESSION: 5 MM
SYSTOLIC BLOOD PRESSURE: 135 MMHG

## 2024-11-25 PROCEDURE — 93016 CV STRESS TEST SUPVJ ONLY: CPT | Mod: ,,, | Performed by: INTERNAL MEDICINE

## 2024-11-25 PROCEDURE — 93017 CV STRESS TEST TRACING ONLY: CPT | Mod: PO

## 2024-11-25 PROCEDURE — 93018 CV STRESS TEST I&R ONLY: CPT | Mod: ,,, | Performed by: INTERNAL MEDICINE

## 2024-12-14 DIAGNOSIS — K21.9 GASTROESOPHAGEAL REFLUX DISEASE WITHOUT ESOPHAGITIS: ICD-10-CM

## 2024-12-14 NOTE — TELEPHONE ENCOUNTER
No care due was identified.  [facility] Embedded Care Due Messages. Reference number: [id number].   12/14/2024 10:24:24 AM CST

## 2024-12-16 RX ORDER — PANTOPRAZOLE SODIUM 40 MG/1
40 TABLET, DELAYED RELEASE ORAL DAILY
Qty: 90 TABLET | Refills: 3 | Status: SHIPPED | OUTPATIENT
Start: 2024-12-16 | End: 2025-12-16

## 2024-12-16 NOTE — TELEPHONE ENCOUNTER
Refill Routing Note   Medication(s) are not appropriate for processing by [facility] for the following reason(s):        New or recently adjusted medication    [facility] action(s):  Defer               Appointments  past 12m or future 3m with PCP    Date Provider   Last Visit   11/22/2024 Chapis Martinez MD   Next Visit   5/26/2025 Chapis Martinez MD   ED visits in past 90 days: 0        Note composed:1:52 AM 12/16/2024

## 2024-12-21 ENCOUNTER — PATIENT MESSAGE (OUTPATIENT)
Dept: OPTOMETRY | Facility: CLINIC | Age: 59
End: 2024-12-21
Payer: COMMERCIAL

## 2025-01-06 NOTE — PROGRESS NOTES
Patient ID: Alejandrina Garrison is a 59 y.o. Black or  female    Subjective  Chief Complaint: patient presents for medical weight loss management.    Contraindications to GLP-1 receptor agonist therapy:   Denies personal or family history of MTC and personal history of MEN2     Co-morbidities: HTN    History of weight loss therapy:  Pt denies previous weight management medication use.     Weight loss history:  Starting weight: 230 lbs - pt reported   11/22/2024   Recent Readings    Weight (lbs) 230 lb    BMI 36.02 BMI      Objective  Lab Results   Component Value Date     10/02/2024     02/24/2024     02/19/2024     Lab Results   Component Value Date    K 3.7 10/02/2024    K 4.1 02/24/2024    K 4.6 02/19/2024     Lab Results   Component Value Date     10/02/2024     02/24/2024     02/19/2024     Lab Results   Component Value Date    CO2 29 10/02/2024    CO2 27 02/24/2024    CO2 27 02/19/2024     Lab Results   Component Value Date    BUN 16 10/02/2024    BUN 15 02/24/2024    BUN 14 02/19/2024     Lab Results   Component Value Date    GLU 92 10/02/2024    GLU 97 02/24/2024    GLU 93 02/19/2024     Lab Results   Component Value Date    CALCIUM 9.5 10/02/2024    CALCIUM 9.6 02/24/2024    CALCIUM 10.1 02/19/2024     Lab Results   Component Value Date    PROT 7.4 10/02/2024    PROT 7.6 02/24/2024    PROT 7.7 02/19/2024     Lab Results   Component Value Date    ALBUMIN 4.0 10/02/2024    ALBUMIN 4.1 02/24/2024    ALBUMIN 4.0 02/19/2024     Lab Results   Component Value Date    BILITOT 0.3 10/02/2024    BILITOT 0.4 02/24/2024    BILITOT 0.5 02/19/2024     Lab Results   Component Value Date    AST 13 10/02/2024    AST 19 02/24/2024     (H) 02/19/2024     Lab Results   Component Value Date    ALT 22 10/02/2024    ALT 89 (H) 02/24/2024     (H) 02/19/2024     Lab Results   Component Value Date    ANIONGAP 6 (L) 10/02/2024    ANIONGAP 12 02/24/2024    ANIONGAP 11  02/19/2024     Lab Results   Component Value Date    CREATININE 0.9 10/02/2024    CREATININE 1.0 02/24/2024    CREATININE 0.9 02/19/2024     Lab Results   Component Value Date    EGFRNORACEVR >60.0 10/02/2024    EGFRNORACEVR >60 02/24/2024    EGFRNORACEVR >60.0 02/19/2024     Assessment/Plan  -Pt qualifies for GLP-1 RA therapy based on BMI greater than or equal to 30 kg/m2  - Initiate Wegovy 0.25 mg once weekly for 1 month  - Then increase to Wegovy 0.5 mg once weekly for 1 month  - Then increase to Wegovy 1 mg once weekly  - RTC in 3 months for follow-up evaluation      Patient consented to pharmacist management via collaborative practice.

## 2025-01-07 ENCOUNTER — PATIENT MESSAGE (OUTPATIENT)
Dept: INTERNAL MEDICINE | Facility: CLINIC | Age: 60
End: 2025-01-07

## 2025-01-07 ENCOUNTER — OFFICE VISIT (OUTPATIENT)
Dept: INTERNAL MEDICINE | Facility: CLINIC | Age: 60
End: 2025-01-07
Payer: COMMERCIAL

## 2025-01-07 DIAGNOSIS — E66.812 OBESITY, CLASS II, BMI 35-39.9: Primary | ICD-10-CM

## 2025-01-07 RX ORDER — SEMAGLUTIDE 0.25 MG/.5ML
0.25 INJECTION, SOLUTION SUBCUTANEOUS
Qty: 2 ML | Refills: 0 | Status: SHIPPED | OUTPATIENT
Start: 2025-01-07

## 2025-01-07 RX ORDER — SEMAGLUTIDE 1 MG/.5ML
1 INJECTION, SOLUTION SUBCUTANEOUS
Qty: 2 ML | Refills: 1 | Status: SHIPPED | OUTPATIENT
Start: 2025-01-07

## 2025-01-07 RX ORDER — SEMAGLUTIDE 0.5 MG/.5ML
0.5 INJECTION, SOLUTION SUBCUTANEOUS
Qty: 2 ML | Refills: 0 | Status: SHIPPED | OUTPATIENT
Start: 2025-01-07

## 2025-01-30 ENCOUNTER — PATIENT MESSAGE (OUTPATIENT)
Dept: PRIMARY CARE CLINIC | Facility: CLINIC | Age: 60
End: 2025-01-30
Payer: COMMERCIAL

## 2025-02-20 ENCOUNTER — PATIENT MESSAGE (OUTPATIENT)
Dept: PRIMARY CARE CLINIC | Facility: CLINIC | Age: 60
End: 2025-02-20
Payer: COMMERCIAL

## 2025-02-20 ENCOUNTER — CLINICAL SUPPORT (OUTPATIENT)
Dept: OTHER | Facility: CLINIC | Age: 60
End: 2025-02-20

## 2025-02-20 DIAGNOSIS — Z00.8 ENCOUNTER FOR OTHER GENERAL EXAMINATION: ICD-10-CM

## 2025-02-23 ENCOUNTER — OFFICE VISIT (OUTPATIENT)
Dept: URGENT CARE | Facility: CLINIC | Age: 60
End: 2025-02-23
Payer: COMMERCIAL

## 2025-02-23 VITALS
BODY MASS INDEX: 34.84 KG/M2 | DIASTOLIC BLOOD PRESSURE: 80 MMHG | HEART RATE: 77 BPM | RESPIRATION RATE: 18 BRPM | TEMPERATURE: 98 F | OXYGEN SATURATION: 98 % | WEIGHT: 222 LBS | SYSTOLIC BLOOD PRESSURE: 144 MMHG | HEIGHT: 67 IN

## 2025-02-23 DIAGNOSIS — S69.92XA HAND INJURY, LEFT, INITIAL ENCOUNTER: ICD-10-CM
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
## 2025-02-23 DIAGNOSIS — S93.601A SPRAIN OF RIGHT FOOT, INITIAL ENCOUNTER: ICD-10-CM

## 2025-02-23 DIAGNOSIS — M79.642 LEFT HAND PAIN: ICD-10-CM

## 2025-02-23 DIAGNOSIS — M79.671 RIGHT FOOT PAIN: ICD-10-CM

## 2025-02-23 PROCEDURE — 73130 X-RAY EXAM OF HAND: CPT | Mod: LT,S$GLB,, | Performed by: RADIOLOGY

## 2025-02-23 PROCEDURE — 73630 X-RAY EXAM OF FOOT: CPT | Mod: RT,S$GLB,, | Performed by: RADIOLOGY
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
## 2025-02-23 NOTE — PATIENT INSTRUCTIONS
Limit activity not to aggravate symptoms.  Rest, ice, immobilize if desired.    Follow up with PCP if symptoms persist

## 2025-03-07 VITALS
BODY MASS INDEX: 34.53 KG/M2 | WEIGHT: 220 LBS | SYSTOLIC BLOOD PRESSURE: 123 MMHG | DIASTOLIC BLOOD PRESSURE: 66 MMHG | HEIGHT: 67 IN

## 2025-03-07 LAB
GLUCOSE SERPL-MCNC: 119 MG/DL (ref 60–140)
HDLC SERPL-MCNC: 97 MG/DL
POC CHOLESTEROL, LDL (DOCK): 53 MG/DL
POC CHOLESTEROL, TOTAL: 165 MG/DL
TRIGL SERPL-MCNC: 84 MG/DL

## 2025-05-05 ENCOUNTER — TELEPHONE (OUTPATIENT)
Dept: INTERNAL MEDICINE | Facility: CLINIC | Age: 60
End: 2025-05-05
Payer: COMMERCIAL

## 2025-05-05 NOTE — TELEPHONE ENCOUNTER
----- Message from Pharmacist Nina sent at 5/2/2025  3:52 PM CDT -----  Regarding: Wegovy  Patient never started Wegovy due to fear of side effects. Will close out at [facility] at this time.Thanks,Nina Deluna, Pharm DSupervisor- Lead Clinical PharmacistO[facility] Specialty Pharmacy

## 2025-05-05 NOTE — TELEPHONE ENCOUNTER
Patient has been closed out of [facility] MTM program due to never filling medication due to fear of ADRs. Programs updated, medications discontinued, and future visits canceled. Pt to welcome to re-enroll in  MTM program at later date if they would like.

## 2025-06-23 ENCOUNTER — PATIENT MESSAGE (OUTPATIENT)
Dept: PRIMARY CARE CLINIC | Facility: CLINIC | Age: 60
End: 2025-06-23
Payer: COMMERCIAL

## 2025-07-07 ENCOUNTER — OFFICE VISIT (OUTPATIENT)
Dept: PRIMARY CARE CLINIC | Facility: CLINIC | Age: 60
End: 2025-07-07
Payer: COMMERCIAL

## 2025-07-07 VITALS
HEART RATE: 80 BPM | HEIGHT: 67 IN | BODY MASS INDEX: 37.85 KG/M2 | WEIGHT: 241.19 LBS | SYSTOLIC BLOOD PRESSURE: 136 MMHG | OXYGEN SATURATION: 97 % | DIASTOLIC BLOOD PRESSURE: 82 MMHG

## 2025-07-07 DIAGNOSIS — Z12.31 ENCOUNTER FOR SCREENING MAMMOGRAM FOR MALIGNANT NEOPLASM OF BREAST: ICD-10-CM

## 2025-07-07 DIAGNOSIS — I10 ESSENTIAL HYPERTENSION: ICD-10-CM

## 2025-07-07 DIAGNOSIS — E66.812 CLASS 2 OBESITY DUE TO EXCESS CALORIES WITHOUT SERIOUS COMORBIDITY WITH BODY MASS INDEX (BMI) OF 37.0 TO 37.9 IN ADULT: ICD-10-CM

## 2025-07-07 DIAGNOSIS — E55.9 VITAMIN D DEFICIENCY: ICD-10-CM

## 2025-07-07 DIAGNOSIS — K21.9 GASTROESOPHAGEAL REFLUX DISEASE WITHOUT ESOPHAGITIS: ICD-10-CM

## 2025-07-07 DIAGNOSIS — E66.09 CLASS 2 OBESITY DUE TO EXCESS CALORIES WITHOUT SERIOUS COMORBIDITY WITH BODY MASS INDEX (BMI) OF 37.0 TO 37.9 IN ADULT: ICD-10-CM

## 2025-07-07 DIAGNOSIS — Z00.00 ANNUAL PHYSICAL EXAM: Primary | ICD-10-CM

## 2025-07-07 DIAGNOSIS — E66.01 SEVERE OBESITY (BMI 35.0-39.9) WITH COMORBIDITY: ICD-10-CM

## 2025-07-07 PROBLEM — K80.20 CALCULUS OF GALLBLADDER WITHOUT CHOLECYSTITIS WITHOUT OBSTRUCTION: Status: RESOLVED | Noted: 2024-09-27 | Resolved: 2025-07-07

## 2025-07-07 PROCEDURE — 99396 PREV VISIT EST AGE 40-64: CPT | Mod: S$GLB,,, | Performed by: INTERNAL MEDICINE

## 2025-07-07 PROCEDURE — 3079F DIAST BP 80-89 MM HG: CPT | Mod: CPTII,S$GLB,, | Performed by: INTERNAL MEDICINE

## 2025-07-07 PROCEDURE — 3008F BODY MASS INDEX DOCD: CPT | Mod: CPTII,S$GLB,, | Performed by: INTERNAL MEDICINE

## 2025-07-07 PROCEDURE — 99999 PR PBB SHADOW E&M-EST. PATIENT-LVL III: CPT | Mod: PBBFAC,,, | Performed by: INTERNAL MEDICINE

## 2025-07-07 PROCEDURE — 1159F MED LIST DOCD IN RCRD: CPT | Mod: CPTII,S$GLB,, | Performed by: INTERNAL MEDICINE

## 2025-07-07 PROCEDURE — 3075F SYST BP GE 130 - 139MM HG: CPT | Mod: CPTII,S$GLB,, | Performed by: INTERNAL MEDICINE

## 2025-07-07 RX ORDER — TOPIRAMATE 25 MG/1
25 TABLET, FILM COATED ORAL 2 TIMES DAILY
Qty: 60 TABLET | Refills: 11 | Status: SHIPPED | OUTPATIENT
Start: 2025-07-07 | End: 2026-07-07

## 2025-07-07 RX ORDER — OLMESARTAN MEDOXOMIL AND HYDROCHLOROTHIAZIDE 40/25 40; 25 MG/1; MG/1
1 TABLET ORAL DAILY
Qty: 90 TABLET | Refills: 2 | Status: SHIPPED | OUTPATIENT
Start: 2025-07-07

## 2025-07-07 NOTE — PATIENT INSTRUCTIONS
Try the following over the counter medications to help with your symptoms:  1. Antihistamine once daily (either Zyrtec, Allegra, Claritin or Xyzal). NOT D.  2. Flonase nasal spray once daily (fluticasone is generic)    3. Mucinex twice daily (Guaifenesin is generic)    Drink lots of water.

## 2025-07-07 NOTE — PROGRESS NOTES
[facility] Note    Chief Complaint      Chief Complaint   Patient presents with    Follow-up     History of Present Illness      Alejandrina Garrison is a 59 y.o. female who presents today for Annual preventative visit.   Patient comes to appointment alone.    Problem List Items Addressed This Visit       Class 2 obesity due to excess calories without serious comorbidity with body mass index (BMI) of 37.0 to 37.9 in adult    Current Assessment & Plan   Weight stable from last visit.         Relevant Medications    topiramate (TOPAMAX) 25 MG tablet    Other Relevant Orders    Hemoglobin A1C    Essential hypertension    Current Assessment & Plan   Stable on Olmesartan/HCTZ 40/25 mg daily. No CP/SOB.          Relevant Medications    olmesartan-hydrochlorothiazide (BENICAR HCT) 40-25 mg per tablet    Gastroesophageal reflux disease without esophagitis    Current Assessment & Plan   Stable on protonix, has not been taking daily but has been having heartburn depending on what she eats.  Still has bloating/belching.    EGD with Dr. James in 9/2023 with some inflammation.         Severe obesity (BMI 35.0-39.9) with comorbidity     Other Visit Diagnoses         Annual physical exam    -  Primary    Relevant Orders    CBC Auto Differential    Lipid Panel    Comprehensive Metabolic Panel      Encounter for screening mammogram for malignant neoplasm of breast        Relevant Orders    Mammo Digital Screening Bilat w/ Omar (XPD)      Vitamin D deficiency        Relevant Orders    Vitamin D                Health Maintenance   Topic Date Due    TETANUS VACCINE  Never done    Shingles Vaccine (1 of 2) Never done    Pneumococcal Vaccines (Age 50+) (1 of 1 - PCV) Never done    COVID-19 Vaccine (3 - 2024-25 season) 09/01/2024    Mammogram  10/01/2025    Influenza Vaccine (1) 09/01/2025    Hemoglobin A1c (Diabetic Prevention Screening)  02/19/2027    Cervical Cancer Screening  08/26/2029    Lipid Panel  02/20/2030     Colorectal Cancer Screening  2032    RSV Vaccine (Age 60+ and Pregnant patients) (1 - 1-dose 75+ series) 2040    Hepatitis C Screening  Completed    HIV Screening  Completed       Past Medical History:   Diagnosis Date    Abnormal uterine bleeding     Intermitting cycle    Amenorrhea     Essential (primary) hypertension     Hypertension        Past Surgical History:   Procedure Laterality Date     SECTION  1988    x3, , ,     CHOLECYSTECTOMY  Oct 2024    COLONOSCOPY N/A 2022    Procedure: COLONOSCOPY;  Surgeon: Tena Hall MD;  Location: [facility] ENDO;  Service: Endoscopy;  Laterality: N/A;    DILATION AND CURETTAGE OF UTERUS          ESOPHAGOGASTRODUODENOSCOPY N/A 2023    Procedure: EGD (ESOPHAGOGASTRODUODENOSCOPY);  Surgeon: Govind James MD;  Location: [facility] ENDOSCOPY;  Service: Endoscopy;  Laterality: N/A;   instruc sent via email  [facility]   Pre call attempted. Left VM    right tubal       ROBOT-ASSISTED CHOLECYSTECTOMY N/A 10/08/2024    Procedure: ROBOTIC CHOLECYSTECTOMY;  Surgeon: Joon Fuentes Jr., MD;  Location: [facility];  Service: General;  Laterality: N/A;    SALPINGOOPHORECTOMY      left    TUBAL LIGATION  2008       family history includes Arthritis in her paternal grandmother; Cancer in her father; Colon cancer in her maternal grandmother; Hypertension in her mother and sister; Leukemia in her father; Osteoporosis in her mother.    Social History     Tobacco Use    Smoking status: Never    Smokeless tobacco: Never   Substance Use Topics    Alcohol use: Not Currently     Alcohol/week: 1.0 standard drink of alcohol     Comment: Social drink, glass of wine if eating out    Drug use: Never       Review of Systems   Constitutional:  Negative for chills and fever.   HENT:  Negative for sore throat.    Respiratory:  Negative for cough and shortness of breath.    Cardiovascular:  Negative for chest pain and palpitations.  "  Gastrointestinal:  Negative for constipation, diarrhea, nausea and vomiting.   Genitourinary:  Negative for dysuria and hematuria.   Musculoskeletal:  Negative for falls.   Neurological:  Negative for headaches.        Outpatient Encounter Medications as of 7/7/2025   Medication Sig Dispense Refill    azelastine (ASTELIN) 137 mcg (0.1 %) nasal spray 1 spray (137 mcg total) by Nasal route 2 (two) times daily. 30 mL 0    fluticasone propionate (FLONASE) 50 mcg/actuation nasal spray 1 spray (50 mcg total) by Each Nostril route once daily. 16 g 0    pantoprazole (PROTONIX) 40 MG tablet Take 1 tablet (40 mg total) by mouth once daily. 90 tablet 3    [DISCONTINUED] olmesartan-hydrochlorothiazide (BENICAR HCT) 40-25 mg per tablet Take 1 tablet by mouth once daily. 90 tablet 2    olmesartan-hydrochlorothiazide (BENICAR HCT) 40-25 mg per tablet Take 1 tablet by mouth once daily. 90 tablet 2    topiramate (TOPAMAX) 25 MG tablet Take 1 tablet (25 mg total) by mouth 2 (two) times daily. 60 tablet 11    [DISCONTINUED] olmesartan (BENICAR) 40 MG tablet Take 1 tablet by mouth once daily 30 tablet 0     No facility-administered encounter medications on file as of 7/7/2025.        Review of patient's allergies indicates:   Allergen Reactions    Morphine Anaphylaxis, Hives, Itching and Shortness Of Breath    Opioids - morphine analogues Hives and Swelling     Throat swelling    Compazine [prochlorperazine]     Compazine [prochlorperazine] Nausea And Vomiting       Physical Exam      Vital Signs  Pulse: 80  SpO2: 97 %  BP: 136/82  Pain Score: 0-No pain  Height and Weight  Height: 5' 7" (170.2 cm)  Weight: 109.4 kg (241 lb 2.9 oz)  BSA (Calculated - sq m): 2.27 sq meters  BMI (Calculated): 37.8  Weight in (lb) to have BMI = 25: 159.3]    Physical Exam  Constitutional:       Appearance: She is well-developed.   HENT:      Head: Normocephalic and atraumatic.      Right Ear: External ear normal.      Left Ear: External ear normal. " "  Eyes:      General:         Right eye: No discharge.         Left eye: No discharge.   Cardiovascular:      Rate and Rhythm: Normal rate and regular rhythm.      Heart sounds: Normal heart sounds. No murmur heard.  Pulmonary:      Effort: Pulmonary effort is normal. No respiratory distress.      Breath sounds: Normal breath sounds.   Abdominal:      General: There is no distension.      Palpations: Abdomen is soft.      Tenderness: There is no abdominal tenderness. There is no guarding.   Musculoskeletal:         General: Normal range of motion.      Cervical back: Normal range of motion.   Skin:     General: Skin is warm and dry.   Neurological:      Mental Status: She is alert and oriented to person, place, and time.   Psychiatric:         Behavior: Behavior normal.          Laboratory:  CBC:  No results for input(s): "WBC", "RBC", "HGB", "HCT", "PLT", "MCV", "MCH", "MCHC" in the last 2160 hours.      CMP:  No results for input(s): "GLU", "CALCIUM", "ALBUMIN", "PROT", "NA", "K", "CO2", "CL", "BUN", "ALKPHOS", "ALT", "AST", "BILITOT" in the last 2160 hours.    Invalid input(s): "CREATININ"      URINALYSIS:  No results for input(s): "COLORU", "CLARITYU", "SPECGRAV", "PHUR", "PROTEINUA", "GLUCOSEU", "BILIRUBINCON", "BLOODU", "WBCU", "RBCU", "BACTERIA", "MUCUS", "NITRITE", "LEUKOCYTESUR", "UROBILINOGEN", "HYALINECASTS" in the last 2160 hours.   LIPIDS:  No results for input(s): "TSH", "HDL", "CHOL", "TRIG", "LDLCALC", "CHOLHDL", "NONHDLCHOL", "TOTALCHOLEST" in the last 2160 hours.    TSH:  No results for input(s): "TSH" in the last 2160 hours.    A1C:  No results for input(s): "HGBA1C" in the last 2160 hours.      Radiology:  No results found in the last 30 days.     Assessment/Plan     Alejandrina Garrison is a 59 y.o.female with:    1. Annual physical exam  - CBC Auto Differential; Future  - Lipid Panel; Future  - Comprehensive Metabolic Panel; Future    2. Essential hypertension  - olmesartan-hydrochlorothiazide " (BENICAR HCT) 40-25 mg per tablet; Take 1 tablet by mouth once daily.  Dispense: 90 tablet; Refill: 2    3. Gastroesophageal reflux disease without esophagitis    4. Encounter for screening mammogram for malignant neoplasm of breast  - Mammo Digital Screening Bilat w/ Omar (XPD); Future    5. Class 2 obesity due to excess calories without serious comorbidity with body mass index (BMI) of 37.0 to 37.9 in adult  - Hemoglobin A1C; Future  - topiramate (TOPAMAX) 25 MG tablet; Take 1 tablet (25 mg total) by mouth 2 (two) times daily.  Dispense: 60 tablet; Refill: 11    6. Severe obesity (BMI 35.0-39.9) with comorbidity    7. Vitamin D deficiency  - Vitamin D; Future        -Continue current medications and maintain follow up with specialists.    -Follow up in 1 year (on 7/7/2026) for Annual Visit.       Chapis Martinez MD  [facility]

## 2025-07-07 NOTE — ASSESSMENT & PLAN NOTE
Stable on protonix, has not been taking daily but has been having heartburn depending on what she eats.  Still has bloating/belching.    EGD with Dr. James in 9/2023 with some inflammation.

## 2025-07-19 ENCOUNTER — LAB VISIT (OUTPATIENT)
Dept: LAB | Facility: HOSPITAL | Age: 60
End: 2025-07-19
Attending: INTERNAL MEDICINE
Payer: COMMERCIAL

## 2025-07-19 DIAGNOSIS — Z00.00 ANNUAL PHYSICAL EXAM: ICD-10-CM

## 2025-07-19 DIAGNOSIS — E55.9 VITAMIN D DEFICIENCY: ICD-10-CM

## 2025-07-19 DIAGNOSIS — E66.812 CLASS 2 OBESITY DUE TO EXCESS CALORIES WITHOUT SERIOUS COMORBIDITY WITH BODY MASS INDEX (BMI) OF 37.0 TO 37.9 IN ADULT: ICD-10-CM

## 2025-07-19 DIAGNOSIS — E66.09 CLASS 2 OBESITY DUE TO EXCESS CALORIES WITHOUT SERIOUS COMORBIDITY WITH BODY MASS INDEX (BMI) OF 37.0 TO 37.9 IN ADULT: ICD-10-CM

## 2025-07-19 LAB
25(OH)D3+25(OH)D2 SERPL-MCNC: 59 NG/ML (ref 30–96)
ABSOLUTE EOSINOPHIL (SMH): 0.17 K/UL
ABSOLUTE MONOCYTE (SMH): 0.3 K/UL (ref 0.3–1)
ABSOLUTE NEUTROPHIL COUNT (SMH): 2.6 K/UL (ref 1.8–7.7)
ALBUMIN SERPL-MCNC: 3.8 G/DL (ref 3.5–5.2)
ALP SERPL-CCNC: 70 UNIT/L (ref 40–150)
ALT SERPL-CCNC: 13 UNIT/L (ref 10–44)
ANION GAP (SMH): 12 MMOL/L (ref 8–16)
AST SERPL-CCNC: 26 UNIT/L (ref 11–45)
BASOPHILS # BLD AUTO: 0.05 K/UL
BASOPHILS NFR BLD AUTO: 0.9 %
BILIRUB SERPL-MCNC: 0.4 MG/DL (ref 0.1–1)
BUN SERPL-MCNC: 14 MG/DL (ref 6–20)
CALCIUM SERPL-MCNC: 9.2 MG/DL (ref 8.7–10.5)
CHLORIDE SERPL-SCNC: 104 MMOL/L (ref 95–110)
CHOLEST SERPL-MCNC: 132 MG/DL (ref 120–199)
CHOLEST/HDLC SERPL: 1.7 {RATIO} (ref 2–5)
CO2 SERPL-SCNC: 27 MMOL/L (ref 23–29)
CREAT SERPL-MCNC: 0.9 MG/DL (ref 0.5–1.4)
EAG (SMH): 111 MG/DL (ref 68–131)
ERYTHROCYTE [DISTWIDTH] IN BLOOD BY AUTOMATED COUNT: 13.6 % (ref 11.5–14.5)
GFR SERPLBLD CREATININE-BSD FMLA CKD-EPI: >60 ML/MIN/1.73/M2
GLUCOSE SERPL-MCNC: 99 MG/DL (ref 70–110)
HBA1C MFR BLD: 5.5 % (ref 4–5.6)
HCT VFR BLD AUTO: 40.9 % (ref 37–48.5)
HDLC SERPL-MCNC: 78 MG/DL (ref 40–75)
HDLC SERPL: 59.1 % (ref 20–50)
HGB BLD-MCNC: 12.7 GM/DL (ref 12–16)
IMM GRANULOCYTES # BLD AUTO: 0.04 K/UL (ref 0–0.04)
IMM GRANULOCYTES NFR BLD AUTO: 0.7 % (ref 0–0.5)
LDLC SERPL CALC-MCNC: 43.4 MG/DL (ref 63–159)
LYMPHOCYTES # BLD AUTO: 2.22 K/UL (ref 1–4.8)
MCH RBC QN AUTO: 25.2 PG (ref 27–31)
MCHC RBC AUTO-ENTMCNC: 31.1 G/DL (ref 32–36)
MCV RBC AUTO: 81 FL (ref 82–98)
NONHDLC SERPL-MCNC: 54 MG/DL
NUCLEATED RBC (/100WBC) (SMH): 0 /100 WBC
PLATELET # BLD AUTO: 273 K/UL (ref 150–450)
PMV BLD AUTO: 9.7 FL (ref 9.2–12.9)
POTASSIUM SERPL-SCNC: 3.5 MMOL/L (ref 3.5–5.1)
PROT SERPL-MCNC: 7 GM/DL (ref 6–8.4)
RBC # BLD AUTO: 5.04 M/UL (ref 4–5.4)
RELATIVE EOSINOPHIL (SMH): 3.2 % (ref 0–8)
RELATIVE LYMPHOCYTE (SMH): 41.5 % (ref 18–48)
RELATIVE MONOCYTE (SMH): 5.6 % (ref 4–15)
RELATIVE NEUTROPHIL (SMH): 48.1 % (ref 38–73)
SODIUM SERPL-SCNC: 143 MMOL/L (ref 136–145)
TRIGL SERPL-MCNC: 53 MG/DL (ref 30–150)
WBC # BLD AUTO: 5.35 K/UL (ref 3.9–12.7)

## 2025-07-19 PROCEDURE — 82306 VITAMIN D 25 HYDROXY: CPT

## 2025-07-19 PROCEDURE — 80061 LIPID PANEL: CPT

## 2025-07-19 PROCEDURE — 36415 COLL VENOUS BLD VENIPUNCTURE: CPT

## 2025-07-19 PROCEDURE — 83036 HEMOGLOBIN GLYCOSYLATED A1C: CPT

## 2025-07-19 PROCEDURE — 80053 COMPREHEN METABOLIC PANEL: CPT

## 2025-07-19 PROCEDURE — 85025 COMPLETE CBC W/AUTO DIFF WBC: CPT

## 2025-07-28 ENCOUNTER — PATIENT MESSAGE (OUTPATIENT)
Dept: PRIMARY CARE CLINIC | Facility: CLINIC | Age: 60
End: 2025-07-28
Payer: COMMERCIAL

## 2025-07-28 ENCOUNTER — LAB VISIT (OUTPATIENT)
Dept: LAB | Facility: HOSPITAL | Age: 60
End: 2025-07-28
Attending: INTERNAL MEDICINE
Payer: COMMERCIAL

## 2025-07-28 DIAGNOSIS — Z00.00 ANNUAL PHYSICAL EXAM: ICD-10-CM

## 2025-07-28 DIAGNOSIS — E66.09 CLASS 2 OBESITY DUE TO EXCESS CALORIES WITHOUT SERIOUS COMORBIDITY WITH BODY MASS INDEX (BMI) OF 37.0 TO 37.9 IN ADULT: ICD-10-CM

## 2025-07-28 DIAGNOSIS — R71.8 MICROCYTOSIS: ICD-10-CM

## 2025-07-28 DIAGNOSIS — E66.812 CLASS 2 OBESITY DUE TO EXCESS CALORIES WITHOUT SERIOUS COMORBIDITY WITH BODY MASS INDEX (BMI) OF 37.0 TO 37.9 IN ADULT: ICD-10-CM

## 2025-07-28 LAB
ABSOLUTE EOSINOPHIL (SMH): 0.12 K/UL
ABSOLUTE MONOCYTE (SMH): 0.29 K/UL (ref 0.3–1)
ABSOLUTE NEUTROPHIL COUNT (SMH): 3.9 K/UL (ref 1.8–7.7)
ALBUMIN SERPL-MCNC: 4.1 G/DL (ref 3.5–5.2)
ALP SERPL-CCNC: 78 UNIT/L (ref 40–150)
ALT SERPL-CCNC: 12 UNIT/L (ref 10–44)
ANION GAP (SMH): 10 MMOL/L (ref 8–16)
AST SERPL-CCNC: 16 UNIT/L (ref 11–45)
BASOPHILS # BLD AUTO: 0.07 K/UL
BASOPHILS NFR BLD AUTO: 1.1 %
BILIRUB SERPL-MCNC: 0.2 MG/DL (ref 0.1–1)
BUN SERPL-MCNC: 14 MG/DL (ref 6–20)
CALCIUM SERPL-MCNC: 9.2 MG/DL (ref 8.7–10.5)
CHLORIDE SERPL-SCNC: 104 MMOL/L (ref 95–110)
CHOLEST SERPL-MCNC: 145 MG/DL (ref 120–199)
CHOLEST/HDLC SERPL: 1.7 {RATIO} (ref 2–5)
CO2 SERPL-SCNC: 28 MMOL/L (ref 23–29)
CREAT SERPL-MCNC: 0.8 MG/DL (ref 0.5–1.4)
EAG (SMH): 114 MG/DL (ref 68–131)
ERYTHROCYTE [DISTWIDTH] IN BLOOD BY AUTOMATED COUNT: 14 % (ref 11.5–14.5)
FERRITIN SERPL-MCNC: 32.2 NG/ML (ref 20–300)
GFR SERPLBLD CREATININE-BSD FMLA CKD-EPI: >60 ML/MIN/1.73/M2
GLUCOSE SERPL-MCNC: 79 MG/DL (ref 70–110)
HBA1C MFR BLD: 5.6 % (ref 4–5.6)
HCT VFR BLD AUTO: 40.1 % (ref 37–48.5)
HDLC SERPL-MCNC: 83 MG/DL (ref 40–75)
HDLC SERPL: 57.2 % (ref 20–50)
HGB BLD-MCNC: 12.7 GM/DL (ref 12–16)
IMM GRANULOCYTES # BLD AUTO: 0.02 K/UL (ref 0–0.04)
IMM GRANULOCYTES NFR BLD AUTO: 0.3 % (ref 0–0.5)
IRON SATN MFR SERPL: 12 % (ref 20–50)
IRON SERPL-MCNC: 47 UG/DL (ref 30–160)
LDLC SERPL CALC-MCNC: 48.6 MG/DL (ref 63–159)
LYMPHOCYTES # BLD AUTO: 2 K/UL (ref 1–4.8)
MCH RBC QN AUTO: 26 PG (ref 27–31)
MCHC RBC AUTO-ENTMCNC: 31.7 G/DL (ref 32–36)
MCV RBC AUTO: 82 FL (ref 82–98)
NONHDLC SERPL-MCNC: 62 MG/DL
NUCLEATED RBC (/100WBC) (SMH): 0 /100 WBC
PLATELET # BLD AUTO: 275 K/UL (ref 150–450)
PMV BLD AUTO: 10.1 FL (ref 9.2–12.9)
POTASSIUM SERPL-SCNC: 3.8 MMOL/L (ref 3.5–5.1)
PROT SERPL-MCNC: 7.4 GM/DL (ref 6–8.4)
RBC # BLD AUTO: 4.88 M/UL (ref 4–5.4)
RELATIVE EOSINOPHIL (SMH): 1.9 % (ref 0–8)
RELATIVE LYMPHOCYTE (SMH): 31.3 % (ref 18–48)
RELATIVE MONOCYTE (SMH): 4.5 % (ref 4–15)
RELATIVE NEUTROPHIL (SMH): 60.9 % (ref 38–73)
SODIUM SERPL-SCNC: 142 MMOL/L (ref 136–145)
TIBC SERPL-MCNC: 406 UG/DL (ref 250–450)
TRANSFERRIN SERPL-MCNC: 274 MG/DL (ref 200–375)
TRIGL SERPL-MCNC: 67 MG/DL (ref 30–150)
WBC # BLD AUTO: 6.4 K/UL (ref 3.9–12.7)

## 2025-07-28 PROCEDURE — 83036 HEMOGLOBIN GLYCOSYLATED A1C: CPT

## 2025-07-28 PROCEDURE — 83540 ASSAY OF IRON: CPT

## 2025-07-28 PROCEDURE — 83718 ASSAY OF LIPOPROTEIN: CPT

## 2025-07-28 PROCEDURE — 82435 ASSAY OF BLOOD CHLORIDE: CPT

## 2025-07-28 PROCEDURE — 36415 COLL VENOUS BLD VENIPUNCTURE: CPT

## 2025-07-28 PROCEDURE — 82728 ASSAY OF FERRITIN: CPT

## 2025-07-28 PROCEDURE — 85025 COMPLETE CBC W/AUTO DIFF WBC: CPT

## 2025-08-09 ENCOUNTER — PATIENT MESSAGE (OUTPATIENT)
Dept: ADMINISTRATIVE | Facility: OTHER | Age: 60
End: 2025-08-09
Payer: COMMERCIAL

## (undated) DEVICE — BAG TISSUE RETRIEVAL 5MM

## (undated) DEVICE — NDL SAFETY 21G X 1 1/2 ECLPSE

## (undated) DEVICE — STRAP OR TABLE 5IN X 72IN

## (undated) DEVICE — GLOVE SENSICARE PI GRN 6.5

## (undated) DEVICE — GOWN POLY REINF BRTH SLV XL

## (undated) DEVICE — PACK CUSTOM ENDO CHOLO SLI

## (undated) DEVICE — SEAL UNIVERSAL 5MM-8MM XI

## (undated) DEVICE — SET TUBE PNEUMOCLEAR SE HI FLO

## (undated) DEVICE — LINER SUCTION 3000CC

## (undated) DEVICE — GLOVE SENSICARE PI ALOE 7

## (undated) DEVICE — NDL INSUF ULTRA VERESS 120MM

## (undated) DEVICE — DRAPE COLUMN DAVINCI XI

## (undated) DEVICE — APPLICATOR CHLORAPREP ORN 26ML

## (undated) DEVICE — GLOVE SENSICARE PI GRN 7

## (undated) DEVICE — ADHESIVE DERMABOND ADVANCED

## (undated) DEVICE — GLOVE SENSICARE PI ALOE 6.5

## (undated) DEVICE — SUT MONOCRYL 4-0 PS-2

## (undated) DEVICE — ELECTRODE REM PLYHSV RETURN 9

## (undated) DEVICE — OBTURATOR BLADELESS 8MM XI CLR

## (undated) DEVICE — SLEEVE SCD EXPRESS KNEE MEDIUM

## (undated) DEVICE — DRAPE ARM DAVINCI XI

## (undated) DEVICE — SOL CLEARIFY VISUALIZATION LAP

## (undated) DEVICE — SOL ELECTROLUBE ANTI-STIC

## (undated) DEVICE — CLIP HEMO-LOK ML

## (undated) DEVICE — KIT PROCEDURE STER INLET CLOSU

## (undated) DEVICE — COVER TIP CURVED SCISSORS XI

## (undated) DEVICE — SOL IRRI STRL WATER 1000ML

## (undated) DEVICE — SUT VICRYL+ 27 UR-6 VIOL

## (undated) DEVICE — SYR 30CC LUER LOCK

## (undated) DEVICE — TOWEL OR DISP STRL BLUE 4/PK

## (undated) DEVICE — SOL NACL IRR 1000ML BTL

## (undated) DEVICE — GOWN POLY REINF X-LONG XL